# Patient Record
Sex: MALE | Race: WHITE | NOT HISPANIC OR LATINO | Employment: OTHER | URBAN - METROPOLITAN AREA
[De-identification: names, ages, dates, MRNs, and addresses within clinical notes are randomized per-mention and may not be internally consistent; named-entity substitution may affect disease eponyms.]

---

## 2023-05-27 LAB
ALANINE AMINOTRANSFERASE (SGPT) (U/L) IN SER/PLAS: 14 U/L (ref 10–52)
ALBUMIN (G/DL) IN SER/PLAS: 4.3 G/DL (ref 3.4–5)
ALKALINE PHOSPHATASE (U/L) IN SER/PLAS: 55 U/L (ref 33–136)
AMPHETAMINE (PRESENCE) IN URINE BY SCREEN METHOD: ABNORMAL
ASPARTATE AMINOTRANSFERASE (SGOT) (U/L) IN SER/PLAS: 17 U/L (ref 9–39)
BARBITURATES PRESENCE IN URINE BY SCREEN METHOD: ABNORMAL
BASOPHILS (10*3/UL) IN BLOOD BY AUTOMATED COUNT: 0.08 X10E9/L (ref 0–0.1)
BASOPHILS/100 LEUKOCYTES IN BLOOD BY AUTOMATED COUNT: 1.3 % (ref 0–2)
BENZODIAZEPINE (PRESENCE) IN URINE BY SCREEN METHOD: ABNORMAL
BILIRUBIN DIRECT (MG/DL) IN SER/PLAS: 0.1 MG/DL (ref 0–0.3)
BILIRUBIN TOTAL (MG/DL) IN SER/PLAS: 0.5 MG/DL (ref 0–1.2)
CANNABINOIDS IN URINE BY SCREEN METHOD: ABNORMAL
COCAINE (PRESENCE) IN URINE BY SCREEN METHOD: ABNORMAL
DRUG SCREEN COMMENT URINE: ABNORMAL
EOSINOPHILS (10*3/UL) IN BLOOD BY AUTOMATED COUNT: 0.43 X10E9/L (ref 0–0.4)
EOSINOPHILS/100 LEUKOCYTES IN BLOOD BY AUTOMATED COUNT: 6.8 % (ref 0–6)
ERYTHROCYTE DISTRIBUTION WIDTH (RATIO) BY AUTOMATED COUNT: 13.2 % (ref 11.5–14.5)
ERYTHROCYTE MEAN CORPUSCULAR HEMOGLOBIN CONCENTRATION (G/DL) BY AUTOMATED: 32.1 G/DL (ref 32–36)
ERYTHROCYTE MEAN CORPUSCULAR VOLUME (FL) BY AUTOMATED COUNT: 104 FL (ref 80–100)
ERYTHROCYTES (10*6/UL) IN BLOOD BY AUTOMATED COUNT: 3.94 X10E12/L (ref 4.5–5.9)
FENTANYL URINE: ABNORMAL
HEMATOCRIT (%) IN BLOOD BY AUTOMATED COUNT: 40.8 % (ref 41–52)
HEMOGLOBIN (G/DL) IN BLOOD: 13.1 G/DL (ref 13.5–17.5)
IMMATURE GRANULOCYTES/100 LEUKOCYTES IN BLOOD BY AUTOMATED COUNT: 0.3 % (ref 0–0.9)
LEUKOCYTES (10*3/UL) IN BLOOD BY AUTOMATED COUNT: 6.3 X10E9/L (ref 4.4–11.3)
LYMPHOCYTES (10*3/UL) IN BLOOD BY AUTOMATED COUNT: 1.75 X10E9/L (ref 0.8–3)
LYMPHOCYTES/100 LEUKOCYTES IN BLOOD BY AUTOMATED COUNT: 27.7 % (ref 13–44)
METHADONE (PRESENCE) IN URINE BY SCREEN METHOD: ABNORMAL
MONOCYTES (10*3/UL) IN BLOOD BY AUTOMATED COUNT: 0.52 X10E9/L (ref 0.05–0.8)
MONOCYTES/100 LEUKOCYTES IN BLOOD BY AUTOMATED COUNT: 8.2 % (ref 2–10)
NEUTROPHILS (10*3/UL) IN BLOOD BY AUTOMATED COUNT: 3.51 X10E9/L (ref 1.6–5.5)
NEUTROPHILS/100 LEUKOCYTES IN BLOOD BY AUTOMATED COUNT: 55.7 % (ref 40–80)
NRBC (PER 100 WBCS) BY AUTOMATED COUNT: 0 /100 WBC (ref 0–0)
OPIATES (PRESENCE) IN URINE BY SCREEN METHOD: ABNORMAL
OXYCODONE (PRESENCE) IN URINE BY SCREEN METHOD: ABNORMAL
PHENCYCLIDINE (PRESENCE) IN URINE BY SCREEN METHOD: ABNORMAL
PHENOBARBITAL (UG/ML) IN SER/PLAS: 16.6 UG/ML (ref 10–40)
PHENYTOIN (UG/ML) IN SER/PLAS: 6.4 UG/ML (ref 10–20)
PLATELETS (10*3/UL) IN BLOOD AUTOMATED COUNT: 283 X10E9/L (ref 150–450)
PROTEIN TOTAL: 7 G/DL (ref 6.4–8.2)

## 2023-06-01 LAB — OXCARB OR ESLICARB METABOLITE (MHD): 14 UG/ML (ref 3–35)

## 2023-06-03 LAB
BUTALBITAL, URN, QUANT: <50 NG/ML
PENTOBARBITAL, URN, QUANT: <50 NG/ML
PHENOBARBITAL, URN, QUANT: >5000 NG/ML

## 2023-08-08 LAB
ANION GAP IN SER/PLAS: 14 MMOL/L (ref 10–20)
CALCIUM (MG/DL) IN SER/PLAS: 9.8 MG/DL (ref 8.6–10.6)
CARBON DIOXIDE, TOTAL (MMOL/L) IN SER/PLAS: 28 MMOL/L (ref 21–32)
CHLORIDE (MMOL/L) IN SER/PLAS: 103 MMOL/L (ref 98–107)
CREATININE (MG/DL) IN SER/PLAS: 0.87 MG/DL (ref 0.5–1.3)
ERYTHROCYTE DISTRIBUTION WIDTH (RATIO) BY AUTOMATED COUNT: 13 % (ref 11.5–14.5)
ERYTHROCYTE MEAN CORPUSCULAR HEMOGLOBIN CONCENTRATION (G/DL) BY AUTOMATED: 31.4 G/DL (ref 32–36)
ERYTHROCYTE MEAN CORPUSCULAR VOLUME (FL) BY AUTOMATED COUNT: 104 FL (ref 80–100)
ERYTHROCYTES (10*6/UL) IN BLOOD BY AUTOMATED COUNT: 4.03 X10E12/L (ref 4.5–5.9)
GFR MALE: 90 ML/MIN/1.73M2
GLUCOSE (MG/DL) IN SER/PLAS: 88 MG/DL (ref 74–99)
HEMATOCRIT (%) IN BLOOD BY AUTOMATED COUNT: 42 % (ref 41–52)
HEMOGLOBIN (G/DL) IN BLOOD: 13.2 G/DL (ref 13.5–17.5)
LEUKOCYTES (10*3/UL) IN BLOOD BY AUTOMATED COUNT: 6.5 X10E9/L (ref 4.4–11.3)
NRBC (PER 100 WBCS) BY AUTOMATED COUNT: 0 /100 WBC (ref 0–0)
PLATELETS (10*3/UL) IN BLOOD AUTOMATED COUNT: 288 X10E9/L (ref 150–450)
POTASSIUM (MMOL/L) IN SER/PLAS: 5.5 MMOL/L (ref 3.5–5.3)
SODIUM (MMOL/L) IN SER/PLAS: 139 MMOL/L (ref 136–145)
UREA NITROGEN (MG/DL) IN SER/PLAS: 19 MG/DL (ref 6–23)

## 2023-10-30 DIAGNOSIS — R51.9 FACIAL PAIN: Primary | ICD-10-CM

## 2023-10-30 RX ORDER — OXCARBAZEPINE 300 MG/1
300 TABLET, FILM COATED ORAL 2 TIMES DAILY
Qty: 30 TABLET | Refills: 2 | Status: SHIPPED | OUTPATIENT
Start: 2023-10-30 | End: 2023-11-20 | Stop reason: WASHOUT

## 2023-11-17 DIAGNOSIS — R51.9 FACIAL PAIN: Primary | ICD-10-CM

## 2023-11-18 DIAGNOSIS — G40.409 OTHER GENERALIZED EPILEPSY AND EPILEPTIC SYNDROMES, NOT INTRACTABLE, WITHOUT STATUS EPILEPTICUS (MULTI): Primary | ICD-10-CM

## 2023-11-20 RX ORDER — OXCARBAZEPINE 300 MG/1
300 TABLET, FILM COATED ORAL 2 TIMES DAILY
Qty: 60 TABLET | Refills: 2 | Status: SHIPPED | OUTPATIENT
Start: 2023-11-20 | End: 2023-12-13 | Stop reason: SDUPTHER

## 2023-11-20 RX ORDER — OXCARBAZEPINE 600 MG/1
600 TABLET, FILM COATED ORAL 2 TIMES DAILY
Qty: 180 TABLET | Refills: 0 | OUTPATIENT
Start: 2023-11-20

## 2023-11-20 RX ORDER — PHENOBARBITAL 64.8 MG/1
64.8 TABLET ORAL 2 TIMES DAILY
Qty: 180 TABLET | Refills: 0 | Status: SHIPPED | OUTPATIENT
Start: 2023-11-20

## 2023-11-27 ENCOUNTER — OFFICE VISIT (OUTPATIENT)
Dept: NEUROLOGY | Facility: CLINIC | Age: 73
End: 2023-11-27
Payer: MEDICARE

## 2023-11-27 DIAGNOSIS — R51.9 FACIAL PAIN: Primary | ICD-10-CM

## 2023-11-27 DIAGNOSIS — Z87.898 HISTORY OF SEIZURE: ICD-10-CM

## 2023-11-27 PROCEDURE — 1160F RVW MEDS BY RX/DR IN RCRD: CPT | Performed by: PSYCHIATRY & NEUROLOGY

## 2023-11-27 PROCEDURE — 99213 OFFICE O/P EST LOW 20 MIN: CPT | Performed by: PSYCHIATRY & NEUROLOGY

## 2023-11-27 PROCEDURE — 1159F MED LIST DOCD IN RCRD: CPT | Performed by: PSYCHIATRY & NEUROLOGY

## 2023-11-27 RX ORDER — NAPROXEN SODIUM 220 MG/1
81 TABLET, FILM COATED ORAL DAILY
COMMUNITY
Start: 2022-06-21

## 2023-11-27 RX ORDER — GABAPENTIN 300 MG/1
300 CAPSULE ORAL 2 TIMES DAILY
COMMUNITY
Start: 2021-04-12

## 2023-11-27 RX ORDER — FUROSEMIDE 40 MG/1
40 TABLET ORAL
COMMUNITY
Start: 2017-09-18

## 2023-11-27 RX ORDER — LISINOPRIL 2.5 MG/1
2.5 TABLET ORAL DAILY
COMMUNITY
Start: 2023-05-08

## 2023-11-27 RX ORDER — PHENYTOIN SODIUM 200 MG/1
200 CAPSULE, EXTENDED RELEASE ORAL 2 TIMES DAILY
COMMUNITY
End: 2024-02-20 | Stop reason: SDUPTHER

## 2023-11-27 RX ORDER — ATORVASTATIN CALCIUM 80 MG/1
80 TABLET, FILM COATED ORAL DAILY
COMMUNITY

## 2023-11-27 RX ORDER — SOTALOL HYDROCHLORIDE 80 MG/1
40 TABLET ORAL
COMMUNITY
Start: 2022-01-11 | End: 2024-10-27

## 2023-11-27 NOTE — PROGRESS NOTES
Chief Complaint: Facial Pain    HPI  73 y.o. male presenting for follow up regarding facial pain    Since the last visit, he dropped his Trileptal dose to 150 mg BID.  He noted pain in the right V1 region.  He increased his Trileptal dose on his own to 450 mg BID.  At this dose, he is pain free.  He notes an improvement in the itching.  He did not see Dermatology.    He denies any seizures.  He is on Phenobarbital 64.8 mg BID and Dilantin 200 mg BID.  His last seizure was 33 years ago.          Current Outpatient Medications:     OXcarbazepine (Trileptal) 300 mg tablet, Take 1 tablet (300 mg) by mouth 2 times a day., Disp: 60 tablet, Rfl: 2    PHENobarbitaL 64.8 mg tablet, TAKE ONE TABLET BY MOUTH TWO TIMES A DAY, Disp: 180 tablet, Rfl: 0    phenytoin ER (Dilantin) 200 mg capsule, Take 1 capsule (200 mg) by mouth 2 times a day., Disp: , Rfl:      Objective:  Gen: NAD  Neuro:  --HIF: A&O X 3, repetition and naming intact  --CN:  PERRLA, EOMI, VFF, no visible facial asymmetry, facial sensation intact, no tongue or palatal deviation, SCM intact  --Motor: Moves all 4 extremities equally; no focal deficits  --Sensory: Intact to light touch, intact to pinprick  --Reflex: trace symmetric, toes down  --Cerebellum: FTN and HTS intact  --Gait: Antalgic gait    Relevant Results      Assessment:    Facial Pain  - likely TN  - he tried to reduce his Trileptal dose but noted recurrence of facial pain  - recommend continuing Trileptal 450 mg BID    2.  History of Seizures - seizure free for over 30 years  - continue Dilantin and Phenobarbital    Follow up in 4 months,      Eddie Morin MD  [facility]  Department of Neurology

## 2023-12-13 ENCOUNTER — TELEPHONE (OUTPATIENT)
Dept: NEUROLOGY | Facility: CLINIC | Age: 73
End: 2023-12-13
Payer: MEDICARE

## 2023-12-13 DIAGNOSIS — R51.9 FACIAL PAIN: ICD-10-CM

## 2023-12-13 RX ORDER — OXCARBAZEPINE 600 MG/1
600 TABLET, FILM COATED ORAL 2 TIMES DAILY
Qty: 60 TABLET | Refills: 5 | Status: SHIPPED | OUTPATIENT
Start: 2023-12-13

## 2023-12-13 NOTE — TELEPHONE ENCOUNTER
Jose Luis called to tell you that the 300mg of the oxcarbazepine is not working. He wanted to know if he could switch back to the original prescription. 600mg 1 tablet twice daily. He also wanted to know if you could send it in with as many pills and refills as possible.    I also confirmed the [facility] in [place] is his preferred pharmacy.    Please advise,  Thanks!

## 2024-01-30 RX ORDER — GABAPENTIN 300 MG/1
300 CAPSULE ORAL 2 TIMES DAILY
Status: CANCELLED | OUTPATIENT
Start: 2024-01-30

## 2024-02-20 DIAGNOSIS — G40.409 OTHER GENERALIZED EPILEPSY AND EPILEPTIC SYNDROMES, NOT INTRACTABLE, WITHOUT STATUS EPILEPTICUS (MULTI): ICD-10-CM

## 2024-02-20 RX ORDER — PHENYTOIN SODIUM 200 MG/1
200 CAPSULE, EXTENDED RELEASE ORAL 2 TIMES DAILY
Qty: 60 CAPSULE | Refills: 5 | Status: SHIPPED | OUTPATIENT
Start: 2024-02-20 | End: 2024-05-24 | Stop reason: SDUPTHER

## 2024-03-27 ENCOUNTER — APPOINTMENT (OUTPATIENT)
Dept: NEUROLOGY | Facility: CLINIC | Age: 74
End: 2024-03-27
Payer: MEDICARE

## 2024-05-24 ENCOUNTER — TELEPHONE (OUTPATIENT)
Dept: NEUROLOGY | Facility: CLINIC | Age: 74
End: 2024-05-24
Payer: MEDICARE

## 2024-05-24 DIAGNOSIS — G40.409 OTHER GENERALIZED EPILEPSY AND EPILEPTIC SYNDROMES, NOT INTRACTABLE, WITHOUT STATUS EPILEPTICUS (MULTI): ICD-10-CM

## 2024-05-24 RX ORDER — PHENYTOIN SODIUM 100 MG/1
200 CAPSULE, EXTENDED RELEASE ORAL 2 TIMES DAILY
Qty: 120 CAPSULE | Refills: 2 | Status: SHIPPED | OUTPATIENT
Start: 2024-05-24

## 2024-05-24 RX ORDER — PHENYTOIN SODIUM 200 MG/1
200 CAPSULE, EXTENDED RELEASE ORAL 2 TIMES DAILY
Qty: 60 CAPSULE | Refills: 5 | Status: SHIPPED | OUTPATIENT
Start: 2024-05-24 | End: 2024-05-24 | Stop reason: SDUPTHER

## 2024-05-24 NOTE — TELEPHONE ENCOUNTER
Pts wife Darian called requesting a new prescription for Phenytoin ER 200mg sent to [facility] on [address]. Please review, thanks.

## 2024-05-24 NOTE — TELEPHONE ENCOUNTER
Jose Luis called and requested us to change his medication due to the pharmacy not having his specific medication on hand. He is requesting Phenytoin 100mg capsules, BID. Please review, thanks.

## 2024-07-15 ENCOUNTER — TELEPHONE (OUTPATIENT)
Dept: NEUROLOGY | Facility: CLINIC | Age: 74
End: 2024-07-15
Payer: COMMERCIAL

## 2024-07-15 DIAGNOSIS — G40.409 OTHER GENERALIZED EPILEPSY AND EPILEPTIC SYNDROMES, NOT INTRACTABLE, WITHOUT STATUS EPILEPTICUS (MULTI): ICD-10-CM

## 2024-07-15 RX ORDER — PHENOBARBITAL 64.8 MG/1
64.8 TABLET ORAL 2 TIMES DAILY
Qty: 180 TABLET | Refills: 0 | Status: SHIPPED | OUTPATIENT
Start: 2024-07-15

## 2024-07-15 NOTE — TELEPHONE ENCOUNTER
Pt needs a refill on the phenobarbital 64.8mg sent to the [facility] [place] on [address],  also he would like his MRI read that had been done when he had his surgery done in March

## 2024-07-25 ENCOUNTER — LAB (OUTPATIENT)
Dept: LAB | Facility: LAB | Age: 74
End: 2024-07-25
Payer: COMMERCIAL

## 2024-07-25 ENCOUNTER — APPOINTMENT (OUTPATIENT)
Dept: NEUROLOGY | Facility: CLINIC | Age: 74
End: 2024-07-25
Payer: COMMERCIAL

## 2024-07-25 VITALS
BODY MASS INDEX: 41.09 KG/M2 | WEIGHT: 303.4 LBS | DIASTOLIC BLOOD PRESSURE: 86 MMHG | SYSTOLIC BLOOD PRESSURE: 144 MMHG | TEMPERATURE: 96.9 F | HEIGHT: 72 IN | HEART RATE: 73 BPM

## 2024-07-25 DIAGNOSIS — R26.9 GAIT DISORDER: ICD-10-CM

## 2024-07-25 DIAGNOSIS — R63.4 ABNORMAL WEIGHT LOSS: ICD-10-CM

## 2024-07-25 DIAGNOSIS — R41.89 COGNITIVE DECLINE: Primary | ICD-10-CM

## 2024-07-25 DIAGNOSIS — R41.89 COGNITIVE DECLINE: ICD-10-CM

## 2024-07-25 DIAGNOSIS — G40.909 SEIZURE DISORDER (MULTI): ICD-10-CM

## 2024-07-25 DIAGNOSIS — R51.9 FACIAL PAIN: ICD-10-CM

## 2024-07-25 LAB
ALBUMIN SERPL BCP-MCNC: 4.4 G/DL (ref 3.4–5)
ALP SERPL-CCNC: 62 U/L (ref 33–136)
ALT SERPL W P-5'-P-CCNC: 11 U/L (ref 10–52)
ANION GAP SERPL CALC-SCNC: 15 MMOL/L (ref 10–20)
AST SERPL W P-5'-P-CCNC: 18 U/L (ref 9–39)
BASOPHILS # BLD AUTO: 0.08 X10*3/UL (ref 0–0.1)
BASOPHILS NFR BLD AUTO: 1 %
BILIRUB SERPL-MCNC: 0.6 MG/DL (ref 0–1.2)
BUN SERPL-MCNC: 19 MG/DL (ref 6–23)
CALCIUM SERPL-MCNC: 9.9 MG/DL (ref 8.6–10.3)
CHLORIDE SERPL-SCNC: 102 MMOL/L (ref 98–107)
CO2 SERPL-SCNC: 26 MMOL/L (ref 21–32)
CREAT SERPL-MCNC: 0.93 MG/DL (ref 0.5–1.3)
EGFRCR SERPLBLD CKD-EPI 2021: 86 ML/MIN/1.73M*2
EOSINOPHIL # BLD AUTO: 0.4 X10*3/UL (ref 0–0.4)
EOSINOPHIL NFR BLD AUTO: 5.2 %
ERYTHROCYTE [DISTWIDTH] IN BLOOD BY AUTOMATED COUNT: 14.8 % (ref 11.5–14.5)
GLUCOSE SERPL-MCNC: 83 MG/DL (ref 74–99)
HCT VFR BLD AUTO: 40 % (ref 41–52)
HGB BLD-MCNC: 12.9 G/DL (ref 13.5–17.5)
IMM GRANULOCYTES # BLD AUTO: 0.02 X10*3/UL (ref 0–0.5)
IMM GRANULOCYTES NFR BLD AUTO: 0.3 % (ref 0–0.9)
LYMPHOCYTES # BLD AUTO: 1.34 X10*3/UL (ref 0.8–3)
LYMPHOCYTES NFR BLD AUTO: 17.6 %
MCH RBC QN AUTO: 33.2 PG (ref 26–34)
MCHC RBC AUTO-ENTMCNC: 32.3 G/DL (ref 32–36)
MCV RBC AUTO: 103 FL (ref 80–100)
MONOCYTES # BLD AUTO: 0.67 X10*3/UL (ref 0.05–0.8)
MONOCYTES NFR BLD AUTO: 8.8 %
NEUTROPHILS # BLD AUTO: 5.11 X10*3/UL (ref 1.6–5.5)
NEUTROPHILS NFR BLD AUTO: 67.1 %
NRBC BLD-RTO: 0 /100 WBCS (ref 0–0)
PHENOBARB SERPL-MCNC: 22.7 UG/ML (ref 10–40)
PHENYTOIN SERPL-MCNC: 14 UG/ML (ref 10–20)
PLATELET # BLD AUTO: 291 X10*3/UL (ref 150–450)
POTASSIUM SERPL-SCNC: 4.6 MMOL/L (ref 3.5–5.3)
PROT SERPL-MCNC: 7.3 G/DL (ref 6.4–8.2)
RBC # BLD AUTO: 3.88 X10*6/UL (ref 4.5–5.9)
SODIUM SERPL-SCNC: 138 MMOL/L (ref 136–145)
TSH SERPL-ACNC: 1.26 MIU/L (ref 0.44–3.98)
VIT B12 SERPL-MCNC: 226 PG/ML (ref 211–911)
WBC # BLD AUTO: 7.6 X10*3/UL (ref 4.4–11.3)

## 2024-07-25 PROCEDURE — 85025 COMPLETE CBC W/AUTO DIFF WBC: CPT

## 2024-07-25 PROCEDURE — 1036F TOBACCO NON-USER: CPT | Performed by: PSYCHIATRY & NEUROLOGY

## 2024-07-25 PROCEDURE — 99215 OFFICE O/P EST HI 40 MIN: CPT | Performed by: PSYCHIATRY & NEUROLOGY

## 2024-07-25 PROCEDURE — 80053 COMPREHEN METABOLIC PANEL: CPT

## 2024-07-25 PROCEDURE — 1159F MED LIST DOCD IN RCRD: CPT | Performed by: PSYCHIATRY & NEUROLOGY

## 2024-07-25 PROCEDURE — 84443 ASSAY THYROID STIM HORMONE: CPT

## 2024-07-25 PROCEDURE — 80185 ASSAY OF PHENYTOIN TOTAL: CPT

## 2024-07-25 PROCEDURE — 80186 ASSAY OF PHENYTOIN FREE: CPT

## 2024-07-25 PROCEDURE — 36415 COLL VENOUS BLD VENIPUNCTURE: CPT

## 2024-07-25 PROCEDURE — 80183 DRUG SCRN QUANT OXCARBAZEPIN: CPT

## 2024-07-25 PROCEDURE — 80184 ASSAY OF PHENOBARBITAL: CPT

## 2024-07-25 PROCEDURE — 82607 VITAMIN B-12: CPT

## 2024-07-25 ASSESSMENT — ENCOUNTER SYMPTOMS
OCCASIONAL FEELINGS OF UNSTEADINESS: 1
LOSS OF SENSATION IN FEET: 0
DEPRESSION: 0

## 2024-07-25 ASSESSMENT — LIFESTYLE VARIABLES
SKIP TO QUESTIONS 9-10: 1
HOW MANY STANDARD DRINKS CONTAINING ALCOHOL DO YOU HAVE ON A TYPICAL DAY: PATIENT DOES NOT DRINK
HOW OFTEN DO YOU HAVE A DRINK CONTAINING ALCOHOL: NEVER
HOW OFTEN DO YOU HAVE SIX OR MORE DRINKS ON ONE OCCASION: NEVER
AUDIT-C TOTAL SCORE: 0

## 2024-07-25 ASSESSMENT — PATIENT HEALTH QUESTIONNAIRE - PHQ9
SUM OF ALL RESPONSES TO PHQ9 QUESTIONS 1 & 2: 0
1. LITTLE INTEREST OR PLEASURE IN DOING THINGS: NOT AT ALL
2. FEELING DOWN, DEPRESSED OR HOPELESS: NOT AT ALL

## 2024-07-25 NOTE — PROGRESS NOTES
Chief Complaint: Cognitive Difficulty, Facial Pain, Balance difficulty    HPI  74 y.o. male presenting for follow up regarding above.    Since the last visit, the patient has developed new symptoms.    He has developed short-term memory difficulty ever since March 2024.  He frequently forgets recent conversations and recent events.  He does repeat himself.  His family has noticed that he becomes confused easily.  He forgets how to turn off the television.  He gets his medications confused all the time.  He confuses doctors.  The other day, he turned on the heat and set the thermastat to 89 degrees (even though it was the middle of the summer).  He is forgetting to pay bills.  He is more aphatetic now and not interested in things.  He sleeps all the time.      His balance is off as well.  He has fallen and uses a walker.  His family feels like he shuffles his feet.  He denies any lower extremity weakness/numbness.  He also denies any low back pain or neck pain.  He denies any bladder incontinence.  He does occasionally have tremors in his hands.  The tremors are mostly in his left hand.  It occurs both at rest and when using his hands.  He is much slower.  It is difficult to get up from a chair, button a shirt.  He denies any difficulty with swallowing or drooling.  He denies any micrographia.  His family has noticed that his voice is getting softer.     He has noted worsening facial pain.  He describes a throbbing pain in the right V1 region.  He denies any electrical shock like sensation.  The pain does not worsen with chewing, speaking, or brushing his teeth.  He later on tells me that talking can cause an electrical shock in this region.  He is currently on Oxcarbazepine 600 mg BID.      He has had a 30 lb weight loss since April.      Of note, he had a hip replacement in March.  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      The patient denies any specific seizures since the last visit.   Specifically, there was no evidence of loss of consciousness, convulsions, myoclonus, staring spells, mariia vu, jammais vu, olfactory aura, gustatory aura, rising epigastric aura, or aphasia.    Current AEDs:   Oxcarbazepine 600 mg BID  Phenobarbital 64.8 mg BID  Phenytoin  mg BID      Current Outpatient Medications:     aspirin 81 mg chewable tablet, Chew 1 tablet (81 mg) once daily., Disp: , Rfl:     atorvastatin (Lipitor) 80 mg tablet, Take 1 tablet (80 mg) by mouth once daily., Disp: , Rfl:     furosemide (Lasix) 40 mg tablet, Take 1 tablet (40 mg) by mouth once daily., Disp: , Rfl:     gabapentin (Neurontin) 300 mg capsule, Take 1 capsule (300 mg) by mouth 2 times a day., Disp: , Rfl:     lisinopril 2.5 mg tablet, Take 1 tablet (2.5 mg) by mouth once daily., Disp: , Rfl:     OXcarbazepine (Trileptal) 600 mg tablet, Take 1 tablet (600 mg) by mouth 2 times a day., Disp: 60 tablet, Rfl: 5    PHENobarbitaL 64.8 mg tablet, Take 1 tablet (64.8 mg) by mouth 2 times a day., Disp: 180 tablet, Rfl: 0    phenytoin ER (Dilantin) 100 mg capsule, Take 2 capsules (200 mg) by mouth 2 times a day., Disp: 120 capsule, Rfl: 2    rivaroxaban (Xarelto) 20 mg tablet, Take 1 tablet (20 mg) by mouth once daily in the evening. Take with meals., Disp: , Rfl:     sotalol (Betapace) 80 mg tablet, Take 0.5 tablets (40 mg) by mouth., Disp: , Rfl:       Objective:  /86 (BP Location: Left arm, Patient Position: Sitting, BP Cuff Size: Adult)   Pulse 73   Temp 36.1 °C (96.9 °F) (Temporal)   Ht 1.829 m (6')   Wt 138 kg (303 lb 6.4 oz)   BMI 41.15 kg/m²     Gen: NAD  Neuro:  --HIF:   Mini-Mental Status Exam:  Orientation to Time (Year, Season, Month, Date, Day of Week): 2  Orientation to Place (State, County, City, Name of Place, Floor):  5  Registration (Apple, Table, Tayler): 3  Attention (Spell 'World' backwards): 5  Delayed Verbal Recall: 3  Naming (Watch, Pen): 2  Repetition (No Ifs, Ands, or Buts): 1  Follows command with  crossover: 3  Read a sentence: 1  Write a sentence: 1  Copy a figure: 1  Total Score: 27    --CN:  PERRLA, EOMI, VFF, no visible facial asymmetry, facial sensation intact, no tongue or palatal deviation, SCM intact  --Motor: Moves all 4 extremities equally; no focal deficits; no significant bradykinesia, no tremors noted, no CWR  --Sensory: Intact to light touch, intact to pinprick  --Reflex: 2+ in UE, absent in LE; murillo's negative, toes down  --Cerebellum: FTN and HTS intact  --Gait: Shuffling Gait, uses walker, 5 steps with turns, stooped posture    Relevant Results  CT Head: no acute changes; ventricles and sulcal spaces are mildly enlarged    Assessment:    Cognitive Changes  - the patient has developed confusion and memory difficulty since March 2024 (following a hip surgery); he has had significant weight loss since the last visit  - on exam, her MMSE was 27/30  - ddx includes adverse effects of medication (or toxicity of medication), stroke, NPH, etc  - recommend MRI Brain with and without contrast  - labs: CBC, CMP, Vitamin B12, TSH, AED levels    2.  Gait Disorder  - ddx includes NPH, parkinsonism (family has noted tremors; not seen during exam)  - recommend MRI brain with and without contrast  - consider SHARI Scan    3.  Facial Pain  - possible trigeminal neuralgia  - will continue Oxcarbazepine 600 mg BID for now  - check level    4.  Seizure Disorder  - patient has a history of seizures; last seizure was decades ago  - patient is declining to wean off of any of his AED  - check AED levels    Eddie Morin MD  [facility]  Department of Neurology

## 2024-07-27 LAB — 10OH-CARBAZEPINE SERPL-MCNC: 19 UG/ML (ref 3–35)

## 2024-07-30 LAB
PHENYTOIN FREE SERPL-MCNC: 1.4 UG/ML (ref 1–2)
SCAN RESULT: NORMAL

## 2024-08-06 ENCOUNTER — TELEPHONE (OUTPATIENT)
Dept: NEUROLOGY | Facility: CLINIC | Age: 74
End: 2024-08-06
Payer: COMMERCIAL

## 2024-08-06 DIAGNOSIS — R51.9 FACIAL PAIN: ICD-10-CM

## 2024-08-06 RX ORDER — OXCARBAZEPINE 600 MG/1
600 TABLET, FILM COATED ORAL 2 TIMES DAILY
Qty: 60 TABLET | Refills: 5 | Status: SHIPPED | OUTPATIENT
Start: 2024-08-06

## 2024-08-07 DIAGNOSIS — R41.89 COGNITIVE CHANGES: Primary | ICD-10-CM

## 2024-08-12 ENCOUNTER — HOSPITAL ENCOUNTER (OUTPATIENT)
Dept: RADIOLOGY | Facility: HOSPITAL | Age: 74
Discharge: HOME | End: 2024-08-12
Payer: COMMERCIAL

## 2024-08-12 DIAGNOSIS — R41.89 COGNITIVE CHANGES: ICD-10-CM

## 2024-08-12 PROCEDURE — 71046 X-RAY EXAM CHEST 2 VIEWS: CPT | Performed by: RADIOLOGY

## 2024-08-12 PROCEDURE — 71046 X-RAY EXAM CHEST 2 VIEWS: CPT

## 2024-08-30 ENCOUNTER — HOSPITAL ENCOUNTER (OUTPATIENT)
Dept: CARDIOLOGY | Facility: HOSPITAL | Age: 74
Discharge: HOME | End: 2024-08-30
Payer: COMMERCIAL

## 2024-08-30 ENCOUNTER — HOSPITAL ENCOUNTER (OUTPATIENT)
Dept: RADIOLOGY | Facility: HOSPITAL | Age: 74
Discharge: HOME | End: 2024-08-30
Payer: COMMERCIAL

## 2024-08-30 VITALS — HEART RATE: 61 BPM | DIASTOLIC BLOOD PRESSURE: 89 MMHG | OXYGEN SATURATION: 100 % | SYSTOLIC BLOOD PRESSURE: 120 MMHG

## 2024-08-30 DIAGNOSIS — G40.909 EPILEPSY, UNSPECIFIED, NOT INTRACTABLE, WITHOUT STATUS EPILEPTICUS (MULTI): ICD-10-CM

## 2024-08-30 DIAGNOSIS — R51.9 FACIAL PAIN: ICD-10-CM

## 2024-08-30 DIAGNOSIS — R41.89 OTHER SYMPTOMS AND SIGNS INVOLVING COGNITIVE FUNCTIONS AND AWARENESS: ICD-10-CM

## 2024-08-30 DIAGNOSIS — R51.9 HEADACHE, UNSPECIFIED: ICD-10-CM

## 2024-08-30 DIAGNOSIS — R41.89 COGNITIVE DECLINE: ICD-10-CM

## 2024-08-30 DIAGNOSIS — R26.9 UNSPECIFIED ABNORMALITIES OF GAIT AND MOBILITY: ICD-10-CM

## 2024-08-30 DIAGNOSIS — R26.9 GAIT DISORDER: ICD-10-CM

## 2024-08-30 DIAGNOSIS — G40.909 SEIZURE DISORDER (MULTI): ICD-10-CM

## 2024-08-30 PROCEDURE — A9575 INJ GADOTERATE MEGLUMI 0.1ML: HCPCS | Performed by: PSYCHIATRY & NEUROLOGY

## 2024-08-30 PROCEDURE — 93286 PERI-PX EVAL PM/LDLS PM IP: CPT

## 2024-08-30 PROCEDURE — 2550000001 HC RX 255 CONTRASTS: Performed by: PSYCHIATRY & NEUROLOGY

## 2024-08-30 PROCEDURE — 70553 MRI BRAIN STEM W/O & W/DYE: CPT

## 2024-08-30 RX ORDER — GADOTERATE MEGLUMINE 376.9 MG/ML
20 INJECTION INTRAVENOUS
Status: COMPLETED | OUTPATIENT
Start: 2024-08-30 | End: 2024-08-30

## 2024-08-30 NOTE — NURSING NOTE
Patient completed MRI and the device clinical nurse checked patient's pacemaker and set it back to the routine that it was before MRI. TRINA

## 2024-08-30 NOTE — NURSING NOTE
Patient is alert and able to make needs known; has a pacemaker and the device clinical nurse checked patient's pacemaker, interrogated it and set it at DOO 80 beats per minute, per device nurse. Patient stated that he's ok. TRINA

## 2024-09-03 ENCOUNTER — TELEPHONE (OUTPATIENT)
Dept: NEUROLOGY | Facility: CLINIC | Age: 74
End: 2024-09-03
Payer: COMMERCIAL

## 2024-09-03 NOTE — TELEPHONE ENCOUNTER
I spoke to the patient's daughter in law.    Recommend he follow up with me.  Can you add him to my schedule, next Monday, 9/9 at 8:20 AM? Marisol is aware.

## 2024-09-09 ENCOUNTER — APPOINTMENT (OUTPATIENT)
Dept: NEUROLOGY | Facility: CLINIC | Age: 74
End: 2024-09-09
Payer: COMMERCIAL

## 2024-09-09 VITALS
WEIGHT: 307.2 LBS | DIASTOLIC BLOOD PRESSURE: 80 MMHG | HEART RATE: 70 BPM | HEIGHT: 72 IN | BODY MASS INDEX: 41.61 KG/M2 | TEMPERATURE: 96.6 F | SYSTOLIC BLOOD PRESSURE: 124 MMHG

## 2024-09-09 DIAGNOSIS — R26.9 GAIT DISORDER: Primary | ICD-10-CM

## 2024-09-09 DIAGNOSIS — R41.89 COGNITIVE DECLINE: ICD-10-CM

## 2024-09-09 DIAGNOSIS — R51.9 FACIAL PAIN: ICD-10-CM

## 2024-09-09 DIAGNOSIS — Z87.898 HISTORY OF SEIZURE: ICD-10-CM

## 2024-09-09 DIAGNOSIS — R25.1 TREMORS OF NERVOUS SYSTEM: ICD-10-CM

## 2024-09-09 PROCEDURE — 99215 OFFICE O/P EST HI 40 MIN: CPT | Performed by: PSYCHIATRY & NEUROLOGY

## 2024-09-09 PROCEDURE — 1159F MED LIST DOCD IN RCRD: CPT | Performed by: PSYCHIATRY & NEUROLOGY

## 2024-09-09 PROCEDURE — 1036F TOBACCO NON-USER: CPT | Performed by: PSYCHIATRY & NEUROLOGY

## 2024-09-09 RX ORDER — DONEPEZIL HYDROCHLORIDE 10 MG/1
TABLET, FILM COATED ORAL
Qty: 30 TABLET | Refills: 4 | Status: SHIPPED | OUTPATIENT
Start: 2024-09-09

## 2024-09-09 RX ORDER — AMIODARONE HYDROCHLORIDE 200 MG/1
1 TABLET ORAL
COMMUNITY
Start: 2024-08-22

## 2024-09-09 RX ORDER — METOPROLOL TARTRATE 100 MG/1
1 TABLET ORAL
COMMUNITY
Start: 2024-07-28

## 2024-09-09 ASSESSMENT — PATIENT HEALTH QUESTIONNAIRE - PHQ9
2. FEELING DOWN, DEPRESSED OR HOPELESS: NOT AT ALL
SUM OF ALL RESPONSES TO PHQ9 QUESTIONS 1 & 2: 0
1. LITTLE INTEREST OR PLEASURE IN DOING THINGS: NOT AT ALL

## 2024-09-09 ASSESSMENT — ENCOUNTER SYMPTOMS
OCCASIONAL FEELINGS OF UNSTEADINESS: 1
LOSS OF SENSATION IN FEET: 1
DEPRESSION: 0

## 2024-09-09 ASSESSMENT — LIFESTYLE VARIABLES
HOW OFTEN DO YOU HAVE SIX OR MORE DRINKS ON ONE OCCASION: NEVER
AUDIT-C TOTAL SCORE: 0
HOW MANY STANDARD DRINKS CONTAINING ALCOHOL DO YOU HAVE ON A TYPICAL DAY: PATIENT DOES NOT DRINK
HOW OFTEN DO YOU HAVE A DRINK CONTAINING ALCOHOL: NEVER
SKIP TO QUESTIONS 9-10: 1

## 2024-09-09 NOTE — PROGRESS NOTES
Chief Complaint: Memory Difficulty, Tremors, Facial Pain, Seizures    HPI  74 y.o. male presenting for follow up regarding above.    Since the last visit, he feels like he is largely the same.  His family notes that he did have one fall.  He is using a walker.  He does shuffle his feet. He does have tremors - particularly when he is using his hands (I.e. using the kieyboard).  His family notes it when he is at rest.  He is slower (iti is difficult to get up from a chair).  HE denies any difficulty with swallowing or drooling.  He does have hypophonia.    His family notes that he is forgetful.  During the day, he spends most of the time watching television or using the computer.  He does manage his medications (with the use of pill box).  Most of his bills are automatic.  He denies any hallucinations.      He continues to have a shooting pain in the right side of his eye or right nose.  It is triggered  by rubbing his nose.    He denies any seizures.          Current Outpatient Medications:     amiodarone (Pacerone) 200 mg tablet, Take 1 tablet (200 mg) by mouth early in the morning.., Disp: , Rfl:     aspirin 81 mg chewable tablet, Chew 1 tablet (81 mg) once daily., Disp: , Rfl:     atorvastatin (Lipitor) 80 mg tablet, Take 1 tablet (80 mg) by mouth once daily., Disp: , Rfl:     lisinopril 2.5 mg tablet, Take 1 tablet (2.5 mg) by mouth once daily., Disp: , Rfl:     metoprolol tartrate (Lopressor) 100 mg tablet, Take 1 tablet (100 mg) by mouth every 12 hours., Disp: , Rfl:     OXcarbazepine (Trileptal) 600 mg tablet, Take 1 tablet (600 mg) by mouth 2 times a day., Disp: 60 tablet, Rfl: 5    PHENobarbitaL 64.8 mg tablet, Take 1 tablet (64.8 mg) by mouth 2 times a day., Disp: 180 tablet, Rfl: 0    phenytoin ER (Dilantin) 100 mg capsule, Take 2 capsules (200 mg) by mouth 2 times a day., Disp: 120 capsule, Rfl: 2    rivaroxaban (Xarelto) 20 mg tablet, Take 1 tablet (20 mg) by mouth once daily in the evening. Take with  meals., Disp: , Rfl:       Objective:  /80 (BP Location: Right arm, Patient Position: Sitting, BP Cuff Size: Adult)   Pulse 70   Temp 35.9 °C (96.6 °F) (Temporal)   Ht 1.829 m (6')   Wt 139 kg (307 lb 3.2 oz)   BMI 41.66 kg/m²     Gen: NAD  Neuro:  --HIF: A&O X 3, repetition and naming intact  --CN:  PERRLA, EOMI, VFF, no visible facial asymmetry, facial sensation intact, no tongue or palatal deviation, SCM intact  --Motor: Moves all 4 extremities equally; no focal deficits; no bradykinesia or rigidity, slight right handt remor  --Sensory: Intact to light touch, intact to pinprick  --Reflex: 2+ symmetric, toes down  --Cerebellum: FTN and HTS intact  --Gait: Shuffling gait,     Relevant Results  Phenobarbital 22.7  Oxcarbazepeine: 19  Phenytoin: 1.4  Vitamin B12:  226  TSH 1.26    MRI Brain (I personally reviewed the images/tracings with the following interpretation)  Atrophy noted in the frontal-parietal region    Assessment:    Cognitive Decline  - the patient continues to have memory difficulty and confusion  - MRI Brain notable for atrophy in the frontal and parietal egion  - recommend starting Donepezi and titrate to 10 mg/day  - recommend starting oral vitamin B12 supplements 1000 mcg daily  - encouraged patient to participate in mentally stimulating activities (i.e. crossword puzzles, sudoku puzzles, etc)  - encouraged physical exercise (which has been shown to be beneficial for memory health)  - recommend mediterranean diet  - recommend holding off on driving    2.  Tremors, Gait Disorder  - no clear signs of parkinsonism (although he does have a shuffling gait)  - recommend holding off on L-Dopa  -     3.  Facial Pain  - continue Trileptal 600 mg BID    4.  History of Seizure  - no seizures since the last visit  - continue Phenobarbital 64.8 mg BID  -continue Dilantin 200 mg BID; consider weaning if off      Eddie Morin MD  [facility]  Department of Neurology

## 2024-09-12 ENCOUNTER — TELEPHONE (OUTPATIENT)
Dept: NEUROLOGY | Facility: CLINIC | Age: 74
End: 2024-09-12
Payer: COMMERCIAL

## 2024-09-12 NOTE — TELEPHONE ENCOUNTER
No, my plan is to have him call me in 4 weeks with an update.  If he is doing okay, I'll have him wean off of Dilantin.

## 2024-09-12 NOTE — TELEPHONE ENCOUNTER
[facility] called and wanted to let you know that he will have PT every 2 weeks for 4 weeks and then once a week for 2 weeks.     Also, there are multiple drug interactions between the aspirin and xarelto, metoprolol and amiodarone, xarelto and phenobarbital and lastly xarelto and dilantin. Would you like to make any changes? Please advise, thanks!

## 2024-10-10 ENCOUNTER — TELEPHONE (OUTPATIENT)
Dept: NEUROLOGY | Facility: CLINIC | Age: 74
End: 2024-10-10
Payer: COMMERCIAL

## 2024-10-10 NOTE — TELEPHONE ENCOUNTER
A home care Phy. Therapist from , [employer], would like to discuss adding one of their medical social workers to help him w/ community resources, transportation, mail order scripts or any other medical need he might need in the home. You said you can call her on a secure line, [phone] and leave a message.

## 2024-10-18 ENCOUNTER — PATIENT MESSAGE (OUTPATIENT)
Dept: NEUROLOGY | Facility: CLINIC | Age: 74
End: 2024-10-18
Payer: COMMERCIAL

## 2024-10-18 ENCOUNTER — TELEPHONE (OUTPATIENT)
Dept: NEUROLOGY | Facility: CLINIC | Age: 74
End: 2024-10-18
Payer: COMMERCIAL

## 2024-10-18 DIAGNOSIS — F32.A DEPRESSION, UNSPECIFIED DEPRESSION TYPE: Primary | ICD-10-CM

## 2024-10-18 NOTE — TELEPHONE ENCOUNTER
Delia with [facility] was wondering if it was possible for you to do a follow up visit in a couple of weeks to see how things are going with him and to gauge if there are any added services could be added by her.

## 2024-10-24 DIAGNOSIS — G40.409 OTHER GENERALIZED EPILEPSY AND EPILEPTIC SYNDROMES, NOT INTRACTABLE, WITHOUT STATUS EPILEPTICUS (MULTI): ICD-10-CM

## 2024-10-24 RX ORDER — PHENOBARBITAL 64.8 MG/1
64.8 TABLET ORAL 2 TIMES DAILY
Qty: 180 TABLET | Refills: 0 | Status: SHIPPED | OUTPATIENT
Start: 2024-10-24

## 2024-11-04 ENCOUNTER — TELEPHONE (OUTPATIENT)
Dept: NEUROLOGY | Facility: CLINIC | Age: 74
End: 2024-11-04
Payer: COMMERCIAL

## 2024-11-04 NOTE — TELEPHONE ENCOUNTER
Delia-the , wanted you to be aware that she did the initial eval for the patient and Jose Luis's wife declined need for follow up visit. Delia provided them with community resources and they will utilize those resources.

## 2024-11-19 ENCOUNTER — APPOINTMENT (OUTPATIENT)
Dept: NEUROLOGY | Facility: CLINIC | Age: 74
End: 2024-11-19
Payer: COMMERCIAL

## 2024-11-19 VITALS
TEMPERATURE: 96.6 F | BODY MASS INDEX: 40.57 KG/M2 | DIASTOLIC BLOOD PRESSURE: 90 MMHG | HEIGHT: 72 IN | SYSTOLIC BLOOD PRESSURE: 146 MMHG | WEIGHT: 299.5 LBS

## 2024-11-19 DIAGNOSIS — R25.1 TREMORS OF NERVOUS SYSTEM: ICD-10-CM

## 2024-11-19 DIAGNOSIS — Z87.898 HISTORY OF SEIZURE: ICD-10-CM

## 2024-11-19 DIAGNOSIS — R51.9 FACIAL PAIN: ICD-10-CM

## 2024-11-19 DIAGNOSIS — R26.9 GAIT DISORDER: ICD-10-CM

## 2024-11-19 DIAGNOSIS — R41.89 COGNITIVE DECLINE: Primary | ICD-10-CM

## 2024-11-19 PROCEDURE — 1036F TOBACCO NON-USER: CPT | Performed by: PSYCHIATRY & NEUROLOGY

## 2024-11-19 PROCEDURE — G2211 COMPLEX E/M VISIT ADD ON: HCPCS | Performed by: PSYCHIATRY & NEUROLOGY

## 2024-11-19 PROCEDURE — 99214 OFFICE O/P EST MOD 30 MIN: CPT | Performed by: PSYCHIATRY & NEUROLOGY

## 2024-11-19 PROCEDURE — 1159F MED LIST DOCD IN RCRD: CPT | Performed by: PSYCHIATRY & NEUROLOGY

## 2024-11-19 RX ORDER — DONEPEZIL HYDROCHLORIDE 10 MG/1
TABLET, FILM COATED ORAL
Qty: 30 TABLET | Refills: 5 | Status: SHIPPED | OUTPATIENT
Start: 2024-11-19

## 2024-11-19 ASSESSMENT — PATIENT HEALTH QUESTIONNAIRE - PHQ9
SUM OF ALL RESPONSES TO PHQ9 QUESTIONS 1 & 2: 1
1. LITTLE INTEREST OR PLEASURE IN DOING THINGS: SEVERAL DAYS
2. FEELING DOWN, DEPRESSED OR HOPELESS: NOT AT ALL

## 2024-11-19 ASSESSMENT — LIFESTYLE VARIABLES
SKIP TO QUESTIONS 9-10: 1
HOW OFTEN DO YOU HAVE A DRINK CONTAINING ALCOHOL: NEVER
HOW OFTEN DO YOU HAVE SIX OR MORE DRINKS ON ONE OCCASION: NEVER
AUDIT-C TOTAL SCORE: 0
HOW MANY STANDARD DRINKS CONTAINING ALCOHOL DO YOU HAVE ON A TYPICAL DAY: PATIENT DOES NOT DRINK

## 2024-11-19 NOTE — PROGRESS NOTES
Chief Complaint: Dementia, Seizures, Gait Disorder    HPI  74 y.o. male presenting for follow up regarding Dementia.    Since the last visit, the patient continues to have memory difficulty.  He forgets recent conversations and recent events.  He does tend to repeat himself.  He does occasionally go to a room and forgets why he went to that room.  During the day, he spends most of the time watching television and sitting in his recliner.  Currently, he is managing his medications.  He is insistent that he has not made any mistakes with his medications.      He continues to have difficulty with walking.  He uses a walker.  He denies any falls.  He denies nay neck pain or back pain.  He denies any bladder incontinence.      He denies any facial pain.    He denies any seizures.           Current Outpatient Medications:     aspirin 81 mg chewable tablet, Chew 1 tablet (81 mg) once daily., Disp: , Rfl:     atorvastatin (Lipitor) 80 mg tablet, Take 1 tablet (80 mg) by mouth once daily., Disp: , Rfl:     donepezil (Aricept) 10 mg tablet, Week 1 - 5 mg/day.  Week 2 and beyond - 10 mg/day, Disp: 30 tablet, Rfl: 4    lisinopril 2.5 mg tablet, Take 1 tablet (2.5 mg) by mouth once daily., Disp: , Rfl:     metoprolol tartrate (Lopressor) 100 mg tablet, Take 1 tablet (100 mg) by mouth every 12 hours., Disp: , Rfl:     OXcarbazepine (Trileptal) 600 mg tablet, Take 1 tablet (600 mg) by mouth 2 times a day., Disp: 60 tablet, Rfl: 5    PHENobarbitaL 64.8 mg tablet, TAKE ONE TABLET BY MOUTH TWO TIMES A DAY, Disp: 180 tablet, Rfl: 0    phenytoin ER (Dilantin) 100 mg capsule, Take 2 capsules (200 mg) by mouth 2 times a day., Disp: 120 capsule, Rfl: 2    rivaroxaban (Xarelto) 20 mg tablet, Take 1 tablet (20 mg) by mouth once daily in the evening. Take with meals., Disp: , Rfl:     amiodarone (Pacerone) 200 mg tablet, Take 1 tablet (200 mg) by mouth early in the morning.. (Patient not taking: Reported on 11/19/2024), Disp: , Rfl:        Objective:  Gen: NAD  Neuro:  --HIF:   Mini-Mental Status Exam:  Orientation to Time (Year, Season, Month, Date, Day of Week): 5  Orientation to Place (State, County, City, Name of Place, Floor):  5  Registration (Apple, Table, Tayler): 3  Attention (Spell 'World' backwards): 5  Delayed Verbal Recall: 3  Naming (Watch, Pen): 2  Repetition (No Ifs, Ands, or Buts): 1  Follows command with crossover: 3  Read a sentence: 1  Write a sentence: 1  Copy a figure: 1  Total Score: 30    --CN:  PERRLA, EOMI, VFF, no visible facial asymmetry, facial sensation intact, no tongue or palatal deviation, SCM intact  --Motor: Moves all 4 extremities equally; no focal deficits; end point tremor  --Sensory: Intact to light touch, intact to pinprick  --Reflex: 2+ in UE, absent in lower extremities  --Cerebellum: FTN and HTS intact  --Gait: Shuffling Gait    Relevant Results      Assessment:    Cognitive Decline  - the patient notes a modest improvement in memory  - on exam, his MMSE was 30/30  - recommend continuing Donepezil 10 mg/day  - continue oral B12 supplements  - recommend family help manage medications & bills/finances  - recommend driving evaluation    2.  Gait Disorder  - no evidence of NPH on imaging  - no myelopathic signs  - recommend continuing PT for balance training      3.  History of Seizure - remains seizure free  - continue Dilantin 200 mg BID for now  - continue Phenobarbital 64.8 mg BID    4.  Facial Pain - no flare ups  - continue Trileptal 600 mg BID      Eddie Morin MD  [facility]  Department of Neurology    I spent 30 minutes in the professional and overall care of this patient.

## 2025-02-03 ENCOUNTER — APPOINTMENT (OUTPATIENT)
Dept: NEUROLOGY | Facility: CLINIC | Age: 75
End: 2025-02-03
Payer: COMMERCIAL

## 2025-02-03 DIAGNOSIS — R51.9 FACIAL PAIN: ICD-10-CM

## 2025-02-03 RX ORDER — OXCARBAZEPINE 600 MG/1
600 TABLET, FILM COATED ORAL 2 TIMES DAILY
Qty: 60 TABLET | Refills: 5 | Status: SHIPPED | OUTPATIENT
Start: 2025-02-03 | End: 2025-02-12 | Stop reason: SDUPTHER

## 2025-02-11 ENCOUNTER — TELEPHONE (OUTPATIENT)
Dept: NEUROLOGY | Facility: CLINIC | Age: 75
End: 2025-02-11
Payer: MEDICARE

## 2025-02-11 DIAGNOSIS — R51.9 FACIAL PAIN: Primary | ICD-10-CM

## 2025-02-11 NOTE — TELEPHONE ENCOUNTER
Pt would like you to give him a call. For the past month, since he has been on the trileptal, he has been getting really bad headaches and was wondering if there was anything else he can try instead?

## 2025-02-12 RX ORDER — OXCARBAZEPINE 600 MG/1
900 TABLET, FILM COATED ORAL 2 TIMES DAILY
Qty: 90 TABLET | Refills: 5 | Status: SHIPPED | OUTPATIENT
Start: 2025-02-12

## 2025-02-12 NOTE — TELEPHONE ENCOUNTER
I spoke to the patient.  Over the last month, he has noted increasing facial pain.  He describes a sharp pain in his right forehead, behind his right eye, and pain down his jaw line - worsens with chewing.    Recommend increasing Trileptal to 900 mg BID  Check Trileptal level in one week.  Will also check BMP to monitor for hyponatremia.

## 2025-02-12 NOTE — TELEPHONE ENCOUNTER
Pt called again stating he has a really big problem and would like to talk to you. It is concerning the trileptal issue he called about yesterday.

## 2025-02-19 DIAGNOSIS — R51.9 FACIAL PAIN: ICD-10-CM

## 2025-02-21 ENCOUNTER — TELEPHONE (OUTPATIENT)
Dept: NEUROLOGY | Facility: CLINIC | Age: 75
End: 2025-02-21
Payer: MEDICARE

## 2025-02-21 DIAGNOSIS — R51.9 FACIAL PAIN: ICD-10-CM

## 2025-02-21 NOTE — TELEPHONE ENCOUNTER
Patient called in stating that he has been taking Trileptal for Trigeminal Neuralgia for 5-6 months and it has stopped working. He believes he will need to change to a different medication but would like to know what you advise.

## 2025-02-24 RX ORDER — OXCARBAZEPINE 600 MG/1
900 TABLET, FILM COATED ORAL 2 TIMES DAILY
Qty: 90 TABLET | Refills: 5 | Status: SHIPPED | OUTPATIENT
Start: 2025-02-24 | End: 2025-02-27 | Stop reason: SDUPTHER

## 2025-02-24 NOTE — TELEPHONE ENCOUNTER
Pt is calling in and states he needs to speak to you he wants a call ASAP because he states he left a VM Friday and still has not gotten a response. Can you please call the pt?    Thanks.

## 2025-02-24 NOTE — TELEPHONE ENCOUNTER
I spoke to the patient.  Recommend checking Trileptal level.  Based on level, will either increase his Trileptal level or add a Gabapentin.

## 2025-02-26 ENCOUNTER — TELEPHONE (OUTPATIENT)
Dept: NEUROLOGY | Facility: CLINIC | Age: 75
End: 2025-02-26
Payer: MEDICARE

## 2025-02-26 LAB
10OH-CARBAZEPINE SERPL-MCNC: 19.3 MCG/ML (ref 8–35)
ANION GAP SERPL CALCULATED.4IONS-SCNC: 11 MMOL/L (CALC) (ref 7–17)
BUN SERPL-MCNC: 21 MG/DL (ref 7–25)
BUN/CREAT SERPL: NORMAL (CALC) (ref 6–22)
CALCIUM SERPL-MCNC: 9.5 MG/DL (ref 8.6–10.3)
CHLORIDE SERPL-SCNC: 105 MMOL/L (ref 98–110)
CO2 SERPL-SCNC: 26 MMOL/L (ref 20–32)
CREAT SERPL-MCNC: 0.76 MG/DL (ref 0.7–1.28)
EGFRCR SERPLBLD CKD-EPI 2021: 94 ML/MIN/1.73M2
GLUCOSE SERPL-MCNC: 90 MG/DL (ref 65–139)
POTASSIUM SERPL-SCNC: 4.6 MMOL/L (ref 3.5–5.3)
SODIUM SERPL-SCNC: 142 MMOL/L (ref 135–146)

## 2025-02-26 NOTE — TELEPHONE ENCOUNTER
Pt said he got his blood work done and would like to know if you want to raise his medication or not. Please advise.

## 2025-02-27 DIAGNOSIS — R51.9 FACIAL PAIN: ICD-10-CM

## 2025-02-27 RX ORDER — OXCARBAZEPINE 600 MG/1
1200 TABLET, FILM COATED ORAL 2 TIMES DAILY
Start: 2025-02-27

## 2025-03-03 ENCOUNTER — TELEPHONE (OUTPATIENT)
Dept: NEUROLOGY | Facility: CLINIC | Age: 75
End: 2025-03-03
Payer: MEDICARE

## 2025-03-03 DIAGNOSIS — G40.409 OTHER GENERALIZED EPILEPSY AND EPILEPTIC SYNDROMES, NOT INTRACTABLE, WITHOUT STATUS EPILEPTICUS (MULTI): ICD-10-CM

## 2025-03-03 RX ORDER — PHENOBARBITAL 64.8 MG/1
64.8 TABLET ORAL 2 TIMES DAILY
Qty: 180 TABLET | Refills: 2 | Status: SHIPPED | OUTPATIENT
Start: 2025-03-03

## 2025-03-03 NOTE — TELEPHONE ENCOUNTER
Patient would like refill for Phenobarbital, [facility] pharmacy. He would like as many refills as he is allowed.

## 2025-03-07 ENCOUNTER — APPOINTMENT (OUTPATIENT)
Dept: NEUROLOGY | Facility: CLINIC | Age: 75
End: 2025-03-07
Payer: MEDICARE

## 2025-03-07 ENCOUNTER — TELEPHONE (OUTPATIENT)
Dept: NEUROLOGY | Facility: CLINIC | Age: 75
End: 2025-03-07

## 2025-03-07 DIAGNOSIS — R41.89 COGNITIVE DECLINE: Primary | ICD-10-CM

## 2025-03-07 DIAGNOSIS — R51.9 FACIAL PAIN: ICD-10-CM

## 2025-03-07 RX ORDER — OXCARBAZEPINE 600 MG/1
600 TABLET, FILM COATED ORAL 2 TIMES DAILY
Start: 2025-03-07

## 2025-03-07 RX ORDER — SERTRALINE HYDROCHLORIDE 50 MG/1
50 TABLET, FILM COATED ORAL DAILY
COMMUNITY

## 2025-03-07 RX ORDER — ATORVASTATIN CALCIUM 40 MG/1
40 TABLET, FILM COATED ORAL NIGHTLY
COMMUNITY
Start: 2025-01-29

## 2025-03-07 RX ORDER — DULOXETIN HYDROCHLORIDE 30 MG/1
30 CAPSULE, DELAYED RELEASE ORAL DAILY
Qty: 30 CAPSULE | Refills: 2 | Status: SHIPPED | OUTPATIENT
Start: 2025-03-07 | End: 2026-03-07

## 2025-03-07 RX ORDER — LISINOPRIL 5 MG/1
1 TABLET ORAL
COMMUNITY
Start: 2025-01-05

## 2025-03-07 ASSESSMENT — LIFESTYLE VARIABLES
HOW MANY STANDARD DRINKS CONTAINING ALCOHOL DO YOU HAVE ON A TYPICAL DAY: PATIENT DOES NOT DRINK
HOW OFTEN DO YOU HAVE SIX OR MORE DRINKS ON ONE OCCASION: NEVER
HOW OFTEN DO YOU HAVE A DRINK CONTAINING ALCOHOL: NEVER
SKIP TO QUESTIONS 9-10: 1
AUDIT-C TOTAL SCORE: 0

## 2025-03-07 ASSESSMENT — PATIENT HEALTH QUESTIONNAIRE - PHQ9
5. POOR APPETITE OR OVEREATING: NOT AT ALL
8. MOVING OR SPEAKING SO SLOWLY THAT OTHER PEOPLE COULD HAVE NOTICED. OR THE OPPOSITE, BEING SO FIGETY OR RESTLESS THAT YOU HAVE BEEN MOVING AROUND A LOT MORE THAN USUAL: NEARLY EVERY DAY
4. FEELING TIRED OR HAVING LITTLE ENERGY: NEARLY EVERY DAY
7. TROUBLE CONCENTRATING ON THINGS, SUCH AS READING THE NEWSPAPER OR WATCHING TELEVISION: NOT AT ALL
SUM OF ALL RESPONSES TO PHQ9 QUESTIONS 1 & 2: 3
6. FEELING BAD ABOUT YOURSELF - OR THAT YOU ARE A FAILURE OR HAVE LET YOURSELF OR YOUR FAMILY DOWN: NEARLY EVERY DAY
2. FEELING DOWN, DEPRESSED OR HOPELESS: NOT AT ALL
9. THOUGHTS THAT YOU WOULD BE BETTER OFF DEAD, OR OF HURTING YOURSELF: NOT AT ALL
1. LITTLE INTEREST OR PLEASURE IN DOING THINGS: NEARLY EVERY DAY
3. TROUBLE FALLING OR STAYING ASLEEP: NEARLY EVERY DAY
SUM OF ALL RESPONSES TO PHQ QUESTIONS 1-9: 15

## 2025-03-07 NOTE — PROGRESS NOTES
A telephone (audio only) between the patient (at the originating site) and the provider (at the distant site) was utilized to provide this telehealth service.    Verbal consent was requested and obtained from the patient on 3/7/2025    Chief Complaint: Facial Pain, seizures, Memory difficulty    HPI  74 y.o. male presenting for follow up regarding above.    Since the last visit, he continues to have severe facial pain.  He describes a sharp, stabbing pain in the right frontal region that goes down to the right side of his face.  The pain last anywhere 10-30 seconds.  The pain occurs several times per hour.  HE denies any tearing or redness of his right eye.    With the higher dose of Trileptal (1200 mg BID), he has noted several side effects including dizziness, double vision, and drowsiness.    His wife does note that he continues to have memory difficulty.  He frequently forgets recent conversations and recent events.  He does repeat himself.    He has not had any seizures since the last visit.          Current Outpatient Medications:     aspirin 81 mg chewable tablet, Chew 1 tablet (81 mg) once daily., Disp: , Rfl:     atorvastatin (Lipitor) 40 mg tablet, Take 1 tablet (40 mg) by mouth once daily at bedtime., Disp: , Rfl:     donepezil (Aricept) 10 mg tablet, Please take 10 mg/day, Disp: 30 tablet, Rfl: 5    lisinopril 5 mg tablet, Take 1 tablet (5 mg) by mouth early in the morning.., Disp: , Rfl:     metoprolol tartrate (Lopressor) 100 mg tablet, Take 1 tablet (100 mg) by mouth every 12 hours., Disp: , Rfl:     OXcarbazepine (Trileptal) 600 mg tablet, Take 2 tablets (1,200 mg) by mouth 2 times a day., Disp: , Rfl:     PHENobarbital (Luminal) 64.8 mg tablet, Take 1 tablet (64.8 mg) by mouth 2 times a day., Disp: 180 tablet, Rfl: 2    phenytoin ER (Dilantin) 100 mg capsule, Take 2 capsules (200 mg) by mouth 2 times a day., Disp: 120 capsule, Rfl: 2    sertraline (Zoloft) 50 mg tablet, Take 1 tablet (50 mg) by  mouth once daily., Disp: , Rfl:     amiodarone (Pacerone) 200 mg tablet, Take 1 tablet (200 mg) by mouth early in the morning.. (Patient not taking: Reported on 3/7/2025), Disp: , Rfl:     rivaroxaban (Xarelto) 20 mg tablet, Take 1 tablet (20 mg) by mouth once daily in the evening. Take with meals., Disp: , Rfl:       Objective:      Relevant Results      Assessment:    Facial Pain  - ddx includes SUNCT  - recommend reducing Trileptal back to 600 mg BID (as higher dose did not help)  - will try Cymbalta 30 mg/day (reviewed side effects)  - call in one week with an update; if no improvement, increaset o 60 mg/day    2.  Cognitive Decline  - overall stable  - continue Donepezil 10 mg/day  - driving evaluatoin    3.  History of Seizure - no seizures since the last visit  - continue Dilantin and Phenobarbital      Eddie Morin MD  [facility]  Department of Neurology

## 2025-03-07 NOTE — PROGRESS NOTES
Subjective   Jose Luis Dejesus is a 74 y.o.   male here for evaluation of memory problems. He is accompanied by spouse. Primary caregiver is wife. Patient lives with their spouse.     The family and the patient identify problems with changes in short term memory, recalling words, and repetition of questions.  Medication administration: patient self medicates.    Functional Assessments  Activities of Daily Living (ADLs)   He is independent in the following: ambulation, bathing and hygiene, feeding, continence, grooming, toileting, and dressing  Requires assistance with the following: none    Instrumental Activities of Daily Living (IADLs)  Patient is independent in the following: finances, reading, telephoning, and writing  Requires assistance with the following:housekeeping, shopping, and travel outside home

## 2025-03-07 NOTE — TELEPHONE ENCOUNTER
----- Message from Eddie Morin sent at 3/7/2025 11:42 AM EST -----  Bebe - can you give this patient information to schedule a occupational therapy driving evaluation?

## 2025-03-11 ENCOUNTER — TELEPHONE (OUTPATIENT)
Dept: NEUROLOGY | Facility: CLINIC | Age: 75
End: 2025-03-11
Payer: MEDICARE

## 2025-03-11 NOTE — TELEPHONE ENCOUNTER
Pt states that the duloxetine 30mg is not helping him at all and he is in constant pain. He would like you to call him to figure out what to do to help him.

## 2025-03-17 DIAGNOSIS — G40.409 OTHER GENERALIZED EPILEPSY AND EPILEPTIC SYNDROMES, NOT INTRACTABLE, WITHOUT STATUS EPILEPTICUS (MULTI): ICD-10-CM

## 2025-03-18 ENCOUNTER — TELEPHONE (OUTPATIENT)
Dept: NEUROLOGY | Facility: CLINIC | Age: 75
End: 2025-03-18
Payer: MEDICARE

## 2025-03-18 DIAGNOSIS — R51.9 FACIAL PAIN: ICD-10-CM

## 2025-03-18 RX ORDER — PHENYTOIN SODIUM 100 MG/1
200 CAPSULE, EXTENDED RELEASE ORAL 2 TIMES DAILY
Qty: 120 CAPSULE | Refills: 0 | Status: SHIPPED | OUTPATIENT
Start: 2025-03-18

## 2025-03-18 RX ORDER — DULOXETIN HYDROCHLORIDE 30 MG/1
60 CAPSULE, DELAYED RELEASE ORAL DAILY
Qty: 60 CAPSULE | Refills: 2 | Status: SHIPPED | OUTPATIENT
Start: 2025-03-18 | End: 2026-03-18

## 2025-03-18 NOTE — TELEPHONE ENCOUNTER
"Patient called in stating that he is out of his duloxetine and unable to fill it because they have a rx that reads \"one tablet daily\" and this was changed recently. Patient would like to know if you could send an updated rx for two tablets daily. Pharmacy on file  "

## 2025-03-21 ENCOUNTER — TELEPHONE (OUTPATIENT)
Dept: NEUROLOGY | Facility: CLINIC | Age: 75
End: 2025-03-21
Payer: MEDICARE

## 2025-03-21 NOTE — TELEPHONE ENCOUNTER
Pt would like to know if he should take one in the morning and two in the afternoon or morning, afternoon and night?

## 2025-03-21 NOTE — TELEPHONE ENCOUNTER
Pt called and said the last change in the Duloxitine  30mg (2 capsule daily) is not working and would like to know what else he can try? Do you want to increase or try a different one. His head is pounding. It woke him up at 5a this morning and hasn't stopped yet. Please advise.

## 2025-03-21 NOTE — TELEPHONE ENCOUNTER
Let's increase Duloxetine to 3 caps per day.  If no improvement by Wednesday of next week, I would try a different medication.

## 2025-03-26 ENCOUNTER — TELEPHONE (OUTPATIENT)
Dept: NEUROLOGY | Facility: CLINIC | Age: 75
End: 2025-03-26
Payer: MEDICARE

## 2025-03-26 DIAGNOSIS — R51.9 FACIAL PAIN: ICD-10-CM

## 2025-03-26 RX ORDER — DULOXETIN HYDROCHLORIDE 30 MG/1
CAPSULE, DELAYED RELEASE ORAL
Qty: 120 CAPSULE | Refills: 2 | Status: SHIPPED | OUTPATIENT
Start: 2025-03-26

## 2025-03-26 NOTE — TELEPHONE ENCOUNTER
I spoke to the patient.  He notes some improvement but wearing off of the medication after about 2-3 hours.  Will try increasing Cymbalta to 30 mg QID.

## 2025-03-26 NOTE — TELEPHONE ENCOUNTER
Pt is calling in and states that the dose was changed on the Duloxetine and its still not working for him he states that he's taking the pills tid and it only lasts for maybe about 4 hours then it starts back up again before he takes the next pill.      Please advise.

## 2025-03-31 ENCOUNTER — TELEPHONE (OUTPATIENT)
Dept: NEUROLOGY | Facility: CLINIC | Age: 75
End: 2025-03-31
Payer: MEDICARE

## 2025-03-31 DIAGNOSIS — R51.9 DAILY HEADACHE: Primary | ICD-10-CM

## 2025-03-31 NOTE — TELEPHONE ENCOUNTER
Let's have him wean off of Cymbalta with the following schedule:  Day 1-3:  30 mg BID  Day 4-6:  30 mg daily  Day 7: stop    We can try Topamax and titrate to 50 mg BID.  Main side effects including tingling in the hands and feet, weight loss, taste changes.  There is an increased risk of kidney stones and glaucoma - if he has these conditions, we should avoid this medication.    If he is agreeable, I can send the prescription.

## 2025-03-31 NOTE — TELEPHONE ENCOUNTER
Patient called in stating that his insurance will not cover 4 Cymbalta capsules a day. The pharmacy suggested sending the rx in for two 60 mg capsules instead. However, the patient stated that he is still having headaches and does not feel that the Cymbalta is helping. He would like to know what you advise.

## 2025-04-01 RX ORDER — TOPIRAMATE 50 MG/1
TABLET, FILM COATED ORAL
Qty: 60 TABLET | Refills: 5 | Status: SHIPPED | OUTPATIENT
Start: 2025-04-01

## 2025-04-04 ENCOUNTER — TELEPHONE (OUTPATIENT)
Dept: NEUROLOGY | Facility: CLINIC | Age: 75
End: 2025-04-04
Payer: MEDICARE

## 2025-04-04 NOTE — TELEPHONE ENCOUNTER
I spoke to the patient.  He notes worsening pain as he has been weaning off Cymbalta.    Recommend that he continue the Cymbalta 30 mg BID.    Recommend starting Topiaramate and titrate to 50 mg BID.

## 2025-04-11 ENCOUNTER — TELEPHONE (OUTPATIENT)
Dept: NEUROLOGY | Facility: CLINIC | Age: 75
End: 2025-04-11
Payer: MEDICARE

## 2025-04-11 NOTE — TELEPHONE ENCOUNTER
Pts wife called in wanting to make you aware that pt was on zoloft but he had been taking 200 mg daily instead of 100mg daily that was prescribed. She just got him back down to 100 mg yesterday. Wife believes that probably was causing headaches. Wife thinks that since he is having trouble keeping track of his own meds b/c of the dementia, she should be the one to dispense them. She asks that since pt will not listen to family, could you tell him that they should over-see meds either by phone or bringing him in to the office.

## 2025-04-14 DIAGNOSIS — G40.409 OTHER GENERALIZED EPILEPSY AND EPILEPTIC SYNDROMES, NOT INTRACTABLE, WITHOUT STATUS EPILEPTICUS (MULTI): ICD-10-CM

## 2025-04-14 RX ORDER — PHENYTOIN SODIUM 100 MG/1
200 CAPSULE, EXTENDED RELEASE ORAL 2 TIMES DAILY
Qty: 120 CAPSULE | Refills: 0 | Status: SHIPPED | OUTPATIENT
Start: 2025-04-14

## 2025-04-25 ENCOUNTER — TELEPHONE (OUTPATIENT)
Dept: NEUROLOGY | Facility: CLINIC | Age: 75
End: 2025-04-25
Payer: MEDICARE

## 2025-04-25 NOTE — TELEPHONE ENCOUNTER
Pt called in stating that the topiramate 50 mg twice a day but it is not helping at all.  He says when he takes the pill in the morning, feels pain all day. He would like to go back on the trileptal 600 mg(1200mg) twice a day but he would like to add a half a pill to each dose (1500 mg) twice a day. Please advise.

## 2025-04-29 NOTE — TELEPHONE ENCOUNTER
I spoke to the patient.  He feels no relief from Topiramate.  He re-started Trileptal 600 mg BID on his own.    Recommend increasing Trileptal to 900 mg BID for now  Continue Topiramate 50 mg BID for now

## 2025-05-17 ENCOUNTER — PATIENT MESSAGE (OUTPATIENT)
Dept: NEUROLOGY | Facility: CLINIC | Age: 75
End: 2025-05-17
Payer: MEDICARE

## 2025-05-17 DIAGNOSIS — R56.9 SEIZURE (MULTI): Primary | ICD-10-CM

## 2025-05-20 ENCOUNTER — TELEPHONE (OUTPATIENT)
Dept: NEUROLOGY | Facility: CLINIC | Age: 75
End: 2025-05-20

## 2025-05-20 ENCOUNTER — APPOINTMENT (OUTPATIENT)
Dept: CARDIOLOGY | Facility: HOSPITAL | Age: 75
DRG: 057 | End: 2025-05-20
Payer: MEDICARE

## 2025-05-20 ENCOUNTER — HOSPITAL ENCOUNTER (INPATIENT)
Facility: HOSPITAL | Age: 75
DRG: 057 | End: 2025-05-20
Attending: STUDENT IN AN ORGANIZED HEALTH CARE EDUCATION/TRAINING PROGRAM | Admitting: INTERNAL MEDICINE
Payer: MEDICARE

## 2025-05-20 ENCOUNTER — APPOINTMENT (OUTPATIENT)
Dept: RADIOLOGY | Facility: HOSPITAL | Age: 75
DRG: 057 | End: 2025-05-20
Payer: MEDICARE

## 2025-05-20 ENCOUNTER — APPOINTMENT (OUTPATIENT)
Dept: VASCULAR MEDICINE | Facility: HOSPITAL | Age: 75
DRG: 057 | End: 2025-05-20
Payer: MEDICARE

## 2025-05-20 DIAGNOSIS — T50.905A DRUG-INDUCED DIZZINESS: ICD-10-CM

## 2025-05-20 DIAGNOSIS — R42 DRUG-INDUCED DIZZINESS: ICD-10-CM

## 2025-05-20 DIAGNOSIS — R26.2 DIFFICULTY WALKING: ICD-10-CM

## 2025-05-20 DIAGNOSIS — Z01.89 ENCOUNTER FOR OTHER SPECIFIED SPECIAL EXAMINATIONS: ICD-10-CM

## 2025-05-20 DIAGNOSIS — R29.898 WEAKNESS OF BOTH LOWER EXTREMITIES: ICD-10-CM

## 2025-05-20 DIAGNOSIS — R29.6 MULTIPLE FALLS: ICD-10-CM

## 2025-05-20 DIAGNOSIS — R42 DIZZINESS: Primary | ICD-10-CM

## 2025-05-20 DIAGNOSIS — Z01.818 ENCOUNTER FOR OTHER PREPROCEDURAL EXAMINATION: ICD-10-CM

## 2025-05-20 PROBLEM — G31.83 LEWY BODY DEMENTIA (MULTI): Status: ACTIVE | Noted: 2025-05-20

## 2025-05-20 PROBLEM — R91.1 PULMONARY NODULE: Status: ACTIVE | Noted: 2025-05-20

## 2025-05-20 PROBLEM — F02.80 LEWY BODY DEMENTIA (MULTI): Status: ACTIVE | Noted: 2025-05-20

## 2025-05-20 PROBLEM — I10 HTN (HYPERTENSION): Status: ACTIVE | Noted: 2025-05-20

## 2025-05-20 LAB
ALBUMIN SERPL BCP-MCNC: 4 G/DL (ref 3.4–5)
ALP SERPL-CCNC: 51 U/L (ref 33–136)
ALT SERPL W P-5'-P-CCNC: 18 U/L (ref 10–52)
ANION GAP SERPL CALC-SCNC: 10 MMOL/L (ref 10–20)
APPEARANCE UR: CLEAR
AST SERPL W P-5'-P-CCNC: 21 U/L (ref 9–39)
BASOPHILS # BLD AUTO: 0.05 X10*3/UL (ref 0–0.1)
BASOPHILS NFR BLD AUTO: 1 %
BILIRUB SERPL-MCNC: 0.5 MG/DL (ref 0–1.2)
BILIRUB UR STRIP.AUTO-MCNC: NEGATIVE MG/DL
BUN SERPL-MCNC: 20 MG/DL (ref 6–23)
CALCIUM SERPL-MCNC: 9.1 MG/DL (ref 8.6–10.3)
CAOX CRY #/AREA UR COMP ASSIST: NORMAL /HPF
CARDIAC TROPONIN I PNL SERPL HS: 4 NG/L (ref 0–20)
CARDIAC TROPONIN I PNL SERPL HS: 4 NG/L (ref 0–20)
CHLORIDE SERPL-SCNC: 107 MMOL/L (ref 98–107)
CO2 SERPL-SCNC: 28 MMOL/L (ref 21–32)
COLOR UR: YELLOW
CREAT SERPL-MCNC: 0.85 MG/DL (ref 0.5–1.3)
EGFRCR SERPLBLD CKD-EPI 2021: 90 ML/MIN/1.73M*2
EOSINOPHIL # BLD AUTO: 0.35 X10*3/UL (ref 0–0.4)
EOSINOPHIL NFR BLD AUTO: 7.1 %
ERYTHROCYTE [DISTWIDTH] IN BLOOD BY AUTOMATED COUNT: 13.8 % (ref 11.5–14.5)
GLUCOSE SERPL-MCNC: 93 MG/DL (ref 74–99)
GLUCOSE UR STRIP.AUTO-MCNC: NORMAL MG/DL
HCT VFR BLD AUTO: 38.4 % (ref 41–52)
HGB BLD-MCNC: 12.5 G/DL (ref 13.5–17.5)
HOLD SPECIMEN: NORMAL
IMM GRANULOCYTES # BLD AUTO: 0.01 X10*3/UL (ref 0–0.5)
IMM GRANULOCYTES NFR BLD AUTO: 0.2 % (ref 0–0.9)
KETONES UR STRIP.AUTO-MCNC: NEGATIVE MG/DL
LEUKOCYTE ESTERASE UR QL STRIP.AUTO: NEGATIVE
LYMPHOCYTES # BLD AUTO: 0.93 X10*3/UL (ref 0.8–3)
LYMPHOCYTES NFR BLD AUTO: 18.8 %
MAGNESIUM SERPL-MCNC: 1.79 MG/DL (ref 1.6–2.4)
MCH RBC QN AUTO: 34.6 PG (ref 26–34)
MCHC RBC AUTO-ENTMCNC: 32.6 G/DL (ref 32–36)
MCV RBC AUTO: 106 FL (ref 80–100)
MONOCYTES # BLD AUTO: 0.38 X10*3/UL (ref 0.05–0.8)
MONOCYTES NFR BLD AUTO: 7.7 %
MUCOUS THREADS #/AREA URNS AUTO: NORMAL /LPF
NEUTROPHILS # BLD AUTO: 3.24 X10*3/UL (ref 1.6–5.5)
NEUTROPHILS NFR BLD AUTO: 65.2 %
NITRITE UR QL STRIP.AUTO: NEGATIVE
NRBC BLD-RTO: 0 /100 WBCS (ref 0–0)
PH UR STRIP.AUTO: 5.5 [PH]
PHENOBARB SERPL-MCNC: 22 UG/ML (ref 10–40)
PHENYTOIN SERPL-MCNC: 13.4 UG/ML (ref 10–20)
PLATELET # BLD AUTO: 199 X10*3/UL (ref 150–450)
POTASSIUM SERPL-SCNC: 4 MMOL/L (ref 3.5–5.3)
PROT SERPL-MCNC: 6.7 G/DL (ref 6.4–8.2)
PROT UR STRIP.AUTO-MCNC: ABNORMAL MG/DL
RBC # BLD AUTO: 3.61 X10*6/UL (ref 4.5–5.9)
RBC # UR STRIP.AUTO: NEGATIVE MG/DL
RBC #/AREA URNS AUTO: NORMAL /HPF
SODIUM SERPL-SCNC: 141 MMOL/L (ref 136–145)
SP GR UR STRIP.AUTO: 1.03
T4 FREE SERPL-MCNC: 0.79 NG/DL (ref 0.61–1.12)
TSH SERPL-ACNC: 0.24 MIU/L (ref 0.44–3.98)
UROBILINOGEN UR STRIP.AUTO-MCNC: ABNORMAL MG/DL
WBC # BLD AUTO: 5 X10*3/UL (ref 4.4–11.3)
WBC #/AREA URNS AUTO: NORMAL /HPF

## 2025-05-20 PROCEDURE — 74176 CT ABD & PELVIS W/O CONTRAST: CPT

## 2025-05-20 PROCEDURE — 84443 ASSAY THYROID STIM HORMONE: CPT | Performed by: PHYSICIAN ASSISTANT

## 2025-05-20 PROCEDURE — 70450 CT HEAD/BRAIN W/O DYE: CPT | Performed by: STUDENT IN AN ORGANIZED HEALTH CARE EDUCATION/TRAINING PROGRAM

## 2025-05-20 PROCEDURE — 2500000002 HC RX 250 W HCPCS SELF ADMINISTERED DRUGS (ALT 637 FOR MEDICARE OP, ALT 636 FOR OP/ED): Performed by: PHYSICIAN ASSISTANT

## 2025-05-20 PROCEDURE — 36415 COLL VENOUS BLD VENIPUNCTURE: CPT | Performed by: PHYSICIAN ASSISTANT

## 2025-05-20 PROCEDURE — 99223 1ST HOSP IP/OBS HIGH 75: CPT | Performed by: PHYSICIAN ASSISTANT

## 2025-05-20 PROCEDURE — 84484 ASSAY OF TROPONIN QUANT: CPT | Performed by: PHYSICIAN ASSISTANT

## 2025-05-20 PROCEDURE — 2500000001 HC RX 250 WO HCPCS SELF ADMINISTERED DRUGS (ALT 637 FOR MEDICARE OP): Performed by: PHYSICIAN ASSISTANT

## 2025-05-20 PROCEDURE — G0378 HOSPITAL OBSERVATION PER HR: HCPCS

## 2025-05-20 PROCEDURE — 80183 DRUG SCRN QUANT OXCARBAZEPIN: CPT | Performed by: PHYSICIAN ASSISTANT

## 2025-05-20 PROCEDURE — 2500000001 HC RX 250 WO HCPCS SELF ADMINISTERED DRUGS (ALT 637 FOR MEDICARE OP)

## 2025-05-20 PROCEDURE — 93005 ELECTROCARDIOGRAM TRACING: CPT

## 2025-05-20 PROCEDURE — 72125 CT NECK SPINE W/O DYE: CPT

## 2025-05-20 PROCEDURE — 72125 CT NECK SPINE W/O DYE: CPT | Performed by: STUDENT IN AN ORGANIZED HEALTH CARE EDUCATION/TRAINING PROGRAM

## 2025-05-20 PROCEDURE — 99285 EMERGENCY DEPT VISIT HI MDM: CPT | Mod: 25 | Performed by: STUDENT IN AN ORGANIZED HEALTH CARE EDUCATION/TRAINING PROGRAM

## 2025-05-20 PROCEDURE — 80185 ASSAY OF PHENYTOIN TOTAL: CPT | Performed by: PHYSICIAN ASSISTANT

## 2025-05-20 PROCEDURE — 80184 ASSAY OF PHENOBARBITAL: CPT | Performed by: PHYSICIAN ASSISTANT

## 2025-05-20 PROCEDURE — 74176 CT ABD & PELVIS W/O CONTRAST: CPT | Performed by: STUDENT IN AN ORGANIZED HEALTH CARE EDUCATION/TRAINING PROGRAM

## 2025-05-20 PROCEDURE — 72131 CT LUMBAR SPINE W/O DYE: CPT | Performed by: STUDENT IN AN ORGANIZED HEALTH CARE EDUCATION/TRAINING PROGRAM

## 2025-05-20 PROCEDURE — 85025 COMPLETE CBC W/AUTO DIFF WBC: CPT | Performed by: PHYSICIAN ASSISTANT

## 2025-05-20 PROCEDURE — 83735 ASSAY OF MAGNESIUM: CPT | Performed by: PHYSICIAN ASSISTANT

## 2025-05-20 PROCEDURE — 84439 ASSAY OF FREE THYROXINE: CPT | Performed by: PHYSICIAN ASSISTANT

## 2025-05-20 PROCEDURE — 72128 CT CHEST SPINE W/O DYE: CPT

## 2025-05-20 PROCEDURE — 81001 URINALYSIS AUTO W/SCOPE: CPT | Performed by: PHYSICIAN ASSISTANT

## 2025-05-20 PROCEDURE — 93880 EXTRACRANIAL BILAT STUDY: CPT | Performed by: SURGERY

## 2025-05-20 PROCEDURE — 80053 COMPREHEN METABOLIC PANEL: CPT | Performed by: PHYSICIAN ASSISTANT

## 2025-05-20 PROCEDURE — 82565 ASSAY OF CREATININE: CPT | Performed by: PHYSICIAN ASSISTANT

## 2025-05-20 PROCEDURE — 93880 EXTRACRANIAL BILAT STUDY: CPT

## 2025-05-20 PROCEDURE — 72128 CT CHEST SPINE W/O DYE: CPT | Performed by: STUDENT IN AN ORGANIZED HEALTH CARE EDUCATION/TRAINING PROGRAM

## 2025-05-20 PROCEDURE — 70450 CT HEAD/BRAIN W/O DYE: CPT

## 2025-05-20 RX ORDER — ACETAMINOPHEN 500 MG
50 TABLET ORAL DAILY
COMMUNITY

## 2025-05-20 RX ORDER — DONEPEZIL HYDROCHLORIDE 10 MG/1
10 TABLET, FILM COATED ORAL EVERY MORNING
Status: DISCONTINUED | OUTPATIENT
Start: 2025-05-21 | End: 2025-05-28 | Stop reason: HOSPADM

## 2025-05-20 RX ORDER — ACETAMINOPHEN 160 MG/5ML
650 SOLUTION ORAL EVERY 4 HOURS PRN
Status: DISCONTINUED | OUTPATIENT
Start: 2025-05-20 | End: 2025-05-24

## 2025-05-20 RX ORDER — VITAMIN B COMPLEX
1 CAPSULE ORAL DAILY
COMMUNITY

## 2025-05-20 RX ORDER — OXCARBAZEPINE 600 MG/1
900 TABLET, FILM COATED ORAL NIGHTLY
COMMUNITY

## 2025-05-20 RX ORDER — OXCARBAZEPINE 150 MG/1
900 TABLET, FILM COATED ORAL NIGHTLY
Status: DISCONTINUED | OUTPATIENT
Start: 2025-05-20 | End: 2025-05-28 | Stop reason: HOSPADM

## 2025-05-20 RX ORDER — ACETAMINOPHEN 650 MG/1
650 SUPPOSITORY RECTAL EVERY 4 HOURS PRN
Status: DISCONTINUED | OUTPATIENT
Start: 2025-05-20 | End: 2025-05-24

## 2025-05-20 RX ORDER — MECLIZINE HYDROCHLORIDE 25 MG/1
25 TABLET ORAL 3 TIMES DAILY PRN
Status: DISCONTINUED | OUTPATIENT
Start: 2025-05-20 | End: 2025-05-27

## 2025-05-20 RX ORDER — PHENOBARBITAL 32.4 MG/1
64.8 TABLET ORAL 2 TIMES DAILY
Status: DISCONTINUED | OUTPATIENT
Start: 2025-05-20 | End: 2025-05-28 | Stop reason: HOSPADM

## 2025-05-20 RX ORDER — NAPROXEN SODIUM 220 MG/1
81 TABLET, FILM COATED ORAL DAILY
Status: DISCONTINUED | OUTPATIENT
Start: 2025-05-21 | End: 2025-05-28 | Stop reason: HOSPADM

## 2025-05-20 RX ORDER — PHENYTOIN SODIUM 100 MG/1
200 CAPSULE, EXTENDED RELEASE ORAL 2 TIMES DAILY
Status: DISCONTINUED | OUTPATIENT
Start: 2025-05-20 | End: 2025-05-28 | Stop reason: HOSPADM

## 2025-05-20 RX ORDER — SERTRALINE HYDROCHLORIDE 50 MG/1
50 TABLET, FILM COATED ORAL 2 TIMES DAILY
Status: DISCONTINUED | OUTPATIENT
Start: 2025-05-20 | End: 2025-05-28 | Stop reason: HOSPADM

## 2025-05-20 RX ORDER — ALPRAZOLAM 0.25 MG/1
0.25 TABLET ORAL EVERY MORNING
Status: DISCONTINUED | OUTPATIENT
Start: 2025-05-20 | End: 2025-05-28 | Stop reason: HOSPADM

## 2025-05-20 RX ORDER — CHOLECALCIFEROL (VITAMIN D3) 25 MCG
50 TABLET ORAL DAILY
Status: DISCONTINUED | OUTPATIENT
Start: 2025-05-20 | End: 2025-05-28 | Stop reason: HOSPADM

## 2025-05-20 RX ORDER — ATORVASTATIN CALCIUM 40 MG/1
40 TABLET, FILM COATED ORAL NIGHTLY
Status: DISCONTINUED | OUTPATIENT
Start: 2025-05-20 | End: 2025-05-28 | Stop reason: HOSPADM

## 2025-05-20 RX ORDER — PHENYTOIN SODIUM 100 MG/1
CAPSULE, EXTENDED RELEASE ORAL
Status: COMPLETED
Start: 2025-05-20 | End: 2025-05-20

## 2025-05-20 RX ORDER — LISINOPRIL 5 MG/1
5 TABLET ORAL EVERY MORNING
Status: DISCONTINUED | OUTPATIENT
Start: 2025-05-21 | End: 2025-05-22

## 2025-05-20 RX ORDER — OXCARBAZEPINE 150 MG/1
1200 TABLET, FILM COATED ORAL EVERY MORNING
Status: DISCONTINUED | OUTPATIENT
Start: 2025-05-21 | End: 2025-05-28 | Stop reason: HOSPADM

## 2025-05-20 RX ORDER — PHENOBARBITAL 32.4 MG/1
TABLET ORAL
Status: COMPLETED
Start: 2025-05-20 | End: 2025-05-20

## 2025-05-20 RX ORDER — ALPRAZOLAM 0.25 MG/1
1 TABLET ORAL EVERY MORNING
COMMUNITY
Start: 2025-04-22

## 2025-05-20 RX ORDER — ACETAMINOPHEN 325 MG/1
650 TABLET ORAL EVERY 4 HOURS PRN
Status: DISCONTINUED | OUTPATIENT
Start: 2025-05-20 | End: 2025-05-24

## 2025-05-20 RX ORDER — OXCARBAZEPINE 600 MG/1
2 TABLET, FILM COATED ORAL EVERY MORNING
COMMUNITY
Start: 2025-04-06

## 2025-05-20 RX ORDER — METOPROLOL TARTRATE 100 MG/1
100 TABLET ORAL
Status: DISCONTINUED | OUTPATIENT
Start: 2025-05-20 | End: 2025-05-27

## 2025-05-20 RX ADMIN — PHENOBARBITAL 64.8 MG: 32.4 TABLET ORAL at 17:43

## 2025-05-20 RX ADMIN — ALPRAZOLAM 0.25 MG: 0.25 TABLET ORAL at 17:40

## 2025-05-20 RX ADMIN — Medication 50 MCG: at 17:40

## 2025-05-20 RX ADMIN — EXTENDED PHENYTOIN SODIUM 200 MG: 100 CAPSULE, EXTENDED RELEASE ORAL at 17:44

## 2025-05-20 RX ADMIN — METOPROLOL TARTRATE 100 MG: 100 TABLET, FILM COATED ORAL at 17:38

## 2025-05-20 RX ADMIN — OXCARBAZEPINE 900 MG: 150 TABLET, FILM COATED ORAL at 17:41

## 2025-05-20 RX ADMIN — RIVAROXABAN 20 MG: 20 TABLET, FILM COATED ORAL at 17:40

## 2025-05-20 RX ADMIN — ATORVASTATIN CALCIUM 40 MG: 40 TABLET, FILM COATED ORAL at 17:40

## 2025-05-20 RX ADMIN — SERTRALINE HYDROCHLORIDE 50 MG: 50 TABLET ORAL at 17:40

## 2025-05-20 SDOH — SOCIAL STABILITY: SOCIAL INSECURITY: ARE YOU OR HAVE YOU BEEN THREATENED OR ABUSED PHYSICALLY, EMOTIONALLY, OR SEXUALLY BY ANYONE?: NO

## 2025-05-20 SDOH — SOCIAL STABILITY: SOCIAL INSECURITY: DO YOU FEEL ANYONE HAS EXPLOITED OR TAKEN ADVANTAGE OF YOU FINANCIALLY OR OF YOUR PERSONAL PROPERTY?: NO

## 2025-05-20 SDOH — SOCIAL STABILITY: SOCIAL INSECURITY: HAVE YOU HAD THOUGHTS OF HARMING ANYONE ELSE?: NO

## 2025-05-20 SDOH — SOCIAL STABILITY: SOCIAL INSECURITY: HAS ANYONE EVER THREATENED TO HURT YOUR FAMILY OR YOUR PETS?: NO

## 2025-05-20 SDOH — SOCIAL STABILITY: SOCIAL INSECURITY: DOES ANYONE TRY TO KEEP YOU FROM HAVING/CONTACTING OTHER FRIENDS OR DOING THINGS OUTSIDE YOUR HOME?: NO

## 2025-05-20 SDOH — SOCIAL STABILITY: SOCIAL INSECURITY: ARE THERE ANY APPARENT SIGNS OF INJURIES/BEHAVIORS THAT COULD BE RELATED TO ABUSE/NEGLECT?: NO

## 2025-05-20 SDOH — SOCIAL STABILITY: SOCIAL INSECURITY: ABUSE: ADULT

## 2025-05-20 SDOH — SOCIAL STABILITY: SOCIAL INSECURITY: WERE YOU ABLE TO COMPLETE ALL THE BEHAVIORAL HEALTH SCREENINGS?: YES

## 2025-05-20 SDOH — SOCIAL STABILITY: SOCIAL INSECURITY: DO YOU FEEL UNSAFE GOING BACK TO THE PLACE WHERE YOU ARE LIVING?: NO

## 2025-05-20 SDOH — SOCIAL STABILITY: SOCIAL INSECURITY: HAVE YOU HAD ANY THOUGHTS OF HARMING ANYONE ELSE?: NO

## 2025-05-20 ASSESSMENT — COGNITIVE AND FUNCTIONAL STATUS - GENERAL
MOVING TO AND FROM BED TO CHAIR: A LOT
DRESSING REGULAR UPPER BODY CLOTHING: A LITTLE
STANDING UP FROM CHAIR USING ARMS: A LOT
DRESSING REGULAR LOWER BODY CLOTHING: A LOT
WALKING IN HOSPITAL ROOM: TOTAL
TOILETING: A LITTLE
MOBILITY SCORE: 14
PATIENT BASELINE BEDBOUND: NO
DAILY ACTIVITIY SCORE: 19
CLIMB 3 TO 5 STEPS WITH RAILING: TOTAL
HELP NEEDED FOR BATHING: A LITTLE

## 2025-05-20 ASSESSMENT — LIFESTYLE VARIABLES
TOTAL SCORE: 0
AUDIT-C TOTAL SCORE: 0
HAVE YOU EVER FELT YOU SHOULD CUT DOWN ON YOUR DRINKING: NO
HOW MANY STANDARD DRINKS CONTAINING ALCOHOL DO YOU HAVE ON A TYPICAL DAY: PATIENT DOES NOT DRINK
EVER HAD A DRINK FIRST THING IN THE MORNING TO STEADY YOUR NERVES TO GET RID OF A HANGOVER: NO
HAVE PEOPLE ANNOYED YOU BY CRITICIZING YOUR DRINKING: NO
HOW OFTEN DO YOU HAVE A DRINK CONTAINING ALCOHOL: NEVER
EVER FELT BAD OR GUILTY ABOUT YOUR DRINKING: NO
HOW OFTEN DO YOU HAVE 6 OR MORE DRINKS ON ONE OCCASION: NEVER
SKIP TO QUESTIONS 9-10: 1
AUDIT-C TOTAL SCORE: 0

## 2025-05-20 ASSESSMENT — ACTIVITIES OF DAILY LIVING (ADL)
PATIENT'S MEMORY ADEQUATE TO SAFELY COMPLETE DAILY ACTIVITIES?: YES
ASSISTIVE_DEVICE: WALKER;EYEGLASSES;DENTURES LOWER;DENTURES UPPER
HEARING - LEFT EAR: DIFFICULTY WITH NOISE
TOILETING: NEEDS ASSISTANCE
BATHING: NEEDS ASSISTANCE
FEEDING YOURSELF: NEEDS ASSISTANCE
GROOMING: NEEDS ASSISTANCE
JUDGMENT_ADEQUATE_SAFELY_COMPLETE_DAILY_ACTIVITIES: YES
DRESSING YOURSELF: NEEDS ASSISTANCE
WALKS IN HOME: NEEDS ASSISTANCE
ADEQUATE_TO_COMPLETE_ADL: YES
HEARING - RIGHT EAR: DIFFICULTY WITH NOISE

## 2025-05-20 ASSESSMENT — COLUMBIA-SUICIDE SEVERITY RATING SCALE - C-SSRS
2. HAVE YOU ACTUALLY HAD ANY THOUGHTS OF KILLING YOURSELF?: NO
1. IN THE PAST MONTH, HAVE YOU WISHED YOU WERE DEAD OR WISHED YOU COULD GO TO SLEEP AND NOT WAKE UP?: NO
6. HAVE YOU EVER DONE ANYTHING, STARTED TO DO ANYTHING, OR PREPARED TO DO ANYTHING TO END YOUR LIFE?: NO

## 2025-05-20 ASSESSMENT — PATIENT HEALTH QUESTIONNAIRE - PHQ9
SUM OF ALL RESPONSES TO PHQ9 QUESTIONS 1 & 2: 1
2. FEELING DOWN, DEPRESSED OR HOPELESS: SEVERAL DAYS
1. LITTLE INTEREST OR PLEASURE IN DOING THINGS: NOT AT ALL

## 2025-05-20 ASSESSMENT — PAIN - FUNCTIONAL ASSESSMENT: PAIN_FUNCTIONAL_ASSESSMENT: 0-10

## 2025-05-20 ASSESSMENT — PAIN SCALES - GENERAL
PAINLEVEL_OUTOF10: 0 - NO PAIN
PAINLEVEL_OUTOF10: 0 - NO PAIN

## 2025-05-20 NOTE — ED PROVIDER NOTES
HPI   Chief Complaint   Patient presents with    Weakness, Gen    Fall     Patient arrives from home with complaints of fall x2 in the past 3 days with increased generalized weakness that  has progressively gotten worse over the past 3 weeks.  Patient states he fell 3 days ago by sliding out of a chair and again this am.  He states he felt his knees give out this morning and fell onto his left side.  He denies any pain or injury, he states he did not hit his head, no LOC.       HPIRandoasif Dejesus is a 75 year old male with a PMHx of Afib on Xarelto s/p ablation, tachy marilee s/p PPM, seizures, dementia who presented from home today because of fall.  t reports he was very dizzy when he woke up and tried to use two canes for stability then fell.  This has happened multiple times already in the past.  He was just seen at [facility] few days ago for same.  He did not hit his head.  He landed sideways from the chair.  He states he really does not have any pain anywhere.  His dizziness feels better.  Denies LOC. He is on xarelto and ASA 81 mg. Reports he has had 3-4 falls in the past few months due to dizziness. He feels like the dizziness is due to his medications. Reports recent PPM interrogation.  States that he has been in contact with his neurologist who states they most likely are going to do MRI secondary to more frequent falls he does have Lewy body dementia.  He is here with his wife currently.  His neurologist is Dr. Cook.  Neurologist also ordered some levels for some of his medications as there was some increase in some medicines as well.  Wife request that hopefully we can get those levels today because they are going to follow-up with the neurologist.  Denying any chest pain shortness of breath nausea or vomiting he otherwise feels his normal self.  He states he was having a small amount of back pain this morning on the left side.  Always feels like his knees give out on him as well.  Denying any knee pain or  swelling.  States he is urinating okay moving his bowels okay no saddle anesthesia no urinary incontinence.  Patient has been using more canes to ambulate and this has been increasing last 2 weeks.        Patient History   Medical History[1]  Surgical History[2]  Family History[3]  Social History[4]    Physical Exam   ED Triage Vitals [05/20/25 0748]   Temperature Heart Rate Respirations BP   36.5 °C (97.7 °F) 89 20 (!) 182/91      Pulse Ox Temp Source Heart Rate Source Patient Position   94 % Temporal Monitor Sitting      BP Location FiO2 (%)     Left arm --       Physical Exam    Reviewed family history social history and allergies and were noncontributory to current problem.    Review of systems as noted in history of present illness  otherwise negative. All other systems were reviewed and negative.     PMHX: Chronic conditions: reviewd in EMR, relevant history noted in HPI                Surgeries, hospitalizations: reviewed in EMR , relevant history noted in HPI                Medications: reviewed in EMR, relevant history noted in HPI                Allergies: reviewed in EMR, relevant history noted in HPI      PHYSICAL EXAM:    GENERAL/ CONSTITUTIONAL: Vitals noted, no distress. Alert and oriented  x 3. Non-toxic.  No Drooling or stridor .  Pleasant    HEAD: Normocephalic Atraumatic    EENT: TMs clear. Posterior oropharynx unremarkable. No meningismus. No LAD.  No exudate present.      EYES: PERRLA EOMI     NECK: Supple. Nontender. No midline tenderness.  Full range of motion    CARDIAC: Regular, rate, rhythm. No murmurs rubs or gallops. No JVD    PULMONARY: Lungs clear bilaterally with good aeration. No wheezes rales or rhonchi. No respiratory distress.  No pain with deep inspiration    GI: Soft, . Nontender. No peritoneal signs. Normoactive bowel sounds. No pulsatile masses.  No guarding or rebound    EXTREMITIES/MUSCULOSKELTAL: BI minimal peripheral edema. Negative Homans bilaterally, NVIT, pedis pulses +2  /4 equal. FROM in all extremities and equal.  Shoulder has no tenderness noted.  Full range of motion arm elbow wrist.  Patient is able to flex and extend at knee joint patient is able to sit up without much difficulty.  Patient is able to hold his legs, in a stable position.  No lower extremity findings of weakness.  No bruising noted.  No step-offs noted.  Patient is able to rotate and flex at back without any difficulty.  No tenderness thoracic spine lumbar spine noted however patient spoke of some pain on the left flank area.   strength is equal plus 5 out of 5.    SKIN: No rash. Intact.     NEURO: No focal neurologic deficits,     PSYCH: appropriate mood and affect    MEDICAL DECISION MAKING:      ED Course & MDM   ED Course as of 05/20/25 1220   Tue May 20, 2025   1018 No acute findings in the abdomen and pelvis.  2. 3 mm right upper pole nonobstructing calculus, without  hydronephrosis.  3. Hepatomegaly and diffuse steatosis.       [CV]   1018 No acute intracranial hemorrhage, mass effect, or CT apparent acute  infarct. Chronic microvascular ischemia and involutional changes.      Cervical spine:  No acute fracture or traumatic malalignment.  Multilevel degenerative changes of the cervical spine, most prominent  at C5-C6 and C6-C7.      Thoracic spine:  No acute fracture or traumatic malalignment.  No significant degenerative changes of the thoracic spine.  8 mm right upper lobe pulmonary nodule.      Lumbar spine:  No acute fracture or traumatic malalignment.  Multilevel degenerative changes of the lumbar spine, most prominent  at L4-L5 and L5-S1.   [CV]   1019 A solid non-calcified pulmonary nodule measuring 6-8 mm in the right  upper lobe.Consider follow up non contrast chest CT at 6-12 months,  then consider CT chest at 18-24 months (Braxton Cavanaugh et al.,  Guidelines for management of incidental pulmonary nodules detected on  CT images: From the Fleischner Society 2017, Radiology. 2017  Jul;284  (1):228-243.) [id number]   [CV]      ED Course User Index  [CV] Melani Haley PA-C         Diagnoses as of 25 1220   Dizziness   Difficulty walking   At this point I did order the blood work that the neurologist requested since patient will be getting other blood work care.  I will check a urine I will scan his to make sure he does not have any kidney stone is was speaking of some flank pain this morning.  I will still scan his back as well since he has been having difficulty walking.    No acute fractures noted.  Pulmonary nodule is noted.  Patient feels that he is not safe at home walking and his wife wants him checked out further since he been having frequent falls of the last 2 to 3 weeks.  Will seek admission for him    Twelve-lead EKG ordered shows atrial paced complexes borderline T wave abnormalities no STEMI interpreted by Dr. Obdulio Gaines.               No data recorded     Vern Coma Scale Score: 15 (25 0757 : Zohaib Meeks RN)                           Medical Decision Making  [facility] accepted admission    Procedure  Procedures       Melani Haley PA-C  25 1159         [1] No past medical history on file.  [2]   Past Surgical History:  Procedure Laterality Date    HERNIA REPAIR  2016    Hernia Repair    OTHER SURGICAL HISTORY  2016    Cardiac Cath Procedure Outcome: Successful   [3] No family history on file.  [4]   Social History  Tobacco Use    Smoking status: Former     Types: Cigarettes     Start date:      Quit date: 1968     Years since quittin.4    Smokeless tobacco: Never   Substance Use Topics    Alcohol use: Not Currently    Drug use: Never        Melani Haley PA-C  25 1220

## 2025-05-20 NOTE — PROGRESS NOTES
Pharmacy Medication History Review    Jose Luis Dejesus is a 75 y.o. male admitted for Dizziness. Pharmacy reviewed the patient's [id number] medications and allergies for accuracy.    The list below reflectives the updated PTA list. Please review each medication in order reconciliation for additional clarification and justification.  Prior to Admission medications    Medication Sig Start Date End Date Taking? Authorizing Provider   ALPRAZolam (Xanax) 0.25 mg tablet Take 1 tablet (0.25 mg) by mouth once daily in the morning. 4/22/25  Yes Historical Provider, MD   aspirin 81 mg chewable tablet Chew 1 tablet (81 mg) once daily. 6/21/22  Yes Historical Provider, MD   atorvastatin (Lipitor) 40 mg tablet Take 1 tablet (40 mg) by mouth once daily at bedtime. 1/29/25  Yes Historical Provider, MD   b complex vitamins capsule Take 1 capsule by mouth once daily.   Yes Historical Provider, MD   cholecalciferol (D3-2000) 50 mcg (2,000 units) capsule Take 1 capsule (50 mcg) by mouth once daily.   Yes Historical Provider, MD   donepezil (Aricept) 10 mg tablet Please take 10 mg/day  Patient taking differently: Take 1 tablet (10 mg) by mouth once daily in the morning. 11/19/24  Yes Eddie Morin MD   lisinopril 5 mg tablet Take 1 tablet (5 mg) by mouth once daily in the morning. 1/5/25  Yes Historical Provider, MD   metoprolol tartrate (Lopressor) 100 mg tablet Take 1 tablet (100 mg) by mouth every 12 hours. 7/28/24  Yes Historical Provider, MD   OXcarbazepine (Trileptal) 600 mg tablet Take 2 tablets (1,200 mg) by mouth once daily in the morning. 4/6/25  Yes Historical Provider, MD   OXcarbazepine (Trileptal) 600 mg tablet Take 1.5 tablets (900 mg) by mouth once daily at bedtime.   Yes Historical Provider, MD   PHENobarbital (Luminal) 64.8 mg tablet Take 1 tablet (64.8 mg) by mouth 2 times a day. 3/3/25  Yes Eddie Morin MD   phenytoin ER (Dilantin) 100 mg capsule TAKE TWO CAPSULES BY MOUTH TWO TIMES A DAY 4/14/25  Yes  Eddie Morin MD   rivaroxaban (Xarelto) 20 mg tablet Take 1 tablet (20 mg) by mouth once daily in the evening. Take with meals. 1/11/22 5/20/25 Yes Historical Provider, MD   sertraline (Zoloft) 50 mg tablet Take 1 tablet (50 mg) by mouth 2 times a day. 4/17/25  Yes Historical Provider, MD   amiodarone (Pacerone) 200 mg tablet Take 1 tablet (200 mg) by mouth early in the morning..  Patient not taking: Reported on 11/19/2024 8/22/24  no Historical Provider, MD   topiramate (Topamax) 50 mg tablet Week 1 - 25 mg (0.5 tabs) in the morning.  Week 2 - 25 mg (0.5 tabs) twice a day.  Week 3 - 50 mg (1 tab) in the morning, 25 mg (0.5 tabs) in the evening.  Week 4 and beyond - 50 mg (1 tab) twice a day  Patient not taking: Reported on 5/20/2025 4/1/25  no Eddie Morin MD        The list below reflectives the updated allergy list. Please review each documented allergy for additional clarification and justification.  Allergies  Reviewed by Zohaib Meeks RN on 5/20/2025   No Known Allergies         Below are additional concerns with the patient's PTA list.        Nishi Bernard

## 2025-05-20 NOTE — ED TRIAGE NOTES
Patient arrives from home with complaints of fall x2 in the past 3 days with increased generalized weakness that  has progressively gotten worse over the past 3 weeks.  Patient states he fell 3 days ago by sliding out of a chair and again this am.  He states he felt his knees give out this morning and fell onto his left side.  He denies any pain or injury, he states he did not hit his head, no LOC.

## 2025-05-20 NOTE — H&P
History Of Present Illness  Jose Luis Dejesus is a 75 y.o. male with past medical history significant for obesity, atrial fibrillation (on Xarelto) s/p ablation, tachybradycardia syndrome s/p PPM, hypertension, history of seizures, and Lewy body dementia who presents to the ED from home for evaluation after a fall.  Reports he woke up this morning and walked to the bathroom and back with no issues.  He sat down at his computer in his room for bed and when he stood up his when he noticed a sudden onset of dizziness.  Says he will lower himself to the ground when he starts to feel dizzy, to avoid hitting his head.  Says he landed on his side and does report some mild left-sided back pain.  Has happened multiple times in the past already, reports 3-4 falls in the past month secondary to dizziness and generally feeling weak in his knees.  Patient states the dizziness is mainly when he stands up, however sometimes it can come upon intermittently even if he has sitting.  Was seen at [facility] a couple days ago for similar complaint, workup at that time was unremarkable.  Denies any loss of consciousness or head injury.  Denies any current pains.  Denies any urinary/bowel changes or saddle anesthesia.  Does report the dizziness feels better currently in the ED.  Patient states he has been seen by neurologist, Dr. Morin, who discussed doing an MRI for further evaluation.  Neurology ordered lab levels for some of his medications because of recent increase in the medications, and stated they would like to do an MRI if lab levels were within normal limits.  Patient does not currently have any complaints at this time.  Denies headaches, vision changes, chest pain, shortness of breath or cough, abdominal pain or nausea, appetite changes, urinary/bowel changes, or fever/chills.  Wife is at bedside who helps with additional information.  States patient has been dealing with difficulty ambulating and dizziness for the past couple years  however states it seems like it is worsening over the past month or 2.  Patient usually ambulates with a walker at baseline, however over the past 2 weeks he has been needing additional assistance.    ED course: On arrival to the ED, patient afebrile, hypertensive with a blood pressure 182/91, otherwise hemodynamically stable with SpO2 94% on room air.  Glucose 93, electrolytes WNL, renal function WNL, LFTs WNL.  Magnesium 1.79.  Troponin 4.  WBC WNL, hemoglobin 12.5/hematocrit 38.4, platelets WNL.  Urinalysis unremarkable.  CT head shows no acute intracranial hemorrhage, mass effect, or acute infarct.  Shows chronic microvascular ischemia and involutional changes.  CT cervical and thoracic spine and lumbar spine shows no acute fracture or traumatic malalignment.  Shows 8 mm right upper lobe pulmonary nodule.  CT abdomen/pelvis shows no acute findings, 3 mm right upper pole nonobstructing calculus without hydronephrosis, and hepatomegaly and diffuse steatosis.         Past Medical History  Medical History[1]    Surgical History  Surgical History[2]     Social History  He reports that he quit smoking about 57 years ago. His smoking use included cigarettes. He started smoking about 31 years ago. He has never used smokeless tobacco. He reports that he does not currently use alcohol. He reports that he does not use drugs.    Family History  Family History[3]     Allergies  Patient has no known allergies.    Review of Systems  10 point review of system negative except as noted above in HPI    Physical Exam  Constitutional:       General: He is not in acute distress.     Appearance: Normal appearance. He is not toxic-appearing.   HENT:      Head: Normocephalic and atraumatic.      Mouth/Throat:      Pharynx: Oropharynx is clear.   Eyes:      Conjunctiva/sclera: Conjunctivae normal.   Cardiovascular:      Rate and Rhythm: Normal rate and regular rhythm.      Pulses: Normal pulses.      Heart sounds: Normal heart sounds.    Pulmonary:      Effort: Pulmonary effort is normal. No respiratory distress.      Breath sounds: Normal breath sounds. No wheezing.   Abdominal:      General: Bowel sounds are normal.      Palpations: Abdomen is soft.      Tenderness: There is no abdominal tenderness.   Musculoskeletal:         General: No tenderness.   Skin:     General: Skin is warm and dry.      Findings: Bruising present.      Comments: Scattered ecchymosis to bilateral upper and lower extremities, no signs of bleeding.  No open wounds noted.   Neurological:      Mental Status: He is alert. Mental status is at baseline.      Motor: Weakness present.   Psychiatric:         Mood and Affect: Mood normal.         Behavior: Behavior normal.       Last Recorded Vitals  Blood pressure 124/56, pulse 62, temperature 36.5 °C (97.7 °F), temperature source Temporal, resp. rate 18, height 1.829 m (6'), weight 125 kg (275 lb), SpO2 99%.    Relevant Results  Results for orders placed or performed during the hospital encounter of 05/20/25 (from the past 24 hours)   Phenytoin level, total (dilantin)   Result Value Ref Range    Phenytoin 13.4 10.0 - 20.0 ug/mL   Phenobarbital level   Result Value Ref Range    Phenobarbital  22.0 10.0 - 40.0 ug/mL   CBC and Auto Differential   Result Value Ref Range    WBC 5.0 4.4 - 11.3 x10*3/uL    nRBC 0.0 0.0 - 0.0 /100 WBCs    RBC 3.61 (L) 4.50 - 5.90 x10*6/uL    Hemoglobin 12.5 (L) 13.5 - 17.5 g/dL    Hematocrit 38.4 (L) 41.0 - 52.0 %     (H) 80 - 100 fL    MCH 34.6 (H) 26.0 - 34.0 pg    MCHC 32.6 32.0 - 36.0 g/dL    RDW 13.8 11.5 - 14.5 %    Platelets 199 150 - 450 x10*3/uL    Neutrophils % 65.2 40.0 - 80.0 %    Immature Granulocytes %, Automated 0.2 0.0 - 0.9 %    Lymphocytes % 18.8 13.0 - 44.0 %    Monocytes % 7.7 2.0 - 10.0 %    Eosinophils % 7.1 0.0 - 6.0 %    Basophils % 1.0 0.0 - 2.0 %    Neutrophils Absolute 3.24 1.60 - 5.50 x10*3/uL    Immature Granulocytes Absolute, Automated 0.01 0.00 - 0.50 x10*3/uL     Lymphocytes Absolute 0.93 0.80 - 3.00 x10*3/uL    Monocytes Absolute 0.38 0.05 - 0.80 x10*3/uL    Eosinophils Absolute 0.35 0.00 - 0.40 x10*3/uL    Basophils Absolute 0.05 0.00 - 0.10 x10*3/uL   Magnesium   Result Value Ref Range    Magnesium 1.79 1.60 - 2.40 mg/dL   Comprehensive metabolic panel   Result Value Ref Range    Glucose 93 74 - 99 mg/dL    Sodium 141 136 - 145 mmol/L    Potassium 4.0 3.5 - 5.3 mmol/L    Chloride 107 98 - 107 mmol/L    Bicarbonate 28 21 - 32 mmol/L    Anion Gap 10 10 - 20 mmol/L    Urea Nitrogen 20 6 - 23 mg/dL    Creatinine 0.85 0.50 - 1.30 mg/dL    eGFR 90 >60 mL/min/1.73m*2    Calcium 9.1 8.6 - 10.3 mg/dL    Albumin 4.0 3.4 - 5.0 g/dL    Alkaline Phosphatase 51 33 - 136 U/L    Total Protein 6.7 6.4 - 8.2 g/dL    AST 21 9 - 39 U/L    Bilirubin, Total 0.5 0.0 - 1.2 mg/dL    ALT 18 10 - 52 U/L   Troponin I, High Sensitivity, Initial   Result Value Ref Range    Troponin I, High Sensitivity 4 0 - 20 ng/L   Urinalysis with Reflex Culture and Microscopic   Result Value Ref Range    Color, Urine Yellow Light-Yellow, Yellow, Dark-Yellow    Appearance, Urine Clear Clear    Specific Gravity, Urine 1.030 1.005 - 1.035    pH, Urine 5.5 5.0, 5.5, 6.0, 6.5, 7.0, 7.5, 8.0    Protein, Urine 10 (TRACE) NEGATIVE, 10 (TRACE), 20 (TRACE) mg/dL    Glucose, Urine Normal Normal mg/dL    Blood, Urine NEGATIVE NEGATIVE mg/dL    Ketones, Urine NEGATIVE NEGATIVE mg/dL    Bilirubin, Urine NEGATIVE NEGATIVE mg/dL    Urobilinogen, Urine 2 (1+) (A) Normal mg/dL    Nitrite, Urine NEGATIVE NEGATIVE    Leukocyte Esterase, Urine NEGATIVE NEGATIVE   Urinalysis Microscopic   Result Value Ref Range    WBC, Urine NONE 1-5, NONE /HPF    RBC, Urine 1-2 NONE, 1-2, 3-5 /HPF    Mucus, Urine FEW Reference range not established. /LPF    Calcium Oxalate Crystals, Urine 1+ NONE, 1+ /HPF   Troponin, High Sensitivity, 1 Hour   Result Value Ref Range    Troponin I, High Sensitivity 4 0 - 20 ng/L   TSH with reflex to Free T4 if abnormal    Result Value Ref Range    Thyroid Stimulating Hormone 0.24 (L) 0.44 - 3.98 mIU/L   Thyroxine, Free   Result Value Ref Range    Thyroxine, Free 0.79 0.61 - 1.12 ng/dL   Vascular US Carotid Artery Duplex Bilateral   Result Value Ref Range    BSA 2.52 m2      CT abdomen pelvis wo IV contrast  Result Date: 5/20/2025  Interpreted By:  Xavier Perez, STUDY: CT ABDOMEN PELVIS WO IV CONTRAST;  5/20/2025 9:08 am   INDICATION: Signs/Symptoms:back pain flank.   COMPARISON: None   ACCESSION NUMBER(S): [id number]   ORDERING CLINICIAN: TYLER YOUNG   TECHNIQUE: CT of the abdomen and pelvis was performed. Contiguous axial images were obtained at 3 mm slice thickness through the abdomen and pelvis. Coronal and sagittal reconstructions at 3 mm slice thickness were performed.  No intravenous contrast was administered.   FINDINGS: Please note that the evaluation of vessels, lymph nodes and organs is limited without intravenous contrast.   LOWER CHEST: Mild bibasilar atelectasis.   ABDOMEN:   LIVER: The liver is enlarged measuring 23.9 cm in craniocaudal dimension and demonstrates diffusely decreased attenuation suggesting steatosis. No focal lesion.   BILE DUCTS: The intrahepatic and extrahepatic ducts are not dilated.   GALLBLADDER: No calcified stones. No wall thickening.   PANCREAS: Within normal limits.   SPLEEN: Within normal limits.   ADRENAL GLANDS: Bilateral adrenal glands appear normal.   KIDNEYS AND URETERS: The kidneys are normal in size and unremarkable in appearance.  3 mm right upper pole nonobstructing calculus, without hydronephrosis.   PELVIS:   Evaluation of the urinary bladder is limited by streak artifact from bilateral hip hardware.   BLADDER: Within the limits of evaluation, the urinary bladder appears within normal limits.   REPRODUCTIVE ORGANS: No pelvic mass.   BOWEL: Status post gastric sleeve resection. No bowel dilation or significant wall thickening. Large colonic stool. Normal appendix.    VESSELS: Severe atherosclerotic changes are noted to the aorta and branching vessels. There is no aneurysmal dilatation.   PERITONEUM/RETROPERITONEUM/LYMPH NODES: No ascites or free air, no fluid collection.  No enlarged mesenteric lymph nodes.   ABDOMINAL WALL: Within normal limits.   BONES: No suspicious osseous lesions are identified. Degenerative discogenic disease is noted in the lower thoracic and lumbar spine. Bilateral hip arthroplasty.       1. No acute findings in the abdomen and pelvis. 2. 3 mm right upper pole nonobstructing calculus, without hydronephrosis. 3. Hepatomegaly and diffuse steatosis.   Signed by: Xavier Perez 5/20/2025 10:08 AM Dictation workstation:   [id number]    CT cervical spine wo IV contrast  Result Date: 5/20/2025  Interpreted By:  Xavier Perez, STUDY: CT HEAD WO IV CONTRAST; CT CERVICAL SPINE WO IV CONTRAST; CT THORACIC SPINE WO IV CONTRAST; CT LUMBAR SPINE RETROSPECTIVE RECONSTRUCTION PROTOCOL;  5/20/2025 9:08 am   INDICATION: Signs/Symptoms:fall on left side; Signs/Symptoms:fall flexed neck forward; Signs/Symptoms:difficulty walking; Signs/Symptoms:back pain.   COMPARISON: MRI brain 08/30/2024   ACCESSION NUMBER(S): [id number]; [id number]; [id number]; [id number]   ORDERING CLINICIAN: TYLER YOUNG   TECHNIQUE: Noncontrast axial CT scan of head and complete spine was performed.   FINDINGS: BRAIN:   Parenchyma: There is no intracranial hemorrhage. The grey-white differentiation is intact. There is no mass effect or midline shift. Patchy supratentorial hypodensity, nonspecific, but likely secondary to mild chronic microvascular ischemia.   CSF Spaces: Mild generalized volume loss, with concordant ventricular enlargement.   Extra-Axial Fluid: There is no extraaxial fluid collection.   Calvarium: The calvarium is unremarkable.   Paranasal sinuses: Mild paranasal sinus mucosal thickening.   Mastoids: Clear.   Orbits: Bilateral lens replacements.   Soft tissues:  Unremarkable.   CERVICAL SPINE:   ALIGNMENT: Straightening of the cervical lordosis. Atlantoaxial interval is maintained. Vertebral body heights are within normal limits. Multilevel loss of disc spaces throughout the cervical spine.   VERTEBRAL BODIES AND POSTERIOR ELEMENTS: No cervical spine compression fracture.  No posterior element fracture.  No destructive bone lesion.   SPINAL CANAL: Multilevel degenerative changes of the cervical spine with up to mild canal stenosis andmoderate neural foraminal narrowing, most prominent at C5-C6 and C6-C7   NECK SOFT TISSUES: Within normal limits.   LUNG APICES: Imaged portion of the lung apices are within normal limits.   SKULL BASE: Within normal limits.     THORACIC SPINE:   Trauma: No acute fracture.   Alignment: Within normal limits.   Vertebral Body Heights: The thoracic vertebral body heights are intact.   Intervertebral Discs:  The disc spaces are grossly preserved.   Degenerative change: There is no significant spinal canal stenosis. No high-grade neural foraminal narrowing.   Paraspinous Soft Tissues: Within normal limits.   8 mm right upper lobe nodule (series 2, image 44).     LUMBAR SPINE:   Trauma: No acute fracture.   Alignment: Within normal limits.   Vertebral Body Heights: The lumbar vertebral body heights are intact.   Intervertebral Discs:  The disc spaces are grossly preserved.   Degenerative change: Mild diffuse osteopenia. Multilevel degenerative changes of the lumbar spine with up to mild canal stenosis andmoderate neural foraminal narrowing, most prominent at L4-L5 and L5-S1   Paraspinous Soft Tissues: Within normal limits.         Brain: No acute intracranial hemorrhage, mass effect, or CT apparent acute infarct. Chronic microvascular ischemia and involutional changes.   Cervical spine: No acute fracture or traumatic malalignment. Multilevel degenerative changes of the cervical spine, most prominent at C5-C6 and C6-C7.   Thoracic spine: No acute  fracture or traumatic malalignment. No significant degenerative changes of the thoracic spine. 8 mm right upper lobe pulmonary nodule.   Lumbar spine: No acute fracture or traumatic malalignment. Multilevel degenerative changes of the lumbar spine, most prominent at L4-L5 and L5-S1.   A solid non-calcified pulmonary nodule measuring 6-8 mm in the right upper lobe.Consider follow up non contrast chest CT at 6-12 months, then consider CT chest at 18-24 months (Braxton Cavanaugh et al., Guidelines for management of incidental pulmonary nodules detected on CT images: From the Fleischner Society 2017, Radiology. 2017 Jul;284 (1):228-243.) [email or web address]     Signed by: Xavier Perez 5/20/2025 10:04 AM Dictation workstation:   [id number]    CT head wo IV contrast  Result Date: 5/20/2025  Interpreted By:  Xavier Perez, STUDY: CT HEAD WO IV CONTRAST; CT CERVICAL SPINE WO IV CONTRAST; CT THORACIC SPINE WO IV CONTRAST; CT LUMBAR SPINE RETROSPECTIVE RECONSTRUCTION PROTOCOL;  5/20/2025 9:08 am   INDICATION: Signs/Symptoms:fall on left side; Signs/Symptoms:fall flexed neck forward; Signs/Symptoms:difficulty walking; Signs/Symptoms:back pain.   COMPARISON: MRI brain 08/30/2024   ACCESSION NUMBER(S): [id number]; [id number]; [id number]; [id number]   ORDERING CLINICIAN: TYLER YOUNG   TECHNIQUE: Noncontrast axial CT scan of head and complete spine was performed.   FINDINGS: BRAIN:   Parenchyma: There is no intracranial hemorrhage. The grey-white differentiation is intact. There is no mass effect or midline shift. Patchy supratentorial hypodensity, nonspecific, but likely secondary to mild chronic microvascular ischemia.   CSF Spaces: Mild generalized volume loss, with concordant ventricular enlargement.   Extra-Axial Fluid: There is no extraaxial fluid collection.   Calvarium: The calvarium is unremarkable.   Paranasal sinuses: Mild paranasal sinus mucosal thickening.   Mastoids: Clear.   Orbits: Bilateral lens  replacements.   Soft tissues: Unremarkable.   CERVICAL SPINE:   ALIGNMENT: Straightening of the cervical lordosis. Atlantoaxial interval is maintained. Vertebral body heights are within normal limits. Multilevel loss of disc spaces throughout the cervical spine.   VERTEBRAL BODIES AND POSTERIOR ELEMENTS: No cervical spine compression fracture.  No posterior element fracture.  No destructive bone lesion.   SPINAL CANAL: Multilevel degenerative changes of the cervical spine with up to mild canal stenosis andmoderate neural foraminal narrowing, most prominent at C5-C6 and C6-C7   NECK SOFT TISSUES: Within normal limits.   LUNG APICES: Imaged portion of the lung apices are within normal limits.   SKULL BASE: Within normal limits.     THORACIC SPINE:   Trauma: No acute fracture.   Alignment: Within normal limits.   Vertebral Body Heights: The thoracic vertebral body heights are intact.   Intervertebral Discs:  The disc spaces are grossly preserved.   Degenerative change: There is no significant spinal canal stenosis. No high-grade neural foraminal narrowing.   Paraspinous Soft Tissues: Within normal limits.   8 mm right upper lobe nodule (series 2, image 44).     LUMBAR SPINE:   Trauma: No acute fracture.   Alignment: Within normal limits.   Vertebral Body Heights: The lumbar vertebral body heights are intact.   Intervertebral Discs:  The disc spaces are grossly preserved.   Degenerative change: Mild diffuse osteopenia. Multilevel degenerative changes of the lumbar spine with up to mild canal stenosis andmoderate neural foraminal narrowing, most prominent at L4-L5 and L5-S1   Paraspinous Soft Tissues: Within normal limits.         Brain: No acute intracranial hemorrhage, mass effect, or CT apparent acute infarct. Chronic microvascular ischemia and involutional changes.   Cervical spine: No acute fracture or traumatic malalignment. Multilevel degenerative changes of the cervical spine, most prominent at C5-C6 and C6-C7.    Thoracic spine: No acute fracture or traumatic malalignment. No significant degenerative changes of the thoracic spine. 8 mm right upper lobe pulmonary nodule.   Lumbar spine: No acute fracture or traumatic malalignment. Multilevel degenerative changes of the lumbar spine, most prominent at L4-L5 and L5-S1.   A solid non-calcified pulmonary nodule measuring 6-8 mm in the right upper lobe.Consider follow up non contrast chest CT at 6-12 months, then consider CT chest at 18-24 months (Braxton Cavanaugh et al., Guidelines for management of incidental pulmonary nodules detected on CT images: From the Fleischner Society 2017, Radiology. 2017 Jul;284 (1):228-243.) [email or web address]     Signed by: Xavier Perez 5/20/2025 10:04 AM Dictation workstation:   [id number]    CT lumbar spine retrospective reconstruction protocol  Result Date: 5/20/2025  Interpreted By:  Xavier Perez, STUDY: CT HEAD WO IV CONTRAST; CT CERVICAL SPINE WO IV CONTRAST; CT THORACIC SPINE WO IV CONTRAST; CT LUMBAR SPINE RETROSPECTIVE RECONSTRUCTION PROTOCOL;  5/20/2025 9:08 am   INDICATION: Signs/Symptoms:fall on left side; Signs/Symptoms:fall flexed neck forward; Signs/Symptoms:difficulty walking; Signs/Symptoms:back pain.   COMPARISON: MRI brain 08/30/2024   ACCESSION NUMBER(S): [id number]; [id number]; [id number]; [id number]   ORDERING CLINICIAN: TYLER YOUNG   TECHNIQUE: Noncontrast axial CT scan of head and complete spine was performed.   FINDINGS: BRAIN:   Parenchyma: There is no intracranial hemorrhage. The grey-white differentiation is intact. There is no mass effect or midline shift. Patchy supratentorial hypodensity, nonspecific, but likely secondary to mild chronic microvascular ischemia.   CSF Spaces: Mild generalized volume loss, with concordant ventricular enlargement.   Extra-Axial Fluid: There is no extraaxial fluid collection.   Calvarium: The calvarium is unremarkable.   Paranasal sinuses: Mild paranasal sinus mucosal  thickening.   Mastoids: Clear.   Orbits: Bilateral lens replacements.   Soft tissues: Unremarkable.   CERVICAL SPINE:   ALIGNMENT: Straightening of the cervical lordosis. Atlantoaxial interval is maintained. Vertebral body heights are within normal limits. Multilevel loss of disc spaces throughout the cervical spine.   VERTEBRAL BODIES AND POSTERIOR ELEMENTS: No cervical spine compression fracture.  No posterior element fracture.  No destructive bone lesion.   SPINAL CANAL: Multilevel degenerative changes of the cervical spine with up to mild canal stenosis andmoderate neural foraminal narrowing, most prominent at C5-C6 and C6-C7   NECK SOFT TISSUES: Within normal limits.   LUNG APICES: Imaged portion of the lung apices are within normal limits.   SKULL BASE: Within normal limits.     THORACIC SPINE:   Trauma: No acute fracture.   Alignment: Within normal limits.   Vertebral Body Heights: The thoracic vertebral body heights are intact.   Intervertebral Discs:  The disc spaces are grossly preserved.   Degenerative change: There is no significant spinal canal stenosis. No high-grade neural foraminal narrowing.   Paraspinous Soft Tissues: Within normal limits.   8 mm right upper lobe nodule (series 2, image 44).     LUMBAR SPINE:   Trauma: No acute fracture.   Alignment: Within normal limits.   Vertebral Body Heights: The lumbar vertebral body heights are intact.   Intervertebral Discs:  The disc spaces are grossly preserved.   Degenerative change: Mild diffuse osteopenia. Multilevel degenerative changes of the lumbar spine with up to mild canal stenosis andmoderate neural foraminal narrowing, most prominent at L4-L5 and L5-S1   Paraspinous Soft Tissues: Within normal limits.         Brain: No acute intracranial hemorrhage, mass effect, or CT apparent acute infarct. Chronic microvascular ischemia and involutional changes.   Cervical spine: No acute fracture or traumatic malalignment. Multilevel degenerative changes of  the cervical spine, most prominent at C5-C6 and C6-C7.   Thoracic spine: No acute fracture or traumatic malalignment. No significant degenerative changes of the thoracic spine. 8 mm right upper lobe pulmonary nodule.   Lumbar spine: No acute fracture or traumatic malalignment. Multilevel degenerative changes of the lumbar spine, most prominent at L4-L5 and L5-S1.   A solid non-calcified pulmonary nodule measuring 6-8 mm in the right upper lobe.Consider follow up non contrast chest CT at 6-12 months, then consider CT chest at 18-24 months (Braxton Cavanaugh et al., Guidelines for management of incidental pulmonary nodules detected on CT images: From the Fleischner Society 2017, Radiology. 2017 Jul;284 (1):228-243.) [email or web address]     Signed by: Xavier Perez 5/20/2025 10:04 AM Dictation workstation:   [id number]    CT thoracic spine wo IV contrast  Result Date: 5/20/2025  Interpreted By:  Xavier Perez, STUDY: CT HEAD WO IV CONTRAST; CT CERVICAL SPINE WO IV CONTRAST; CT THORACIC SPINE WO IV CONTRAST; CT LUMBAR SPINE RETROSPECTIVE RECONSTRUCTION PROTOCOL;  5/20/2025 9:08 am   INDICATION: Signs/Symptoms:fall on left side; Signs/Symptoms:fall flexed neck forward; Signs/Symptoms:difficulty walking; Signs/Symptoms:back pain.   COMPARISON: MRI brain 08/30/2024   ACCESSION NUMBER(S): [id number]; [id number]; [id number]; [id number]   ORDERING CLINICIAN: TYLER YOUNG   TECHNIQUE: Noncontrast axial CT scan of head and complete spine was performed.   FINDINGS: BRAIN:   Parenchyma: There is no intracranial hemorrhage. The grey-white differentiation is intact. There is no mass effect or midline shift. Patchy supratentorial hypodensity, nonspecific, but likely secondary to mild chronic microvascular ischemia.   CSF Spaces: Mild generalized volume loss, with concordant ventricular enlargement.   Extra-Axial Fluid: There is no extraaxial fluid collection.   Calvarium: The calvarium is unremarkable.   Paranasal  sinuses: Mild paranasal sinus mucosal thickening.   Mastoids: Clear.   Orbits: Bilateral lens replacements.   Soft tissues: Unremarkable.   CERVICAL SPINE:   ALIGNMENT: Straightening of the cervical lordosis. Atlantoaxial interval is maintained. Vertebral body heights are within normal limits. Multilevel loss of disc spaces throughout the cervical spine.   VERTEBRAL BODIES AND POSTERIOR ELEMENTS: No cervical spine compression fracture.  No posterior element fracture.  No destructive bone lesion.   SPINAL CANAL: Multilevel degenerative changes of the cervical spine with up to mild canal stenosis andmoderate neural foraminal narrowing, most prominent at C5-C6 and C6-C7   NECK SOFT TISSUES: Within normal limits.   LUNG APICES: Imaged portion of the lung apices are within normal limits.   SKULL BASE: Within normal limits.     THORACIC SPINE:   Trauma: No acute fracture.   Alignment: Within normal limits.   Vertebral Body Heights: The thoracic vertebral body heights are intact.   Intervertebral Discs:  The disc spaces are grossly preserved.   Degenerative change: There is no significant spinal canal stenosis. No high-grade neural foraminal narrowing.   Paraspinous Soft Tissues: Within normal limits.   8 mm right upper lobe nodule (series 2, image 44).     LUMBAR SPINE:   Trauma: No acute fracture.   Alignment: Within normal limits.   Vertebral Body Heights: The lumbar vertebral body heights are intact.   Intervertebral Discs:  The disc spaces are grossly preserved.   Degenerative change: Mild diffuse osteopenia. Multilevel degenerative changes of the lumbar spine with up to mild canal stenosis andmoderate neural foraminal narrowing, most prominent at L4-L5 and L5-S1   Paraspinous Soft Tissues: Within normal limits.         Brain: No acute intracranial hemorrhage, mass effect, or CT apparent acute infarct. Chronic microvascular ischemia and involutional changes.   Cervical spine: No acute fracture or traumatic  malalignment. Multilevel degenerative changes of the cervical spine, most prominent at C5-C6 and C6-C7.   Thoracic spine: No acute fracture or traumatic malalignment. No significant degenerative changes of the thoracic spine. 8 mm right upper lobe pulmonary nodule.   Lumbar spine: No acute fracture or traumatic malalignment. Multilevel degenerative changes of the lumbar spine, most prominent at L4-L5 and L5-S1.   A solid non-calcified pulmonary nodule measuring 6-8 mm in the right upper lobe.Consider follow up non contrast chest CT at 6-12 months, then consider CT chest at 18-24 months (Braxton Cavanaugh et al., Guidelines for management of incidental pulmonary nodules detected on CT images: From the Fleischner Society 2017, Radiology. 2017 Jul;284 (1):228-243.) [id number]     Signed by: Xavier Perez 5/20/2025 10:04 AM Dictation workstation:   [id number]  Assessment & Plan  Dizziness    Multiple falls    Difficulty walking    Lewy body dementia (Multi)    HTN (hypertension)    Pulmonary nodule      Jose Luis Dejesus is a 75 y.o. male presents to the ED from home for evaluation after a fall.  Reports he woke up this morning and walked to the bathroom and back with no issues.  He sat down at his computer in his room for bed and when he stood up his when he noticed a sudden onset of dizziness.  Says he will lower himself to the ground when he starts to feel dizzy, to avoid hitting his head.  Says he landed on his side and does report some mild left-sided back pain.  Has happened multiple times in the past already, reports 3-4 falls in the past month secondary to dizziness and generally feeling weak in his knees.  Patient states the dizziness is mainly when he stands up, however sometimes it can come upon intermittently even if he has sitting.  Was seen at [facility] a couple days ago for similar complaint, workup at that time was unremarkable.  Denies any loss of consciousness or head injury.  Denies any current  pains. Patient states he has been seen by neurologist, Dr. Morin, who discussed doing an MRI for further evaluation.  Neurology ordered lab levels for some of his medications because of recent increase in the medications, and stated they would like to do an MRI if lab levels were within normal limits. Wife states patient has been dealing with difficulty ambulating and dizziness for the past couple years however states it seems like it is worsening over the past month or two.    #Dizziness  #Difficulty with ambulation  #Multiple falls, worsening over the past couple months  #Generalized weakness  #Lewy body dementia  Admit for observation with telemetry monitoring  Patient afebrile, no leukocytosis.  Renal function WNL.  Electrolytes WNL.  -CT head shows no acute intracranial hemorrhage, mass effect, or acute infarct.  Shows chronic microvascular ischemia and involutional changes.  -Urinalysis unremarkable.  CT cervical, thoracic, and lumbar spine showed no acute fracture or traumatic malalignment.  Patient follows with neurology, Dr. Morin  Some of patient's home medications recently adjusted, so Dr. Morin ordered lab levels for Dilantin and phenobarbital which were both within normal limits.  Oxcarbazepine level ordered today in the ED, pending.  **Patient will need MRI of the head, however has pacemaker so he will need to have this done at [facility]  PT/OT for evaluation and treatment  Social work consult for discharge plan  Echocardiogram ordered  Carotid ultrasound ordered  Orthostatic vitals ordered  Every 4 vitals  CBC, BMP in the a.m.; TSH added onto lab work  Cardiac diet  Tylenol, meclizine as needed   Fall precautions; bed alarm     -Most recent echocardiogram per EMR review 11/28/2023: Normal left ventricular systolic function, LVEF 60%, no hemodynamically significant valve disease.    #RUL pulmonary nodule noted on CT  Follow-up chest CT 6/12 months    #3 mm right upper pole nonobstructing  calculus noted on CT, no hydronephrosis  No abdominal tenderness, monitor  Renal function, LFTs WNL    Continue home medications as appropriate    Chronic conditions:  Lewy body dementia, obesity, A-fib, tachybradycardia syndrome, history of seizures, hypertension    #DVT prophylaxis  Continue home Xarelto  SCDs, ambulation as tolerated    I spent 70 minutes in the professional and overall care of this patient.  Patient fully evaluated on May 20.  Continue to monitor orthostatic vitals.  Recheck labs in AM.  Plan as above.    Dipesh Lopez MD         [1] No past medical history on file.  [2]   Past Surgical History:  Procedure Laterality Date    HERNIA REPAIR  05/26/2016    Hernia Repair    OTHER SURGICAL HISTORY  05/26/2016    Cardiac Cath Procedure Outcome: Successful   [3] No family history on file.

## 2025-05-20 NOTE — TELEPHONE ENCOUNTER
I think he should be admitted to the hospital.  He might need further workup in the hospital.  They can work on placement as well.

## 2025-05-20 NOTE — H&P
History Of Present Illness  Jose Luis Dejesus is a 75 y.o. male with past medical history significant for obesity, atrial fibrillation (on Xarelto) s/p ablation, tachybradycardia syndrome s/p PPM, hypertension, history of seizures, and Lewy body dementia who presents to the ED from home for evaluation after a fall.  Reports he woke up this morning and walked to the bathroom and back with no issues.  He sat down at his computer in his room for bed and when he stood up his when he noticed a sudden onset of dizziness.  Says he will lower himself to the ground when he starts to feel dizzy, to avoid hitting his head.  Says he landed on his side and does report some mild left-sided back pain.  Has happened multiple times in the past already, reports 3-4 falls in the past month secondary to dizziness and generally feeling weak in his knees.  Patient states the dizziness is mainly when he stands up, however sometimes it can come upon intermittently even if he has sitting.  Was seen at [facility] a couple days ago for similar complaint, workup at that time was unremarkable.  Denies any loss of consciousness or head injury.  Denies any current pains.  Denies any urinary/bowel changes or saddle anesthesia.  Does report the dizziness feels better currently in the ED.  Patient states he has been seen by neurologist, Dr. Morin, who discussed doing an MRI for further evaluation.  Neurology ordered lab levels for some of his medications because of recent increase in the medications, and stated they would like to do an MRI if lab levels were within normal limits.  Patient does not currently have any complaints at this time.  Denies headaches, vision changes, chest pain, shortness of breath or cough, abdominal pain or nausea, appetite changes, urinary/bowel changes, or fever/chills.  Wife is at bedside who helps with additional information.  States patient has been dealing with difficulty ambulating and dizziness for the past couple years  however states it seems like it is worsening over the past month or 2.  Patient usually ambulates with a walker at baseline, however over the past 2 weeks he has been needing additional assistance.    ED course: On arrival to the ED, patient afebrile, hypertensive with a blood pressure 182/91, otherwise hemodynamically stable with SpO2 94% on room air.  Glucose 93, electrolytes WNL, renal function WNL, LFTs WNL.  Magnesium 1.79.  Troponin 4.  WBC WNL, hemoglobin 12.5/hematocrit 38.4, platelets WNL.  Urinalysis unremarkable.  CT head shows no acute intracranial hemorrhage, mass effect, or acute infarct.  Shows chronic microvascular ischemia and involutional changes.  CT cervical and thoracic spine and lumbar spine shows no acute fracture or traumatic malalignment.  Shows 8 mm right upper lobe pulmonary nodule.  CT abdomen/pelvis shows no acute findings, 3 mm right upper pole nonobstructing calculus without hydronephrosis, and hepatomegaly and diffuse steatosis.    Admitting provider is Dr. Lopez      Past Medical History  Medical History[1]    Surgical History  Surgical History[2]     Social History  He reports that he quit smoking about 57 years ago. His smoking use included cigarettes. He started smoking about 31 years ago. He has never used smokeless tobacco. He reports that he does not currently use alcohol. He reports that he does not use drugs.    Family History  Family History[3]     Allergies  Patient has no known allergies.    Review of Systems  10 point review of system negative except as noted above in HPI    Physical Exam  Constitutional:       General: He is not in acute distress.     Appearance: Normal appearance. He is not toxic-appearing.   HENT:      Head: Normocephalic and atraumatic.      Mouth/Throat:      Pharynx: Oropharynx is clear.   Eyes:      Conjunctiva/sclera: Conjunctivae normal.   Cardiovascular:      Rate and Rhythm: Normal rate and regular rhythm.      Pulses: Normal pulses.       Heart sounds: Normal heart sounds.   Pulmonary:      Effort: Pulmonary effort is normal. No respiratory distress.      Breath sounds: Normal breath sounds. No wheezing.   Abdominal:      General: Bowel sounds are normal.      Palpations: Abdomen is soft.      Tenderness: There is no abdominal tenderness.   Musculoskeletal:         General: No tenderness.   Skin:     General: Skin is warm and dry.      Findings: Bruising present.      Comments: Scattered ecchymosis to bilateral upper and lower extremities, no signs of bleeding.  No open wounds noted.   Neurological:      Mental Status: He is alert. Mental status is at baseline.      Motor: Weakness present.   Psychiatric:         Mood and Affect: Mood normal.         Behavior: Behavior normal.       Last Recorded Vitals  Blood pressure (!) 182/91, pulse 89, temperature 36.5 °C (97.7 °F), temperature source Temporal, resp. rate 20, height 1.829 m (6'), weight 125 kg (275 lb), SpO2 94%.    Relevant Results  Results for orders placed or performed during the hospital encounter of 05/20/25 (from the past 24 hours)   Phenytoin level, total (dilantin)   Result Value Ref Range    Phenytoin 13.4 10.0 - 20.0 ug/mL   Phenobarbital level   Result Value Ref Range    Phenobarbital  22.0 10.0 - 40.0 ug/mL   CBC and Auto Differential   Result Value Ref Range    WBC 5.0 4.4 - 11.3 x10*3/uL    nRBC 0.0 0.0 - 0.0 /100 WBCs    RBC 3.61 (L) 4.50 - 5.90 x10*6/uL    Hemoglobin 12.5 (L) 13.5 - 17.5 g/dL    Hematocrit 38.4 (L) 41.0 - 52.0 %     (H) 80 - 100 fL    MCH 34.6 (H) 26.0 - 34.0 pg    MCHC 32.6 32.0 - 36.0 g/dL    RDW 13.8 11.5 - 14.5 %    Platelets 199 150 - 450 x10*3/uL    Neutrophils % 65.2 40.0 - 80.0 %    Immature Granulocytes %, Automated 0.2 0.0 - 0.9 %    Lymphocytes % 18.8 13.0 - 44.0 %    Monocytes % 7.7 2.0 - 10.0 %    Eosinophils % 7.1 0.0 - 6.0 %    Basophils % 1.0 0.0 - 2.0 %    Neutrophils Absolute 3.24 1.60 - 5.50 x10*3/uL    Immature Granulocytes Absolute,  Automated 0.01 0.00 - 0.50 x10*3/uL    Lymphocytes Absolute 0.93 0.80 - 3.00 x10*3/uL    Monocytes Absolute 0.38 0.05 - 0.80 x10*3/uL    Eosinophils Absolute 0.35 0.00 - 0.40 x10*3/uL    Basophils Absolute 0.05 0.00 - 0.10 x10*3/uL   Magnesium   Result Value Ref Range    Magnesium 1.79 1.60 - 2.40 mg/dL   Comprehensive metabolic panel   Result Value Ref Range    Glucose 93 74 - 99 mg/dL    Sodium 141 136 - 145 mmol/L    Potassium 4.0 3.5 - 5.3 mmol/L    Chloride 107 98 - 107 mmol/L    Bicarbonate 28 21 - 32 mmol/L    Anion Gap 10 10 - 20 mmol/L    Urea Nitrogen 20 6 - 23 mg/dL    Creatinine 0.85 0.50 - 1.30 mg/dL    eGFR 90 >60 mL/min/1.73m*2    Calcium 9.1 8.6 - 10.3 mg/dL    Albumin 4.0 3.4 - 5.0 g/dL    Alkaline Phosphatase 51 33 - 136 U/L    Total Protein 6.7 6.4 - 8.2 g/dL    AST 21 9 - 39 U/L    Bilirubin, Total 0.5 0.0 - 1.2 mg/dL    ALT 18 10 - 52 U/L   Troponin I, High Sensitivity, Initial   Result Value Ref Range    Troponin I, High Sensitivity 4 0 - 20 ng/L   Urinalysis with Reflex Culture and Microscopic   Result Value Ref Range    Color, Urine Yellow Light-Yellow, Yellow, Dark-Yellow    Appearance, Urine Clear Clear    Specific Gravity, Urine 1.030 1.005 - 1.035    pH, Urine 5.5 5.0, 5.5, 6.0, 6.5, 7.0, 7.5, 8.0    Protein, Urine 10 (TRACE) NEGATIVE, 10 (TRACE), 20 (TRACE) mg/dL    Glucose, Urine Normal Normal mg/dL    Blood, Urine NEGATIVE NEGATIVE mg/dL    Ketones, Urine NEGATIVE NEGATIVE mg/dL    Bilirubin, Urine NEGATIVE NEGATIVE mg/dL    Urobilinogen, Urine 2 (1+) (A) Normal mg/dL    Nitrite, Urine NEGATIVE NEGATIVE    Leukocyte Esterase, Urine NEGATIVE NEGATIVE   Urinalysis Microscopic   Result Value Ref Range    WBC, Urine NONE 1-5, NONE /HPF    RBC, Urine 1-2 NONE, 1-2, 3-5 /HPF    Mucus, Urine FEW Reference range not established. /LPF    Calcium Oxalate Crystals, Urine 1+ NONE, 1+ /HPF   Troponin, High Sensitivity, 1 Hour   Result Value Ref Range    Troponin I, High Sensitivity 4 0 - 20 ng/L       CT abdomen pelvis wo IV contrast  Result Date: 5/20/2025  Interpreted By:  Xavier Perez, STUDY: CT ABDOMEN PELVIS WO IV CONTRAST;  5/20/2025 9:08 am   INDICATION: Signs/Symptoms:back pain flank.   COMPARISON: None   ACCESSION NUMBER(S): [id number]   ORDERING CLINICIAN: TYLER YOUNG   TECHNIQUE: CT of the abdomen and pelvis was performed. Contiguous axial images were obtained at 3 mm slice thickness through the abdomen and pelvis. Coronal and sagittal reconstructions at 3 mm slice thickness were performed.  No intravenous contrast was administered.   FINDINGS: Please note that the evaluation of vessels, lymph nodes and organs is limited without intravenous contrast.   LOWER CHEST: Mild bibasilar atelectasis.   ABDOMEN:   LIVER: The liver is enlarged measuring 23.9 cm in craniocaudal dimension and demonstrates diffusely decreased attenuation suggesting steatosis. No focal lesion.   BILE DUCTS: The intrahepatic and extrahepatic ducts are not dilated.   GALLBLADDER: No calcified stones. No wall thickening.   PANCREAS: Within normal limits.   SPLEEN: Within normal limits.   ADRENAL GLANDS: Bilateral adrenal glands appear normal.   KIDNEYS AND URETERS: The kidneys are normal in size and unremarkable in appearance.  3 mm right upper pole nonobstructing calculus, without hydronephrosis.   PELVIS:   Evaluation of the urinary bladder is limited by streak artifact from bilateral hip hardware.   BLADDER: Within the limits of evaluation, the urinary bladder appears within normal limits.   REPRODUCTIVE ORGANS: No pelvic mass.   BOWEL: Status post gastric sleeve resection. No bowel dilation or significant wall thickening. Large colonic stool. Normal appendix.   VESSELS: Severe atherosclerotic changes are noted to the aorta and branching vessels. There is no aneurysmal dilatation.   PERITONEUM/RETROPERITONEUM/LYMPH NODES: No ascites or free air, no fluid collection.  No enlarged mesenteric lymph nodes.   ABDOMINAL WALL:  Within normal limits.   BONES: No suspicious osseous lesions are identified. Degenerative discogenic disease is noted in the lower thoracic and lumbar spine. Bilateral hip arthroplasty.       1. No acute findings in the abdomen and pelvis. 2. 3 mm right upper pole nonobstructing calculus, without hydronephrosis. 3. Hepatomegaly and diffuse steatosis.   Signed by: Xavier Perez 5/20/2025 10:08 AM Dictation workstation:   [id number]    CT cervical spine wo IV contrast  Result Date: 5/20/2025  Interpreted By:  Xavier Perez, STUDY: CT HEAD WO IV CONTRAST; CT CERVICAL SPINE WO IV CONTRAST; CT THORACIC SPINE WO IV CONTRAST; CT LUMBAR SPINE RETROSPECTIVE RECONSTRUCTION PROTOCOL;  5/20/2025 9:08 am   INDICATION: Signs/Symptoms:fall on left side; Signs/Symptoms:fall flexed neck forward; Signs/Symptoms:difficulty walking; Signs/Symptoms:back pain.   COMPARISON: MRI brain 08/30/2024   ACCESSION NUMBER(S): [id number]; [id number]; [id number]; [id number]   ORDERING CLINICIAN: TYLER YOUNG   TECHNIQUE: Noncontrast axial CT scan of head and complete spine was performed.   FINDINGS: BRAIN:   Parenchyma: There is no intracranial hemorrhage. The grey-white differentiation is intact. There is no mass effect or midline shift. Patchy supratentorial hypodensity, nonspecific, but likely secondary to mild chronic microvascular ischemia.   CSF Spaces: Mild generalized volume loss, with concordant ventricular enlargement.   Extra-Axial Fluid: There is no extraaxial fluid collection.   Calvarium: The calvarium is unremarkable.   Paranasal sinuses: Mild paranasal sinus mucosal thickening.   Mastoids: Clear.   Orbits: Bilateral lens replacements.   Soft tissues: Unremarkable.   CERVICAL SPINE:   ALIGNMENT: Straightening of the cervical lordosis. Atlantoaxial interval is maintained. Vertebral body heights are within normal limits. Multilevel loss of disc spaces throughout the cervical spine.   VERTEBRAL BODIES AND POSTERIOR  ELEMENTS: No cervical spine compression fracture.  No posterior element fracture.  No destructive bone lesion.   SPINAL CANAL: Multilevel degenerative changes of the cervical spine with up to mild canal stenosis andmoderate neural foraminal narrowing, most prominent at C5-C6 and C6-C7   NECK SOFT TISSUES: Within normal limits.   LUNG APICES: Imaged portion of the lung apices are within normal limits.   SKULL BASE: Within normal limits.     THORACIC SPINE:   Trauma: No acute fracture.   Alignment: Within normal limits.   Vertebral Body Heights: The thoracic vertebral body heights are intact.   Intervertebral Discs:  The disc spaces are grossly preserved.   Degenerative change: There is no significant spinal canal stenosis. No high-grade neural foraminal narrowing.   Paraspinous Soft Tissues: Within normal limits.   8 mm right upper lobe nodule (series 2, image 44).     LUMBAR SPINE:   Trauma: No acute fracture.   Alignment: Within normal limits.   Vertebral Body Heights: The lumbar vertebral body heights are intact.   Intervertebral Discs:  The disc spaces are grossly preserved.   Degenerative change: Mild diffuse osteopenia. Multilevel degenerative changes of the lumbar spine with up to mild canal stenosis andmoderate neural foraminal narrowing, most prominent at L4-L5 and L5-S1   Paraspinous Soft Tissues: Within normal limits.         Brain: No acute intracranial hemorrhage, mass effect, or CT apparent acute infarct. Chronic microvascular ischemia and involutional changes.   Cervical spine: No acute fracture or traumatic malalignment. Multilevel degenerative changes of the cervical spine, most prominent at C5-C6 and C6-C7.   Thoracic spine: No acute fracture or traumatic malalignment. No significant degenerative changes of the thoracic spine. 8 mm right upper lobe pulmonary nodule.   Lumbar spine: No acute fracture or traumatic malalignment. Multilevel degenerative changes of the lumbar spine, most prominent at  L4-L5 and L5-S1.   A solid non-calcified pulmonary nodule measuring 6-8 mm in the right upper lobe.Consider follow up non contrast chest CT at 6-12 months, then consider CT chest at 18-24 months (Braxton Cavanaugh et al., Guidelines for management of incidental pulmonary nodules detected on CT images: From the Fleischner Society 2017, Radiology. 2017 Jul;284 (1):228-243.) [email or web address]     Signed by: Xavier Perez 5/20/2025 10:04 AM Dictation workstation:   [id number]    CT head wo IV contrast  Result Date: 5/20/2025  Interpreted By:  Xavier Perez, STUDY: CT HEAD WO IV CONTRAST; CT CERVICAL SPINE WO IV CONTRAST; CT THORACIC SPINE WO IV CONTRAST; CT LUMBAR SPINE RETROSPECTIVE RECONSTRUCTION PROTOCOL;  5/20/2025 9:08 am   INDICATION: Signs/Symptoms:fall on left side; Signs/Symptoms:fall flexed neck forward; Signs/Symptoms:difficulty walking; Signs/Symptoms:back pain.   COMPARISON: MRI brain 08/30/2024   ACCESSION NUMBER(S): [id number]; [id number]; [id number]; [id number]   ORDERING CLINICIAN: TYELR YOUNG   TECHNIQUE: Noncontrast axial CT scan of head and complete spine was performed.   FINDINGS: BRAIN:   Parenchyma: There is no intracranial hemorrhage. The grey-white differentiation is intact. There is no mass effect or midline shift. Patchy supratentorial hypodensity, nonspecific, but likely secondary to mild chronic microvascular ischemia.   CSF Spaces: Mild generalized volume loss, with concordant ventricular enlargement.   Extra-Axial Fluid: There is no extraaxial fluid collection.   Calvarium: The calvarium is unremarkable.   Paranasal sinuses: Mild paranasal sinus mucosal thickening.   Mastoids: Clear.   Orbits: Bilateral lens replacements.   Soft tissues: Unremarkable.   CERVICAL SPINE:   ALIGNMENT: Straightening of the cervical lordosis. Atlantoaxial interval is maintained. Vertebral body heights are within normal limits. Multilevel loss of disc spaces throughout the cervical spine.    VERTEBRAL BODIES AND POSTERIOR ELEMENTS: No cervical spine compression fracture.  No posterior element fracture.  No destructive bone lesion.   SPINAL CANAL: Multilevel degenerative changes of the cervical spine with up to mild canal stenosis andmoderate neural foraminal narrowing, most prominent at C5-C6 and C6-C7   NECK SOFT TISSUES: Within normal limits.   LUNG APICES: Imaged portion of the lung apices are within normal limits.   SKULL BASE: Within normal limits.     THORACIC SPINE:   Trauma: No acute fracture.   Alignment: Within normal limits.   Vertebral Body Heights: The thoracic vertebral body heights are intact.   Intervertebral Discs:  The disc spaces are grossly preserved.   Degenerative change: There is no significant spinal canal stenosis. No high-grade neural foraminal narrowing.   Paraspinous Soft Tissues: Within normal limits.   8 mm right upper lobe nodule (series 2, image 44).     LUMBAR SPINE:   Trauma: No acute fracture.   Alignment: Within normal limits.   Vertebral Body Heights: The lumbar vertebral body heights are intact.   Intervertebral Discs:  The disc spaces are grossly preserved.   Degenerative change: Mild diffuse osteopenia. Multilevel degenerative changes of the lumbar spine with up to mild canal stenosis andmoderate neural foraminal narrowing, most prominent at L4-L5 and L5-S1   Paraspinous Soft Tissues: Within normal limits.         Brain: No acute intracranial hemorrhage, mass effect, or CT apparent acute infarct. Chronic microvascular ischemia and involutional changes.   Cervical spine: No acute fracture or traumatic malalignment. Multilevel degenerative changes of the cervical spine, most prominent at C5-C6 and C6-C7.   Thoracic spine: No acute fracture or traumatic malalignment. No significant degenerative changes of the thoracic spine. 8 mm right upper lobe pulmonary nodule.   Lumbar spine: No acute fracture or traumatic malalignment. Multilevel degenerative changes of the  lumbar spine, most prominent at L4-L5 and L5-S1.   A solid non-calcified pulmonary nodule measuring 6-8 mm in the right upper lobe.Consider follow up non contrast chest CT at 6-12 months, then consider CT chest at 18-24 months (Braxton Cavanaugh et al., Guidelines for management of incidental pulmonary nodules detected on CT images: From the Fleischner Society 2017, Radiology. 2017 Jul;284 (1):228-243.) [email or web address]     Signed by: Xavier Perez 5/20/2025 10:04 AM Dictation workstation:   [id number]    CT lumbar spine retrospective reconstruction protocol  Result Date: 5/20/2025  Interpreted By:  Xavier Perez, STUDY: CT HEAD WO IV CONTRAST; CT CERVICAL SPINE WO IV CONTRAST; CT THORACIC SPINE WO IV CONTRAST; CT LUMBAR SPINE RETROSPECTIVE RECONSTRUCTION PROTOCOL;  5/20/2025 9:08 am   INDICATION: Signs/Symptoms:fall on left side; Signs/Symptoms:fall flexed neck forward; Signs/Symptoms:difficulty walking; Signs/Symptoms:back pain.   COMPARISON: MRI brain 08/30/2024   ACCESSION NUMBER(S): [id number]; [id number]; [id number]; [id number]   ORDERING CLINICIAN: TYLER YOUNG   TECHNIQUE: Noncontrast axial CT scan of head and complete spine was performed.   FINDINGS: BRAIN:   Parenchyma: There is no intracranial hemorrhage. The grey-white differentiation is intact. There is no mass effect or midline shift. Patchy supratentorial hypodensity, nonspecific, but likely secondary to mild chronic microvascular ischemia.   CSF Spaces: Mild generalized volume loss, with concordant ventricular enlargement.   Extra-Axial Fluid: There is no extraaxial fluid collection.   Calvarium: The calvarium is unremarkable.   Paranasal sinuses: Mild paranasal sinus mucosal thickening.   Mastoids: Clear.   Orbits: Bilateral lens replacements.   Soft tissues: Unremarkable.   CERVICAL SPINE:   ALIGNMENT: Straightening of the cervical lordosis. Atlantoaxial interval is maintained. Vertebral body heights are within normal limits.  Multilevel loss of disc spaces throughout the cervical spine.   VERTEBRAL BODIES AND POSTERIOR ELEMENTS: No cervical spine compression fracture.  No posterior element fracture.  No destructive bone lesion.   SPINAL CANAL: Multilevel degenerative changes of the cervical spine with up to mild canal stenosis andmoderate neural foraminal narrowing, most prominent at C5-C6 and C6-C7   NECK SOFT TISSUES: Within normal limits.   LUNG APICES: Imaged portion of the lung apices are within normal limits.   SKULL BASE: Within normal limits.     THORACIC SPINE:   Trauma: No acute fracture.   Alignment: Within normal limits.   Vertebral Body Heights: The thoracic vertebral body heights are intact.   Intervertebral Discs:  The disc spaces are grossly preserved.   Degenerative change: There is no significant spinal canal stenosis. No high-grade neural foraminal narrowing.   Paraspinous Soft Tissues: Within normal limits.   8 mm right upper lobe nodule (series 2, image 44).     LUMBAR SPINE:   Trauma: No acute fracture.   Alignment: Within normal limits.   Vertebral Body Heights: The lumbar vertebral body heights are intact.   Intervertebral Discs:  The disc spaces are grossly preserved.   Degenerative change: Mild diffuse osteopenia. Multilevel degenerative changes of the lumbar spine with up to mild canal stenosis andmoderate neural foraminal narrowing, most prominent at L4-L5 and L5-S1   Paraspinous Soft Tissues: Within normal limits.         Brain: No acute intracranial hemorrhage, mass effect, or CT apparent acute infarct. Chronic microvascular ischemia and involutional changes.   Cervical spine: No acute fracture or traumatic malalignment. Multilevel degenerative changes of the cervical spine, most prominent at C5-C6 and C6-C7.   Thoracic spine: No acute fracture or traumatic malalignment. No significant degenerative changes of the thoracic spine. 8 mm right upper lobe pulmonary nodule.   Lumbar spine: No acute fracture or  traumatic malalignment. Multilevel degenerative changes of the lumbar spine, most prominent at L4-L5 and L5-S1.   A solid non-calcified pulmonary nodule measuring 6-8 mm in the right upper lobe.Consider follow up non contrast chest CT at 6-12 months, then consider CT chest at 18-24 months (Braxton Cavanaugh et al., Guidelines for management of incidental pulmonary nodules detected on CT images: From the Fleischner Society 2017, Radiology. 2017 Jul;284 (1):228-243.) [email or web address]     Signed by: Xavier Perez 5/20/2025 10:04 AM Dictation workstation:   [id number]    CT thoracic spine wo IV contrast  Result Date: 5/20/2025  Interpreted By:  Xavier Perez, STUDY: CT HEAD WO IV CONTRAST; CT CERVICAL SPINE WO IV CONTRAST; CT THORACIC SPINE WO IV CONTRAST; CT LUMBAR SPINE RETROSPECTIVE RECONSTRUCTION PROTOCOL;  5/20/2025 9:08 am   INDICATION: Signs/Symptoms:fall on left side; Signs/Symptoms:fall flexed neck forward; Signs/Symptoms:difficulty walking; Signs/Symptoms:back pain.   COMPARISON: MRI brain 08/30/2024   ACCESSION NUMBER(S): [id number]; [id number]; [id number]; [id number]   ORDERING CLINICIAN: TYLER YOUNG   TECHNIQUE: Noncontrast axial CT scan of head and complete spine was performed.   FINDINGS: BRAIN:   Parenchyma: There is no intracranial hemorrhage. The grey-white differentiation is intact. There is no mass effect or midline shift. Patchy supratentorial hypodensity, nonspecific, but likely secondary to mild chronic microvascular ischemia.   CSF Spaces: Mild generalized volume loss, with concordant ventricular enlargement.   Extra-Axial Fluid: There is no extraaxial fluid collection.   Calvarium: The calvarium is unremarkable.   Paranasal sinuses: Mild paranasal sinus mucosal thickening.   Mastoids: Clear.   Orbits: Bilateral lens replacements.   Soft tissues: Unremarkable.   CERVICAL SPINE:   ALIGNMENT: Straightening of the cervical lordosis. Atlantoaxial interval is maintained. Vertebral  body heights are within normal limits. Multilevel loss of disc spaces throughout the cervical spine.   VERTEBRAL BODIES AND POSTERIOR ELEMENTS: No cervical spine compression fracture.  No posterior element fracture.  No destructive bone lesion.   SPINAL CANAL: Multilevel degenerative changes of the cervical spine with up to mild canal stenosis andmoderate neural foraminal narrowing, most prominent at C5-C6 and C6-C7   NECK SOFT TISSUES: Within normal limits.   LUNG APICES: Imaged portion of the lung apices are within normal limits.   SKULL BASE: Within normal limits.     THORACIC SPINE:   Trauma: No acute fracture.   Alignment: Within normal limits.   Vertebral Body Heights: The thoracic vertebral body heights are intact.   Intervertebral Discs:  The disc spaces are grossly preserved.   Degenerative change: There is no significant spinal canal stenosis. No high-grade neural foraminal narrowing.   Paraspinous Soft Tissues: Within normal limits.   8 mm right upper lobe nodule (series 2, image 44).     LUMBAR SPINE:   Trauma: No acute fracture.   Alignment: Within normal limits.   Vertebral Body Heights: The lumbar vertebral body heights are intact.   Intervertebral Discs:  The disc spaces are grossly preserved.   Degenerative change: Mild diffuse osteopenia. Multilevel degenerative changes of the lumbar spine with up to mild canal stenosis andmoderate neural foraminal narrowing, most prominent at L4-L5 and L5-S1   Paraspinous Soft Tissues: Within normal limits.         Brain: No acute intracranial hemorrhage, mass effect, or CT apparent acute infarct. Chronic microvascular ischemia and involutional changes.   Cervical spine: No acute fracture or traumatic malalignment. Multilevel degenerative changes of the cervical spine, most prominent at C5-C6 and C6-C7.   Thoracic spine: No acute fracture or traumatic malalignment. No significant degenerative changes of the thoracic spine. 8 mm right upper lobe pulmonary nodule.    Lumbar spine: No acute fracture or traumatic malalignment. Multilevel degenerative changes of the lumbar spine, most prominent at L4-L5 and L5-S1.   A solid non-calcified pulmonary nodule measuring 6-8 mm in the right upper lobe.Consider follow up non contrast chest CT at 6-12 months, then consider CT chest at 18-24 months (Braxton Cavanaugh et al., Guidelines for management of incidental pulmonary nodules detected on CT images: From the Fleischner Society 2017, Radiology. 2017 Jul;284 (1):228-243.) [id number]     Signed by: Xavier Perez 5/20/2025 10:04 AM Dictation workstation:   [id number]  Assessment & Plan  Dizziness    Multiple falls    Difficulty walking    Lewy body dementia (Multi)    HTN (hypertension)    Pulmonary nodule      Jose Luis Dejesus is a 75 y.o. male presents to the ED from home for evaluation after a fall.  Reports he woke up this morning and walked to the bathroom and back with no issues.  He sat down at his computer in his room for bed and when he stood up his when he noticed a sudden onset of dizziness.  Says he will lower himself to the ground when he starts to feel dizzy, to avoid hitting his head.  Says he landed on his side and does report some mild left-sided back pain.  Has happened multiple times in the past already, reports 3-4 falls in the past month secondary to dizziness and generally feeling weak in his knees.  Patient states the dizziness is mainly when he stands up, however sometimes it can come upon intermittently even if he has sitting.  Was seen at [facility] a couple days ago for similar complaint, workup at that time was unremarkable.  Denies any loss of consciousness or head injury.  Denies any current pains. Patient states he has been seen by neurologist, Dr. Morin, who discussed doing an MRI for further evaluation.  Neurology ordered lab levels for some of his medications because of recent increase in the medications, and stated they would like to do an MRI if lab  levels were within normal limits. Wife states patient has been dealing with difficulty ambulating and dizziness for the past couple years however states it seems like it is worsening over the past month or two.    #Dizziness  #Difficulty with ambulation  #Multiple falls, worsening over the past couple months  #Generalized weakness  #Lewy body dementia  Admit for observation with telemetry monitoring  Patient afebrile, no leukocytosis.  Renal function WNL.  Electrolytes WNL.  -CT head shows no acute intracranial hemorrhage, mass effect, or acute infarct.  Shows chronic microvascular ischemia and involutional changes.  -Urinalysis unremarkable.  CT cervical, thoracic, and lumbar spine showed no acute fracture or traumatic malalignment.  Patient follows with neurology, Dr. Morin  Some of patient's home medications recently adjusted, so Dr. Morin ordered lab levels for Dilantin and phenobarbital which were both within normal limits.  Oxcarbazepine level ordered today in the ED, pending.  **Patient will need MRI of the head, however has pacemaker so he will need to have this done at [facility]  PT/OT for evaluation and treatment  Social work consult for discharge plan  Echocardiogram ordered  Carotid ultrasound ordered  Orthostatic vitals ordered  Every 4 vitals  CBC, BMP in the a.m.; TSH added onto lab work  Cardiac diet  Tylenol, meclizine as needed   Fall precautions; bed alarm     -Most recent echocardiogram per EMR review 11/28/2023: Normal left ventricular systolic function, LVEF 60%, no hemodynamically significant valve disease.    #RUL pulmonary nodule noted on CT  Follow-up chest CT 6/12 months    #3 mm right upper pole nonobstructing calculus noted on CT, no hydronephrosis  No abdominal tenderness, monitor  Renal function, LFTs WNL    Continue home medications as appropriate    Chronic conditions:  Lewy body dementia, obesity, A-fib, tachybradycardia syndrome, history of seizures, hypertension    #DVT  prophylaxis  Continue home Xarelto  SCDs, ambulation as tolerated    I spent 70 minutes in the professional and overall care of this patient.      Chacorta Rose PA-C         [1] No past medical history on file.  [2]   Past Surgical History:  Procedure Laterality Date    HERNIA REPAIR  05/26/2016    Hernia Repair    OTHER SURGICAL HISTORY  05/26/2016    Cardiac Cath Procedure Outcome: Successful   [3] No family history on file.

## 2025-05-20 NOTE — TELEPHONE ENCOUNTER
I spoke w/ the daughter-in-law to let her know. She wanted me to let you know that Jose Luis is being admitted and the results of the tests they ran are in his chart if you wanted to take a look.

## 2025-05-21 ENCOUNTER — APPOINTMENT (OUTPATIENT)
Dept: CARDIOLOGY | Facility: HOSPITAL | Age: 75
DRG: 057 | End: 2025-05-21
Payer: MEDICARE

## 2025-05-21 ENCOUNTER — APPOINTMENT (OUTPATIENT)
Dept: RADIOLOGY | Facility: HOSPITAL | Age: 75
DRG: 057 | End: 2025-05-21
Payer: MEDICARE

## 2025-05-21 LAB
10OH-CARBAZEPINE SERPL-MCNC: 28 UG/ML (ref 10–35)
ANION GAP SERPL CALC-SCNC: 12 MMOL/L (ref 10–20)
AORTIC VALVE MEAN GRADIENT: 3 MMHG
AORTIC VALVE PEAK VELOCITY: 1.05 M/S
ATRIAL RATE: 0 BPM
AV PEAK GRADIENT: 4 MMHG
AVA (PEAK VEL): 2.69 CM2
AVA (VTI): 2.74 CM2
BUN SERPL-MCNC: 19 MG/DL (ref 6–23)
CALCIUM SERPL-MCNC: 8.7 MG/DL (ref 8.6–10.3)
CHLORIDE SERPL-SCNC: 108 MMOL/L (ref 98–107)
CO2 SERPL-SCNC: 25 MMOL/L (ref 21–32)
CREAT SERPL-MCNC: 0.72 MG/DL (ref 0.5–1.3)
EGFRCR SERPLBLD CKD-EPI 2021: >90 ML/MIN/1.73M*2
EJECTION FRACTION APICAL 4 CHAMBER: 63.1
EJECTION FRACTION: 66 %
ERYTHROCYTE [DISTWIDTH] IN BLOOD BY AUTOMATED COUNT: 15.1 % (ref 11.5–14.5)
GLUCOSE SERPL-MCNC: 87 MG/DL (ref 74–99)
HCT VFR BLD AUTO: 32 % (ref 41–52)
HGB BLD-MCNC: 11.4 G/DL (ref 13.5–17.5)
LEFT ATRIUM VOLUME AREA LENGTH INDEX BSA: 27.2 ML/M2
LEFT VENTRICLE INTERNAL DIMENSION DIASTOLE: 5.4 CM (ref 3.5–6)
LEFT VENTRICULAR OUTFLOW TRACT DIAMETER: 2.2 CM
MCH RBC QN AUTO: 38.4 PG (ref 26–34)
MCHC RBC AUTO-ENTMCNC: 35.6 G/DL (ref 32–36)
MCV RBC AUTO: 108 FL (ref 80–100)
MITRAL VALVE E/A RATIO: 1.8
NRBC BLD-RTO: 0 /100 WBCS (ref 0–0)
P AXIS: 0 DEGREES
PLATELET # BLD AUTO: 187 X10*3/UL (ref 150–450)
POTASSIUM SERPL-SCNC: 3.7 MMOL/L (ref 3.5–5.3)
PR INTERVAL: 71 MS
Q ONSET: 252 MS
QRS COUNT: 10 BEATS
QRS DURATION: 111 MS
QT INTERVAL: 456 MS
QTC CALCULATION(BAZETT): 475 MS
QTC FREDERICIA: 468 MS
R AXIS: 29 DEGREES
RBC # BLD AUTO: 2.97 X10*6/UL (ref 4.5–5.9)
RIGHT VENTRICLE FREE WALL PEAK S': 13.1 CM/S
SODIUM SERPL-SCNC: 141 MMOL/L (ref 136–145)
T AXIS: 16 DEGREES
T OFFSET: 480 MS
T4 FREE SERPL-MCNC: 0.72 NG/DL (ref 0.61–1.12)
TRICUSPID ANNULAR PLANE SYSTOLIC EXCURSION: 3.3 CM
TSH SERPL-ACNC: 0.27 MIU/L (ref 0.44–3.98)
VENTRICULAR RATE: 65 BPM
WBC # BLD AUTO: 5.5 X10*3/UL (ref 4.4–11.3)

## 2025-05-21 PROCEDURE — 85027 COMPLETE CBC AUTOMATED: CPT | Performed by: PHYSICIAN ASSISTANT

## 2025-05-21 PROCEDURE — 82746 ASSAY OF FOLIC ACID SERUM: CPT | Mod: PARLAB

## 2025-05-21 PROCEDURE — 2500000001 HC RX 250 WO HCPCS SELF ADMINISTERED DRUGS (ALT 637 FOR MEDICARE OP): Performed by: PHYSICIAN ASSISTANT

## 2025-05-21 PROCEDURE — 70496 CT ANGIOGRAPHY HEAD: CPT

## 2025-05-21 PROCEDURE — 70498 CT ANGIOGRAPHY NECK: CPT | Performed by: STUDENT IN AN ORGANIZED HEALTH CARE EDUCATION/TRAINING PROGRAM

## 2025-05-21 PROCEDURE — 36415 COLL VENOUS BLD VENIPUNCTURE: CPT | Performed by: PHYSICIAN ASSISTANT

## 2025-05-21 PROCEDURE — 84439 ASSAY OF FREE THYROXINE: CPT

## 2025-05-21 PROCEDURE — 36415 COLL VENOUS BLD VENIPUNCTURE: CPT

## 2025-05-21 PROCEDURE — 70498 CT ANGIOGRAPHY NECK: CPT

## 2025-05-21 PROCEDURE — 83921 ORGANIC ACID SINGLE QUANT: CPT

## 2025-05-21 PROCEDURE — 97161 PT EVAL LOW COMPLEX 20 MIN: CPT | Mod: GP

## 2025-05-21 PROCEDURE — 2550000001 HC RX 255 CONTRASTS: Performed by: INTERNAL MEDICINE

## 2025-05-21 PROCEDURE — 2500000004 HC RX 250 GENERAL PHARMACY W/ HCPCS (ALT 636 FOR OP/ED): Mod: JW | Performed by: PHYSICIAN ASSISTANT

## 2025-05-21 PROCEDURE — G0378 HOSPITAL OBSERVATION PER HR: HCPCS

## 2025-05-21 PROCEDURE — 82306 VITAMIN D 25 HYDROXY: CPT | Mod: PARLAB

## 2025-05-21 PROCEDURE — 80048 BASIC METABOLIC PNL TOTAL CA: CPT | Performed by: PHYSICIAN ASSISTANT

## 2025-05-21 PROCEDURE — 2500000002 HC RX 250 W HCPCS SELF ADMINISTERED DRUGS (ALT 637 FOR MEDICARE OP, ALT 636 FOR OP/ED): Performed by: PHYSICIAN ASSISTANT

## 2025-05-21 PROCEDURE — 82607 VITAMIN B-12: CPT | Mod: PARLAB

## 2025-05-21 PROCEDURE — 70496 CT ANGIOGRAPHY HEAD: CPT | Performed by: STUDENT IN AN ORGANIZED HEALTH CARE EDUCATION/TRAINING PROGRAM

## 2025-05-21 PROCEDURE — 93306 TTE W/DOPPLER COMPLETE: CPT | Performed by: STUDENT IN AN ORGANIZED HEALTH CARE EDUCATION/TRAINING PROGRAM

## 2025-05-21 PROCEDURE — 99223 1ST HOSP IP/OBS HIGH 75: CPT | Performed by: PSYCHIATRY & NEUROLOGY

## 2025-05-21 PROCEDURE — 97166 OT EVAL MOD COMPLEX 45 MIN: CPT | Mod: GO

## 2025-05-21 PROCEDURE — C8929 TTE W OR WO FOL WCON,DOPPLER: HCPCS

## 2025-05-21 PROCEDURE — 84443 ASSAY THYROID STIM HORMONE: CPT

## 2025-05-21 RX ADMIN — ASPIRIN 81 MG CHEWABLE TABLET 81 MG: 81 TABLET CHEWABLE at 08:40

## 2025-05-21 RX ADMIN — IOHEXOL 75 ML: 350 INJECTION, SOLUTION INTRAVENOUS at 19:38

## 2025-05-21 RX ADMIN — OXCARBAZEPINE 1200 MG: 150 TABLET, FILM COATED ORAL at 08:37

## 2025-05-21 RX ADMIN — SERTRALINE HYDROCHLORIDE 50 MG: 50 TABLET ORAL at 08:39

## 2025-05-21 RX ADMIN — SERTRALINE HYDROCHLORIDE 50 MG: 50 TABLET ORAL at 18:30

## 2025-05-21 RX ADMIN — LISINOPRIL 5 MG: 5 TABLET ORAL at 08:40

## 2025-05-21 RX ADMIN — ALPRAZOLAM 0.25 MG: 0.25 TABLET ORAL at 18:30

## 2025-05-21 RX ADMIN — PHENOBARBITAL 64.8 MG: 32.4 TABLET ORAL at 08:40

## 2025-05-21 RX ADMIN — EXTENDED PHENYTOIN SODIUM 200 MG: 100 CAPSULE, EXTENDED RELEASE ORAL at 08:40

## 2025-05-21 RX ADMIN — METOPROLOL TARTRATE 100 MG: 100 TABLET, FILM COATED ORAL at 08:40

## 2025-05-21 RX ADMIN — PERFLUTREN 3 ML OF DILUTION: 6.52 INJECTION, SUSPENSION INTRAVENOUS at 10:03

## 2025-05-21 RX ADMIN — Medication 50 MCG: at 08:39

## 2025-05-21 RX ADMIN — DONEPEZIL HYDROCHLORIDE 10 MG: 10 TABLET ORAL at 08:39

## 2025-05-21 RX ADMIN — RIVAROXABAN 20 MG: 20 TABLET, FILM COATED ORAL at 18:30

## 2025-05-21 RX ADMIN — EXTENDED PHENYTOIN SODIUM 200 MG: 100 CAPSULE, EXTENDED RELEASE ORAL at 18:31

## 2025-05-21 RX ADMIN — METOPROLOL TARTRATE 100 MG: 100 TABLET, FILM COATED ORAL at 18:30

## 2025-05-21 RX ADMIN — OXCARBAZEPINE 900 MG: 150 TABLET, FILM COATED ORAL at 18:29

## 2025-05-21 RX ADMIN — PHENOBARBITAL 64.8 MG: 32.4 TABLET ORAL at 18:30

## 2025-05-21 RX ADMIN — ATORVASTATIN CALCIUM 40 MG: 40 TABLET, FILM COATED ORAL at 18:31

## 2025-05-21 ASSESSMENT — COGNITIVE AND FUNCTIONAL STATUS - GENERAL
DAILY ACTIVITIY SCORE: 19
DRESSING REGULAR LOWER BODY CLOTHING: TOTAL
DRESSING REGULAR LOWER BODY CLOTHING: A LOT
DRESSING REGULAR UPPER BODY CLOTHING: A LITTLE
STANDING UP FROM CHAIR USING ARMS: A LOT
DAILY ACTIVITIY SCORE: 14
MOBILITY SCORE: 11
CLIMB 3 TO 5 STEPS WITH RAILING: TOTAL
MOVING TO AND FROM BED TO CHAIR: A LOT
TOILETING: TOTAL
HELP NEEDED FOR BATHING: A LITTLE
TOILETING: A LITTLE
HELP NEEDED FOR BATHING: A LOT
DRESSING REGULAR UPPER BODY CLOTHING: A LITTLE
MOVING FROM LYING ON BACK TO SITTING ON SIDE OF FLAT BED WITH BEDRAILS: A LITTLE
PERSONAL GROOMING: A LITTLE
TURNING FROM BACK TO SIDE WHILE IN FLAT BAD: A LOT
WALKING IN HOSPITAL ROOM: TOTAL

## 2025-05-21 ASSESSMENT — PAIN SCALES - WONG BAKER: WONGBAKER_NUMERICALRESPONSE: NO HURT

## 2025-05-21 ASSESSMENT — PAIN SCALES - GENERAL
PAINLEVEL_OUTOF10: 0 - NO PAIN

## 2025-05-21 ASSESSMENT — PAIN - FUNCTIONAL ASSESSMENT
PAIN_FUNCTIONAL_ASSESSMENT: 0-10
PAIN_FUNCTIONAL_ASSESSMENT: 0-10

## 2025-05-21 NOTE — CONSULTS
Inpatient consult to Neurology  Consult performed by: Ezequiel Marquez MD  Consult ordered by: Dipesh Lopez MD          History Of Present Illness  Jose Luis Dejesus is a 75 y.o. male presenting with dizziness.  The patient states that over the last year he has had progressive dizziness.  He states that it is worse when he is lying or sitting and stands up quickly.  He feels that the room was spinning.  He also notes that when he has this he gets very weak in his legs and will go to the ground.  The patient does not feel that he is lost consciousness never.  The patient feels that the dizziness has been worsening.  He also feels that he is very weak in his lower extremities.  The patient states that he woke up on the morning of admission and walked to the bathroom and back with no issues.  He sat down at his computer in his room for bed and when he stood up he noted the sudden onset of dizziness.  He lowered himself to the ground and did not hit his head.  He states that he did land on his side and reports some mild left-sided back pain.  The patient states that he has had 3-4 falls in the last month secondary to dizziness.  The patient is followed by Dr. Morin in neurology.  The patient was admitted to the ER and had a CT scan of the brain that showed no acute process as well as CT scan of the cervical, thoracic and lumbar spines that showed no acute fracture or traumatic malalignment.  The patient did have an 8 mm right upper lobe pulmonary nodule.  The patient does have a history of seizures and trigeminal neuralgia and is currently on phenobarbital, Dilantin and Trileptal.  He is compliant with his aspirin, atorvastatin and Xarelto.  The patient also had some cognitive decline and is on Aricept.    Past Medical History  Medical History[1]  Surgical History  Surgical History[2]  Social History  Social History[3]  Allergies  Patient has no known allergies.  Prescriptions Prior to Admission[4]    Review of Systems    All other systems reviewed and are negative.      Neurological Exam  Physical Exam  Last Recorded Vitals  Blood pressure 130/62, pulse 61, temperature 35.5 °C (95.9 °F), resp. rate 18, height 1.829 m (6'), weight 125 kg (275 lb), SpO2 97%.    The patient is a well developed, [moderately obese] [male] in no acute distress.    The patient's funduscopic examination shows no papilledema bilaterally.    The patient's extremity examination shows that the pulses are 2+ in the upper and lower extremities bilaterally and there is 1+ edema in the lower extremities bilaterally.    The patient's mental status testing is alert and oriented ×3 with no evidence of aphasia or dysarthria.  The patient's fund of knowledge and concentration were within normal limits.  The patient is able to have a 2 out of 3 objects at 5 minutes.  The patient's cranial nerves 2, 3, 4, 5, 6, 7, 8, 9, 10, 11 and 12 are all within normal limits.  The patient's motor testing shows normal tone and bulk with 5-/5 strength in the upper extremities bilaterally and 4+ to 5-/5 strength in the proximal lower extremities.  The patient has 5-/ 5 strength in the distal lower extremities bilaterally.  The patient's sensory testing is intact to light touch in the upper and lower extremities bilaterally.  The patient's cerebellar testing is intact in the upper and lower extremities bilaterally.  The patient's station and gait were not tested as the patient is a high fall risk.  The patient's reflexes are 1+ in the upper and lower extremities and symmetrical.  Relevant Results  Scheduled medications  Scheduled Medications[5]  Continuous medications  Continuous Medications[6]  PRN medications  PRN Medications[7]    Results for orders placed or performed during the hospital encounter of 05/20/25 (from the past 96 hours)   Phenytoin level, total (dilantin)   Result Value Ref Range    Phenytoin 13.4 10.0 - 20.0 ug/mL   Phenobarbital level   Result Value Ref Range     Phenobarbital  22.0 10.0 - 40.0 ug/mL   CBC and Auto Differential   Result Value Ref Range    WBC 5.0 4.4 - 11.3 x10*3/uL    nRBC 0.0 0.0 - 0.0 /100 WBCs    RBC 3.61 (L) 4.50 - 5.90 x10*6/uL    Hemoglobin 12.5 (L) 13.5 - 17.5 g/dL    Hematocrit 38.4 (L) 41.0 - 52.0 %     (H) 80 - 100 fL    MCH 34.6 (H) 26.0 - 34.0 pg    MCHC 32.6 32.0 - 36.0 g/dL    RDW 13.8 11.5 - 14.5 %    Platelets 199 150 - 450 x10*3/uL    Neutrophils % 65.2 40.0 - 80.0 %    Immature Granulocytes %, Automated 0.2 0.0 - 0.9 %    Lymphocytes % 18.8 13.0 - 44.0 %    Monocytes % 7.7 2.0 - 10.0 %    Eosinophils % 7.1 0.0 - 6.0 %    Basophils % 1.0 0.0 - 2.0 %    Neutrophils Absolute 3.24 1.60 - 5.50 x10*3/uL    Immature Granulocytes Absolute, Automated 0.01 0.00 - 0.50 x10*3/uL    Lymphocytes Absolute 0.93 0.80 - 3.00 x10*3/uL    Monocytes Absolute 0.38 0.05 - 0.80 x10*3/uL    Eosinophils Absolute 0.35 0.00 - 0.40 x10*3/uL    Basophils Absolute 0.05 0.00 - 0.10 x10*3/uL   Magnesium   Result Value Ref Range    Magnesium 1.79 1.60 - 2.40 mg/dL   Comprehensive metabolic panel   Result Value Ref Range    Glucose 93 74 - 99 mg/dL    Sodium 141 136 - 145 mmol/L    Potassium 4.0 3.5 - 5.3 mmol/L    Chloride 107 98 - 107 mmol/L    Bicarbonate 28 21 - 32 mmol/L    Anion Gap 10 10 - 20 mmol/L    Urea Nitrogen 20 6 - 23 mg/dL    Creatinine 0.85 0.50 - 1.30 mg/dL    eGFR 90 >60 mL/min/1.73m*2    Calcium 9.1 8.6 - 10.3 mg/dL    Albumin 4.0 3.4 - 5.0 g/dL    Alkaline Phosphatase 51 33 - 136 U/L    Total Protein 6.7 6.4 - 8.2 g/dL    AST 21 9 - 39 U/L    Bilirubin, Total 0.5 0.0 - 1.2 mg/dL    ALT 18 10 - 52 U/L   Troponin I, High Sensitivity, Initial   Result Value Ref Range    Troponin I, High Sensitivity 4 0 - 20 ng/L   Urinalysis with Reflex Culture and Microscopic   Result Value Ref Range    Color, Urine Yellow Light-Yellow, Yellow, Dark-Yellow    Appearance, Urine Clear Clear    Specific Gravity, Urine 1.030 1.005 - 1.035    pH, Urine 5.5 5.0, 5.5,  6.0, 6.5, 7.0, 7.5, 8.0    Protein, Urine 10 (TRACE) NEGATIVE, 10 (TRACE), 20 (TRACE) mg/dL    Glucose, Urine Normal Normal mg/dL    Blood, Urine NEGATIVE NEGATIVE mg/dL    Ketones, Urine NEGATIVE NEGATIVE mg/dL    Bilirubin, Urine NEGATIVE NEGATIVE mg/dL    Urobilinogen, Urine 2 (1+) (A) Normal mg/dL    Nitrite, Urine NEGATIVE NEGATIVE    Leukocyte Esterase, Urine NEGATIVE NEGATIVE   Extra Urine Gray Tube   Result Value Ref Range    Extra Tube Hold for add-ons.    Urinalysis Microscopic   Result Value Ref Range    WBC, Urine NONE 1-5, NONE /HPF    RBC, Urine 1-2 NONE, 1-2, 3-5 /HPF    Mucus, Urine FEW Reference range not established. /LPF    Calcium Oxalate Crystals, Urine 1+ NONE, 1+ /HPF   Troponin, High Sensitivity, 1 Hour   Result Value Ref Range    Troponin I, High Sensitivity 4 0 - 20 ng/L   TSH with reflex to Free T4 if abnormal   Result Value Ref Range    Thyroid Stimulating Hormone 0.24 (L) 0.44 - 3.98 mIU/L   Thyroxine, Free   Result Value Ref Range    Thyroxine, Free 0.79 0.61 - 1.12 ng/dL   ECG 12 Lead   Result Value Ref Range    Ventricular Rate 65 BPM    Atrial Rate 0 BPM    MI Interval 71 ms    QRS Duration 111 ms    QT Interval 456 ms    QTC Calculation(Bazett) 475 ms    P Axis 0 degrees    R Axis 29 degrees    T Axis 16 degrees    QRS Count 10 beats    Q Onset 252 ms    T Offset 480 ms    QTC Fredericia 468 ms   Vascular US Carotid Artery Duplex Bilateral   Result Value Ref Range    BSA 2.52 m2   CBC   Result Value Ref Range    WBC 5.5 4.4 - 11.3 x10*3/uL    nRBC 0.0 0.0 - 0.0 /100 WBCs    RBC 2.97 (L) 4.50 - 5.90 x10*6/uL    Hemoglobin 11.4 (L) 13.5 - 17.5 g/dL    Hematocrit 32.0 (L) 41.0 - 52.0 %     (H) 80 - 100 fL    MCH 38.4 (H) 26.0 - 34.0 pg    MCHC 35.6 32.0 - 36.0 g/dL    RDW 15.1 (H) 11.5 - 14.5 %    Platelets 187 150 - 450 x10*3/uL   Basic metabolic panel   Result Value Ref Range    Glucose 87 74 - 99 mg/dL    Sodium 141 136 - 145 mmol/L    Potassium 3.7 3.5 - 5.3 mmol/L     Chloride 108 (H) 98 - 107 mmol/L    Bicarbonate 25 21 - 32 mmol/L    Anion Gap 12 10 - 20 mmol/L    Urea Nitrogen 19 6 - 23 mg/dL    Creatinine 0.72 0.50 - 1.30 mg/dL    eGFR >90 >60 mL/min/1.73m*2    Calcium 8.7 8.6 - 10.3 mg/dL   Transthoracic Echo Complete   Result Value Ref Range    BSA 2.52 m2            I have personally reviewed the following imaging results:   Imaging  CT abdomen pelvis wo IV contrast  Result Date: 5/20/2025  1. No acute findings in the abdomen and pelvis. 2. 3 mm right upper pole nonobstructing calculus, without hydronephrosis. 3. Hepatomegaly and diffuse steatosis.   Signed by: Xavier Perez 5/20/2025 10:08 AM Dictation workstation:   [id number]    CT cervical spine wo IV contrast  Result Date: 5/20/2025  Brain: No acute intracranial hemorrhage, mass effect, or CT apparent acute infarct. Chronic microvascular ischemia and involutional changes.   Cervical spine: No acute fracture or traumatic malalignment. Multilevel degenerative changes of the cervical spine, most prominent at C5-C6 and C6-C7.   Thoracic spine: No acute fracture or traumatic malalignment. No significant degenerative changes of the thoracic spine. 8 mm right upper lobe pulmonary nodule.   Lumbar spine: No acute fracture or traumatic malalignment. Multilevel degenerative changes of the lumbar spine, most prominent at L4-L5 and L5-S1.   A solid non-calcified pulmonary nodule measuring 6-8 mm in the right upper lobe.Consider follow up non contrast chest CT at 6-12 months, then consider CT chest at 18-24 months (Braxton Cavanaugh et al., Guidelines for management of incidental pulmonary nodules detected on CT images: From the Fleischner Society 2017, Radiology. 2017 Jul;284 (1):228-243.) [email or web address]     Signed by: Xavier Perez 5/20/2025 10:04 AM Dictation workstation:   [id number]    CT head wo IV contrast  Result Date: 5/20/2025  Brain: No acute intracranial hemorrhage, mass effect, or CT apparent acute  infarct. Chronic microvascular ischemia and involutional changes.   Cervical spine: No acute fracture or traumatic malalignment. Multilevel degenerative changes of the cervical spine, most prominent at C5-C6 and C6-C7.   Thoracic spine: No acute fracture or traumatic malalignment. No significant degenerative changes of the thoracic spine. 8 mm right upper lobe pulmonary nodule.   Lumbar spine: No acute fracture or traumatic malalignment. Multilevel degenerative changes of the lumbar spine, most prominent at L4-L5 and L5-S1.   A solid non-calcified pulmonary nodule measuring 6-8 mm in the right upper lobe.Consider follow up non contrast chest CT at 6-12 months, then consider CT chest at 18-24 months (Braxton Cavanaugh et al., Guidelines for management of incidental pulmonary nodules detected on CT images: From the Fleischner Society 2017, Radiology. 2017 Jul;284 (1):228-243.) [email or web address]     Signed by: Xavier Perez 5/20/2025 10:04 AM Dictation workstation:   [id number]    CT lumbar spine retrospective reconstruction protocol  Result Date: 5/20/2025  Brain: No acute intracranial hemorrhage, mass effect, or CT apparent acute infarct. Chronic microvascular ischemia and involutional changes.   Cervical spine: No acute fracture or traumatic malalignment. Multilevel degenerative changes of the cervical spine, most prominent at C5-C6 and C6-C7.   Thoracic spine: No acute fracture or traumatic malalignment. No significant degenerative changes of the thoracic spine. 8 mm right upper lobe pulmonary nodule.   Lumbar spine: No acute fracture or traumatic malalignment. Multilevel degenerative changes of the lumbar spine, most prominent at L4-L5 and L5-S1.   A solid non-calcified pulmonary nodule measuring 6-8 mm in the right upper lobe.Consider follow up non contrast chest CT at 6-12 months, then consider CT chest at 18-24 months (Braxton Cavanaugh et al., Guidelines for management of incidental pulmonary nodules detected  on CT images: From the Fleischner Society 2017, Radiology. 2017 Jul;284 (1):228-243.) [email or web address]     Signed by: Xavier Perez 5/20/2025 10:04 AM Dictation workstation:   [id number]    CT thoracic spine wo IV contrast  Result Date: 5/20/2025  Brain: No acute intracranial hemorrhage, mass effect, or CT apparent acute infarct. Chronic microvascular ischemia and involutional changes.   Cervical spine: No acute fracture or traumatic malalignment. Multilevel degenerative changes of the cervical spine, most prominent at C5-C6 and C6-C7.   Thoracic spine: No acute fracture or traumatic malalignment. No significant degenerative changes of the thoracic spine. 8 mm right upper lobe pulmonary nodule.   Lumbar spine: No acute fracture or traumatic malalignment. Multilevel degenerative changes of the lumbar spine, most prominent at L4-L5 and L5-S1.   A solid non-calcified pulmonary nodule measuring 6-8 mm in the right upper lobe.Consider follow up non contrast chest CT at 6-12 months, then consider CT chest at 18-24 months (Braxton Cavanaugh et al., Guidelines for management of incidental pulmonary nodules detected on CT images: From the Fleischner Society 2017, Radiology. 2017 Jul;284 (1):228-243.) [email or web address]     Signed by: Xavier Perez 5/20/2025 10:04 AM Dictation workstation:   [id number]      Cardiology, Vascular, and Other Imaging  ECG 12 Lead  Result Date: 5/21/2025  Atrial-paced complexes Borderline T abnormalities, diffuse leads See ED provider note for full interpretation and clinical correlation Confirmed by Luz Miranda ([id number]) on 5/21/2025 2:38:33 PM    Vascular US Carotid Artery Duplex Bilateral  Result Date: 5/20/2025  Preliminary Cardiology Report          [place] [address] Tel [phone] and Fax [phone]      [facility] Lab Report  [facility]  Patient Name:      HUNTER ZEE Alston Physician:   [id number] Jacey Sorto MD, RPVI Study Date:        5/20/2025    Ordering Physician: [id number] GIANCARLO PASCUAL MRN/PID:           [id number]     Technologist:       Mira Heredia RVT Accession#:        [id number] Technologist 2: Date of Birth/Age: 1950    Encounter#:         [id number] Gender:            M Admission Status:  Emergency    Location Performed: [facility]  Diagnosis/ICD: Dizziness and giddiness-R42 Procedure/CPT: 97679 Cerebrovascular Carotid Duplex scan complete  PRELIMINARY CONCLUSIONS: Right Carotid: Findings are consistent with less than 50% stenosis of the right proximal internal carotid artery. Laminar flow seen by color Doppler. Right external carotid artery appears patent with no evidence of stenosis. No evidence of hemodynamically significant stenosis of the right common carotid artery. The right vertebral artery is patent with antegrade flow. No evidence of hemodynamically significant stenosis in the right subclavian artery. Left Carotid: Findings are consistent with less than 50% stenosis of the left proximal internal carotid artery. Laminar flow seen by color Doppler. Left external carotid artery appears patent with no evidence of stenosis. No evidence of hemodynamically significant stenosis of the left common carotid artery. The left vertebral artery is patent with antegrade flow. No evidence of hemodynamically significant stenosis in the left subclavian artery.  Imaging & Doppler Findings: Right Plaque Morph: The proximal right internal carotid artery demonstrates heterogenous plaque. The proximal right external carotid artery demonstrates heterogenous plaque. The distal right common carotid artery demonstrates heterogenous plaque. Left Plaque Morph: The proximal left internal carotid artery demonstrates heterogenous plaque. The distal left common carotid artery demonstrates heterogenous plaque.   Right                        Left   PSV      EDV                PSV      EDV 89 cm/s             CCA P    97 cm/s 68 cm/s            CCA D    81 cm/s 68 cm/s  16 cm/s   ICA P    84 cm/s  20 cm/s 79 cm/s  21 cm/s   ICA M    74 cm/s  17 cm/s 83 cm/s  18 cm/s   ICA D    88 cm/s  24 cm/s 132 cm/s            ECA     101 cm/s 49 cm/s  11 cm/s Vertebral  69 cm/s  17 cm/s 206 cm/s         Subclavian 185 cm/s                Right Left ICA/CCA Ratio  1.0  1.0   VASCULAR PRELIMINARY REPORT completed by Mira Heredia RVSHYANNE on 5/20/2025 at 2:18:50 PM  ** Final **     XR pelvis 1-2 views  Result Date: 5/15/2025  EXAMINATION: XR PELVIS SINGLE VIEW 05/15/2025 09:30 AM CLINICAL HISTORY: s/p FFS ASSOCIATED DIAGNOSIS: ORDERING PROVIDER: MALKA MORA NOTE: COMPARISON: None FINDINGS: Limited evaluation due to poor penetration. Status post bilateral total hip replacement. No periprosthetic lucency or fracture of prosthesis. The pelvic ring is intact.  No acute fracture or dislocation is identified. There is no radiopaque foreign body. Lower lumbar spine spondylosis is present   IMPRESSION: No acute fracture or dislocation. MACRO: None I have personally reviewed the images and agree with the resident's interpretation.    XR chest 1 view  Result Date: 5/15/2025  EXAMINATION: XR CHEST AP OR PA 1 VIEW 05/15/2025 09:30 AM CLINICAL HISTORY: s/p FFS ASSOCIATED DIAGNOSIS: s/p FFS ORDERING PROVIDER: MALKA MORA NOTE: COMPARISON: XR CHEST AP OR PA 1 VIEW 6/22/2015 11:43 AM FINDINGS: Lines, tubes, and devices: A dual lead cardiac device is present with lead tips overlying the right atrial appendage and right ventricle Lungs and pleura: No focal pulmonary consolidation, effusion or pneumothorax. Stable right upper lobe granuloma. Cardiomediastinal silhouette: The cardiomediastinal silhouette is prominent in size and likely exaggerated by AP technique. Atherosclerotic calcifications are present in the thoracic aorta. Musculoskeletal: Degenerative spurring within the thoracic spine.  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 IMPRESSION: No acute cardiopulmonary abnormality identified. MACRO: None I have personally reviewed the images and agree with the resident's interpretation.    CT cervical spine wo IV contrast  Result Date: 5/15/2025  EXAMINATION: CT C-SPINE W/O CONTRAST 05/15/2025 09:21 AM CLINICAL HISTORY: FFS + Xarelto + head strike ASSOCIATED DIAGNOSIS: FFS + Xarelto + head strike ORDERING PROVIDER: MALKA MCKINNEY TECHNOLOGISTS NOTE: COMPARISON: Cervical spine CT from 10/7/2020 TECHNIQUE: Thin isotropic axial images were obtained from the skull base to the upper thoracic spine without intravenous contrast. 2D sagittal and coronal reconstructions were obtained from the axial data. FINDINGS: Counting reference: Craniocervical junction. Anatomic variants: None. Vertebrae: No acute fracture or traumatic malalignment. No aggressive osseous lesions. Mild loss of usual cervical lordosis. Vertebral body heights are maintained. Multilevel degenerative disc space narrowing with predominantly anterior endplate spurring, greatest at C5-6. 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 Multilevel facet hypertrophy and uncovertebral spurring with varying degrees of bony neural foraminal stenosis, severe on the right at C3-4 and C4-5 Soft Tissues: No dorsal paraspinous, paravertebral, or prevertebral fluid collection. Atheromatous calcifications of the carotid bifurcations. No mass or focal consolidation in the included upper lungs. IMPRESSION: No acute cervical spine fracture or traumatic malalignment. 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 MACRO: None    CT head wo IV contrast  Result Date: 5/15/2025  EXAMINATION: CT HEAD W/O CONTRAST 05/15/2025 09:21 AM CLINICAL HISTORY: FFS + Xarelto + head strike ASSOCIATED  DIAGNOSIS: FFS + Xarelto + head strike ORDERING PROVIDER: MALKA MCKINNEY TECHNOLOGISTS NOTE: COMPARISON: Head CT from 2/20/2021 TECHNIQUE: Thin axial imaging of the head was performed without intravenous contrast. FINDINGS: No significant compression of the underlying parenchyma. No acute intraparenchymal hemorrhage or CT evidence of acute territorial infarction. Unchanged thin low-density subdural fluid collections over the frontal convexities, measuring up to 7 mm on the left. Ventricular caliber is commensurate with the mild degree of generalized brain parenchymal volume loss. Scattered hypodensities in the periventricular white matter are nonspecific although favor sequelae of mild chronic small vessel ischemia. Atheromatous calcifications of the carotid siphons. No focal abnormality of the skull base or calvarium. Linear metallic foreign body traverses the ventral left maxillary alveolus. The paranasal sinuses and tympanomastoid cavities are unopacified. IMPRESSION: Mild volume loss and unchanged thin low-density subdural fluid collections over the frontal convexities without significant mass effect. MACRO: None       Assessment/Plan   Assessment & Plan  Dizziness    Multiple falls    Difficulty walking    Lewy body dementia (Multi)      Impression: The patient is a 75-year-old male with worsened dizziness and lower extremity weakness.  The patient is had multiple falls.  His neurological examination is mildly abnormal and noted above.  The differential diagnosis for his dizziness includes orthostatic hypotension, dehydration, medication effect, vertebrobasilar insufficiency and cardiac arrhythmia.  The differential diagnosis for his lower extremity weakness includes polyneuropathy, lumbar stenosis, cervical stenosis and general debilitation.    Plan: The patient needs an MRI of the brain without, cervical and lumbar spines as an outpatient as the patient has a pacemaker.  The patient will also need an EMG  nerve conduction study of the left upper and left lower extremity as an outpatient  The patient does need a CT angiogram of the neck and brain.  We will check a dementia workup.   The patient needs an echocardiogram and an orthostatic blood pressure and heart rate check.  The patient needs to continue stroke risk factor modification.   The patient is currently on anticoagulation but is a high fall risk.  The patient is clearly at increased risk for embolic events.  I would continue his anticoagulation at this time but clearly he is at an increased risk for trauma and hemorrhage while on anticoagulation.  The patient is currently in a difficult social situation as his wife is blind and he takes care of himself at home.  It appears as though he is having significant difficulty taking care of himself.  We will check the results of the oxcarbazepine level.  I would certainly continue all of his anticonvulsants and donepezil.  The patient needs a PT, OT, social service, rehab and speech therapy consult.  The patient needs DVT prophylaxis.  The patient needs neurochecks as per protocol.  I will send the note to Dr. Lopez  Thank you very much for sending me this very interesting consultation.  I discussed all these issues in detail with the patient and answered all their questions.  I will continue to follow the patient while they are in the hospital.  The patient needs follow-up with their primary care doctor within 2 weeks of discharge.  On discharge, the patient will follow up with Dr. Morin as scheduled in the office.        Ezequiel Marquez MD         [1] No past medical history on file.  [2]   Past Surgical History:  Procedure Laterality Date    HERNIA REPAIR  05/26/2016    Hernia Repair    OTHER SURGICAL HISTORY  05/26/2016    Cardiac Cath Procedure Outcome: Successful   [3]   Social History  Tobacco Use    Smoking status: Former     Types: Cigarettes     Start date: 1994     Quit date: 1968     Years since  quittin.4    Smokeless tobacco: Never   Substance Use Topics    Alcohol use: Not Currently    Drug use: Never   [4]   Medications Prior to Admission   Medication Sig Dispense Refill Last Dose/Taking    ALPRAZolam (Xanax) 0.25 mg tablet Take 1 tablet (0.25 mg) by mouth once daily in the morning.   2025 Morning    aspirin 81 mg chewable tablet Chew 1 tablet (81 mg) once daily.   2025 Morning    atorvastatin (Lipitor) 40 mg tablet Take 1 tablet (40 mg) by mouth once daily at bedtime.   2025 Bedtime    b complex vitamins capsule Take 1 capsule by mouth once daily.   2025 Morning    cholecalciferol (D3-2000) 50 mcg (2,000 units) capsule Take 1 capsule (50 mcg) by mouth once daily.   2025 Morning    donepezil (Aricept) 10 mg tablet Please take 10 mg/day (Patient taking differently: Take 1 tablet (10 mg) by mouth once daily in the morning.) 30 tablet 5 2025 Morning    lisinopril 5 mg tablet Take 1 tablet (5 mg) by mouth once daily in the morning.   2025 Morning    metoprolol tartrate (Lopressor) 100 mg tablet Take 1 tablet (100 mg) by mouth every 12 hours.   2025 Morning    OXcarbazepine (Trileptal) 600 mg tablet Take 2 tablets (1,200 mg) by mouth once daily in the morning.   2025 Morning    OXcarbazepine (Trileptal) 600 mg tablet Take 1.5 tablets (900 mg) by mouth once daily at bedtime.   2025 Bedtime    PHENobarbital (Luminal) 64.8 mg tablet Take 1 tablet (64.8 mg) by mouth 2 times a day. 180 tablet 2 2025 Morning    phenytoin ER (Dilantin) 100 mg capsule TAKE TWO CAPSULES BY MOUTH TWO TIMES A  capsule 0 2025 Morning    rivaroxaban (Xarelto) 20 mg tablet Take 1 tablet (20 mg) by mouth once daily in the evening. Take with meals.   2025 Evening    sertraline (Zoloft) 50 mg tablet Take 1 tablet (50 mg) by mouth 2 times a day.   2025 Morning    amiodarone (Pacerone) 200 mg tablet Take 1 tablet (200 mg) by mouth early in the morning.. (Patient  not taking: Reported on 11/19/2024)   Not Taking    topiramate (Topamax) 50 mg tablet Week 1 - 25 mg (0.5 tabs) in the morning.  Week 2 - 25 mg (0.5 tabs) twice a day.  Week 3 - 50 mg (1 tab) in the morning, 25 mg (0.5 tabs) in the evening.  Week 4 and beyond - 50 mg (1 tab) twice a day (Patient not taking: Reported on 5/20/2025) 60 tablet 5 Not Taking   [5] ALPRAZolam, 0.25 mg, oral, q AM  aspirin, 81 mg, oral, Daily  atorvastatin, 40 mg, oral, Nightly  cholecalciferol, 50 mcg, oral, Daily  donepezil, 10 mg, oral, q AM  lisinopril, 5 mg, oral, q AM  metoprolol tartrate, 100 mg, oral, q12h VALENTE  OXcarbazepine, 1,200 mg, oral, q AM  OXcarbazepine, 900 mg, oral, Nightly  PHENobarbital, 64.8 mg, oral, BID  phenytoin ER, 200 mg, oral, BID  psyllium, 1 packet, oral, Daily  rivaroxaban, 20 mg, oral, Daily with evening meal  sertraline, 50 mg, oral, BID  [6]    [7] PRN medications: acetaminophen **OR** acetaminophen **OR** acetaminophen, meclizine

## 2025-05-21 NOTE — PROGRESS NOTES
05/21/25 1553   Discharge Planning   Living Arrangements Spouse/significant other   Support Systems Spouse/significant other   Type of Residence Private residence   Home or Post Acute Services Post acute facilities (Rehab/SNF/etc)   Type of Post Acute Facility Services Skilled nursing   Expected Discharge Disposition SNF   Does the patient need discharge transport arranged? Yes   RoundTrip coordination needed? Yes   Patient Choice   Provider Choice list and CMS website (https://medicare.gov/care-compare#search) for post-acute Quality and Resource Measure Data were provided and reviewed with:   (Requested onsite TCC provide SNF list)   Stroke Family Assessment   Stroke Family Assessment Needed No   Intensity of Service   Intensity of Service 0-30 min     Unable to reach pt via phone x 2. TC to pt's wife Darian who was waiting for a ride in the hospital lobby. Brief conversation but did state that she is legally blind and no longer able to care for the pt at home due to his needs. She reports the pt has had 3 falls in the past two weeks. Therapy evals completed, ampac is 11 and recommendation is for moderate intensity. Wife Darian agrees. States she will be visiting again on Friday 5/23 and due to her limited eyesight is not able to see emails. This TCC updated the onsite TCC and requested a CarePort list be left in the room for her. Pt is obs and will need a precert once a FOC is determined.

## 2025-05-21 NOTE — PROGRESS NOTES
Jose Luis Dejesus is a 75 y.o. male on day 0 of admission presenting with Dizziness.      Subjective   No events overnight. Patient seen and examined at bedside. He is able to participate. He is calm and cooperative. He is eating and talking well. Wife is present in room. Discussed potential rehab with the patient and wife, who was agreeable. Plan is to obtain orthostatic vitals and monitor overnight.       Objective     Last Recorded Vitals  /59 (BP Location: Left arm, Patient Position: Standing)   Pulse 83   Temp 36.2 °C (97.2 °F) (Temporal)   Resp 18   Wt 125 kg (275 lb)   SpO2 97%   Intake/Output last 3 Shifts:  No intake or output data in the 24 hours ending 05/21/25 1435    Admission Weight  Weight: 125 kg (275 lb) (05/20/25 0748)    Daily Weight  05/20/25 : 125 kg (275 lb)    Image Results  ECG 12 Lead  Atrial-paced complexes  Borderline T abnormalities, diffuse leads      Physical Exam    General: in no acute distress  Eyes: PERRLA   HENT: Normo-cephalic, atraumatic.   CV: Regular rate, regular rhythm.   Resp: Breathing non-labored,  diminished to auscultation bilaterally  GI: BS x4   : monitor intake and output  MSK/Extremities: No gross bony deformities. PT/OT on the case  Skin: Warm. Appropriate color, continue offloading  Neuro:  Face symmetric.   Psych: returning to baseline, improved     10 point ROS negative unless otherwise noted in HPI    Patient fully evaluated 05/21  for    Problem List Items Addressed This Visit          Symptoms and Signs    * (Principal) Dizziness - Primary    Relevant Orders    Phenytoin level, total (dilantin) (Completed)    Oxcarbazepine level    Phenobarbital level (Completed)    Urinalysis with Reflex Culture and Microscopic (Completed)    CT abdomen pelvis wo IV contrast (Completed)    CT cervical spine wo IV contrast (Completed)    CBC and Auto Differential (Completed)    Magnesium (Completed)    Comprehensive metabolic panel (Completed)    CT head wo IV contrast  (Completed)    CT lumbar spine retrospective reconstruction protocol (Completed)    CT thoracic spine wo IV contrast (Completed)    Urinalysis Microscopic (Completed)    Vascular US Carotid Artery Duplex Bilateral (Completed)    Transthoracic Echo Complete    Difficulty walking    Relevant Orders    Phenytoin level, total (dilantin) (Completed)    Oxcarbazepine level    Phenobarbital level (Completed)    Urinalysis with Reflex Culture and Microscopic (Completed)    CT abdomen pelvis wo IV contrast (Completed)    CT cervical spine wo IV contrast (Completed)    CBC and Auto Differential (Completed)    Magnesium (Completed)    Comprehensive metabolic panel (Completed)    CT head wo IV contrast (Completed)    CT lumbar spine retrospective reconstruction protocol (Completed)    CT thoracic spine wo IV contrast (Completed)    Urinalysis Microscopic (Completed)    Vascular US Carotid Artery Duplex Bilateral (Completed)    Transthoracic Echo Complete     Other Visit Diagnoses         Drug-induced dizziness        Relevant Orders    Transthoracic Echo Complete          Patient seen resting in bed with head of bed elevated, no s/s or c/o acute difficulties at this time.  Vital signs for last 24 hours Temp:  [35.6 °C (96.1 °F)-36.8 °C (98.2 °F)] 36.2 °C (97.2 °F)  Heart Rate:  [56-83] 83  Resp:  [18] 18  BP: (101-151)/(54-69) 136/59    No intake/output data recorded.  Patient still requiring frequent cardiac and SPO2 monitoring. Continue aggressive pulmonary hygiene and oral hygiene. Off loading as tolerated for skin integrity. Medications and labs reviewed-   Results for orders placed or performed during the hospital encounter of 05/20/25 (from the past 24 hours)   Vascular US Carotid Artery Duplex Bilateral   Result Value Ref Range    BSA 2.52 m2   CBC   Result Value Ref Range    WBC 5.5 4.4 - 11.3 x10*3/uL    nRBC 0.0 0.0 - 0.0 /100 WBCs    RBC 2.97 (L) 4.50 - 5.90 x10*6/uL    Hemoglobin 11.4 (L) 13.5 - 17.5 g/dL     Hematocrit 32.0 (L) 41.0 - 52.0 %     (H) 80 - 100 fL    MCH 38.4 (H) 26.0 - 34.0 pg    MCHC 35.6 32.0 - 36.0 g/dL    RDW 15.1 (H) 11.5 - 14.5 %    Platelets 187 150 - 450 x10*3/uL   Basic metabolic panel   Result Value Ref Range    Glucose 87 74 - 99 mg/dL    Sodium 141 136 - 145 mmol/L    Potassium 3.7 3.5 - 5.3 mmol/L    Chloride 108 (H) 98 - 107 mmol/L    Bicarbonate 25 21 - 32 mmol/L    Anion Gap 12 10 - 20 mmol/L    Urea Nitrogen 19 6 - 23 mg/dL    Creatinine 0.72 0.50 - 1.30 mg/dL    eGFR >90 >60 mL/min/1.73m*2    Calcium 8.7 8.6 - 10.3 mg/dL   Transthoracic Echo Complete   Result Value Ref Range    BSA 2.52 m2      Patient recently received an antibiotic (last 12 hours)       None           Plan discussed with interdisciplinary team, continue current and repeat labs in the AM.       Discharge planning discussed with patient and care team. Therapy evaluations ordered.   [facility]-   14  Anticipate - SNF/[facility]    Patient aware and agreeable to current plan, continue plan as above.     I spent a total of 60 minutes on the date of the service which included preparing to see the patient, face-to-face patient care, completing clinical documentation, obtaining and/or reviewing separately obtained history, performing a medically appropriate examination, counseling and educating the patient/family/caregiver, ordering medications, tests, or procedures, communicating with other HCPs (not separately reported), independently interpreting results (not separately reported), communicating results to the patient/family/caregiver, and care coordination (not separately reported).     Relevant Results                              Assessment & Plan  Dizziness    Multiple falls    Difficulty walking    Lewy body dementia (Multi)    HTN (hypertension)    Pulmonary nodule               PAULA MULLEN

## 2025-05-21 NOTE — PROGRESS NOTES
Physical Therapy    Physical Therapy Evaluation    Patient Name: Jose Luis Dejesus  MRN: [id number]  Today's Date: 5/21/2025   Time Calculation  Start Time: 1111  Stop Time: 1133  Time Calculation (min): 22 min  604/604-A    Assessment/Plan   PT Assessment  PT Assessment Results: Decreased strength, Decreased range of motion, Decreased endurance, Impaired balance, Decreased safety awareness, Impaired judgement  Rehab Prognosis: Fair  Barriers to Discharge Home: Caregiver assistance  Caregiver Assistance: Caregiver assistance needed per identified barriers - however, level of patient's required assistance exceeds assistance available at home  Evaluation/Treatment Tolerance: Patient limited by fatigue  Strengths: Support of Caregivers  Barriers to Participation: Comorbidities  End of Session Communication: Bedside nurse  Assessment Comment: Pt admitted 5/20 with weakness and falls. Pt demos decreased safety, balance, strength and endurance. Pt unable to ambulate functional distance and is a high falls risk. Recommend moderate intensity therapy.  End of Session Patient Position: Bed, 2 rail up, Alarm off, not on at start of session  IP OR SWING BED PT PLAN  Inpatient or Swing Bed: Inpatient  PT Plan  Treatment/Interventions: Bed mobility, Transfer training, Gait training, Balance training, Strengthening, Endurance training, Range of motion, Therapeutic exercise, Therapeutic activity, Home exercise program, Positioning  PT Plan: Ongoing PT  PT Frequency: 3 times per week  PT Discharge Recommendations: Moderate intensity level of continued care  PT - OK to Discharge: Yes    Subjective     Current Problem:  1. Dizziness  Phenytoin level, total (dilantin)    Oxcarbazepine level    Phenobarbital level    Urinalysis with Reflex Culture and Microscopic    CT abdomen pelvis wo IV contrast    CT cervical spine wo IV contrast    Phenytoin level, total (dilantin)    Oxcarbazepine level    Phenobarbital level    Urinalysis with Reflex  Culture and Microscopic    CT abdomen pelvis wo IV contrast    CT cervical spine wo IV contrast    CBC and Auto Differential    Magnesium    Comprehensive metabolic panel    CBC and Auto Differential    Magnesium    Comprehensive metabolic panel    CT head wo IV contrast    CT head wo IV contrast    CT lumbar spine retrospective reconstruction protocol    CT lumbar spine retrospective reconstruction protocol    CT thoracic spine wo IV contrast    CT thoracic spine wo IV contrast    Urinalysis Microscopic    Urinalysis Microscopic    Vascular US Carotid Artery Duplex Bilateral    Transthoracic Echo Complete    Vascular US Carotid Artery Duplex Bilateral    Transthoracic Echo Complete      2. Difficulty walking  Phenytoin level, total (dilantin)    Oxcarbazepine level    Phenobarbital level    Urinalysis with Reflex Culture and Microscopic    CT abdomen pelvis wo IV contrast    CT cervical spine wo IV contrast    Phenytoin level, total (dilantin)    Oxcarbazepine level    Phenobarbital level    Urinalysis with Reflex Culture and Microscopic    CT abdomen pelvis wo IV contrast    CT cervical spine wo IV contrast    CBC and Auto Differential    Magnesium    Comprehensive metabolic panel    CBC and Auto Differential    Magnesium    Comprehensive metabolic panel    CT head wo IV contrast    CT head wo IV contrast    CT lumbar spine retrospective reconstruction protocol    CT lumbar spine retrospective reconstruction protocol    CT thoracic spine wo IV contrast    CT thoracic spine wo IV contrast    Urinalysis Microscopic    Urinalysis Microscopic    Vascular US Carotid Artery Duplex Bilateral    Transthoracic Echo Complete    Vascular US Carotid Artery Duplex Bilateral    Transthoracic Echo Complete      3. Drug-induced dizziness  Transthoracic Echo Complete    Transthoracic Echo Complete        Problem List[1]    General Visit Information:  General  Reason for Referral: PT eval and treat; impaired mobitliy  Referred By:  Chacorta Rose PA-C  Past Medical History Relevant to Rehab: Pt admitted 5/20 with increased weakness and multiple falls in the past few days. Imaging negative for acute fracture in spine, CT brain negative and CT chest found to have RUL pulmonary nodule. (PMH: afib, HTN, lewy body dementia, HTN, pacemaker, seizures)  Family/Caregiver Present: Yes  Caregiver Feedback: wife present and supportive  Co-Treatment: OT  Co-Treatment Reason: to maximize patient safety and participation  Prior to Session Communication: Bedside nurse  Patient Position Received: Bed, 2 rail up  General Comment: Pt pleasant and agreeable to therapy    Home Living:  Home Living  Home Living Comments: Pt lives with his wife in a house with 4 [address] with HR. Pt has one level home with tub/shower with chair, bars and no HHS. Elevated toilet with BSC frame. Sleeps in recliner.    Prior Level of Function:  Prior Function Per Pt/Caregiver Report  Level of [place]: Independent with ADLs and functional transfers  Hand Dominance: Right  Prior Function Comments: family/friends drive as patients wife is legally blind; spouse completes IADLs    Precautions:  Precautions  Medical Precautions: Fall precautions     Objective     Pain:  Pain Assessment  Pain Assessment: 0-10  0-10 (Numeric) Pain Score: 0 - No pain    Cognition:  Cognition  Overall Cognitive Status: Within Functional Limits (History of dementia, however fully oriented)  Orientation Level: Oriented X4    General Assessments:      Activity Tolerance  Endurance: Decreased tolerance for upright activites  Sensation  Light Touch: No apparent deficits  Strength  Strength Comments: demos at least 3/5 in BLEs     Functional Assessments:     Bed Mobility  Bed Mobility:  (completed supine <> sit with ModA x1 and increased time and effort. Cues for positioning hips at EOB. Reports ongoing dizziness.)  Transfers  Transfer:  (completed from EOB to no device, patient refusing FWW use and stood with  Mod Ax2 and used UE support on recliner for SPT with mod Ax2.)  Ambulation/Gait Training  Ambulation/Gait Training Performed:  (Pt unable to attempt due to fatigue and weakness)     Extremity/Trunk Assessments:        RLE   RLE : Within Functional Limits  LLE   LLE : Within Functional Limits    Outcome Measures:     [facility] Basic Mobility  Turning from your back to your side while in a flat bed without using bedrails: A little  Moving from lying on your back to sitting on the side of a flat bed without using bedrails: A lot  Moving to and from bed to chair (including a wheelchair): A lot  Standing up from a chair using your arms (e.g. wheelchair or bedside chair): A lot  To walk in hospital room: Total  Climbing 3-5 steps with railing: Total  Basic Mobility - Total Score: 11     Goals:  Encounter Problems       Encounter Problems (Active)       PT Problem       PT Goal 1 STG - Pt will transition supine <> sitting with Ervin  (Progressing)       Start:  05/21/25    Expected End:  06/04/25            PT Goal 2 STG - Pt will transfer STS with Ervin  (Progressing)       Start:  05/21/25    Expected End:  06/04/25            PT Goal 3 STG - Pt will amb 50' using FWW with Ervin  (Progressing)       Start:  05/21/25    Expected End:  06/04/25            PT Goal 4 STG - Pt will perform a B LE ther ex program of 2-3 sets of 10  (Progressing)       Start:  05/21/25    Expected End:  06/04/25               Pain - Adult            Education Documentation  Precautions, taught by Katlyn Byers PT at 5/21/2025  1:29 PM.  Learner: Family, Patient  Readiness: Acceptance  Method: Explanation  Response: Verbalizes Understanding, Needs Reinforcement    Body Mechanics, taught by Katlyn Byers PT at 5/21/2025  1:29 PM.  Learner: Family, Patient  Readiness: Acceptance  Method: Explanation  Response: Verbalizes Understanding, Needs Reinforcement    Mobility Training, taught by Katlyn Byers PT at 5/21/2025  1:29 PM.  Learner: Family,  Patient  Readiness: Acceptance  Method: Explanation  Response: Verbalizes Understanding, Needs Reinforcement    Education Comments  No comments found.              [1]   Patient Active Problem List  Diagnosis    Dizziness    Multiple falls    Difficulty walking    Lewy body dementia (Multi)    HTN (hypertension)    Pulmonary nodule

## 2025-05-21 NOTE — PROGRESS NOTES
Occupational Therapy    Evaluation    Patient Name: Jose Luis Dejesus  MRN: [id number]  Today's Date: 5/21/2025  Time Calculation  Start Time: 1110  Stop Time: 1133  Time Calculation (min): 23 min  604/604-A    Assessment  IP OT Assessment  OT Assessment: This hospitalization 5/20/2025: who presents to the ED from home for evaluation after a fall.  CT brain, cervical spine, thoracic and lumbar:  no acute fracture.  Patient would benefit from further OT to address ADL's and functional transfers/mobility due to high risk for further falls and significant decline in baseline function.  Prognosis: Good  End of Session Communication: Bedside nurse  End of Session Patient Position: Up in chair, Alarm off, not on at start of session; call-light within reach    Plan:  Treatment Interventions: ADL retraining, Functional transfer training, Patient/family training, Equipment evaluation/education, Compensatory technique education, Endurance training (energy conservation / diaphragmatic breathing techniques training)  OT Frequency: 3 times per week  OT Discharge Recommendations: Moderate intensity level of continued care  OT - OK to Discharge: Yes to next level of care when medically cleared by physician/medical team    Subjective   Current Problem:  1. Dizziness  Phenytoin level, total (dilantin)    Oxcarbazepine level    Phenobarbital level    Urinalysis with Reflex Culture and Microscopic    CT abdomen pelvis wo IV contrast    CT cervical spine wo IV contrast    Phenytoin level, total (dilantin)    Oxcarbazepine level    Phenobarbital level    Urinalysis with Reflex Culture and Microscopic    CT abdomen pelvis wo IV contrast    CT cervical spine wo IV contrast    CBC and Auto Differential    Magnesium    Comprehensive metabolic panel    CBC and Auto Differential    Magnesium    Comprehensive metabolic panel    CT head wo IV contrast    CT head wo IV contrast    CT lumbar spine retrospective reconstruction protocol    CT lumbar spine  retrospective reconstruction protocol    CT thoracic spine wo IV contrast    CT thoracic spine wo IV contrast    Urinalysis Microscopic    Urinalysis Microscopic    Vascular US Carotid Artery Duplex Bilateral    Transthoracic Echo Complete    Vascular US Carotid Artery Duplex Bilateral    Transthoracic Echo Complete      2. Difficulty walking  Phenytoin level, total (dilantin)    Oxcarbazepine level    Phenobarbital level    Urinalysis with Reflex Culture and Microscopic    CT abdomen pelvis wo IV contrast    CT cervical spine wo IV contrast    Phenytoin level, total (dilantin)    Oxcarbazepine level    Phenobarbital level    Urinalysis with Reflex Culture and Microscopic    CT abdomen pelvis wo IV contrast    CT cervical spine wo IV contrast    CBC and Auto Differential    Magnesium    Comprehensive metabolic panel    CBC and Auto Differential    Magnesium    Comprehensive metabolic panel    CT head wo IV contrast    CT head wo IV contrast    CT lumbar spine retrospective reconstruction protocol    CT lumbar spine retrospective reconstruction protocol    CT thoracic spine wo IV contrast    CT thoracic spine wo IV contrast    Urinalysis Microscopic    Urinalysis Microscopic    Vascular US Carotid Artery Duplex Bilateral    Transthoracic Echo Complete    Vascular US Carotid Artery Duplex Bilateral    Transthoracic Echo Complete      3. Drug-induced dizziness  Transthoracic Echo Complete    Transthoracic Echo Complete        General:  General  Reason for Referral: ADL, safety assessment  Referred By: Chacorta Rose PA-C  Past Medical History Relevant to Rehab: obesity, atrial fibrillation (on Xarelto) s/p ablation, tachybradycardia syndrome s/p PPM, hypertension, history of seizures, and Lewy body dementia  Co-Treatment: PT  Co-Treatment Reason: Co-eval to maximize safety during mobility  Prior to Session Communication: Bedside nurse who confirmed that patient is medically stable to participate in this OT  session  Patient Position Received: Bed, 4 rail up, Alarm off, not on at start of session  General Comment: Patient seen bedside; cooperative, motivated    Precautions:  Hearing/Visual Limitations: [place] left ear  Medical Precautions: Fall precautions  Precautions Comment: stated mild dizziness when sat EOB and remainder of session with mobility tasks    Vital Signs:  Vital Signs Comment: stable per PCA    Pain:  Pain Assessment  Pain Assessment: 0-10  0-10 (Numeric) Pain Score: 0 - No pain    Objective   Cognition:  Overall Cognitive Status: Within Functional Limits  Safety/Judgement:  (impaired)     Home Living:  Type of Home: House  Lives With: Spouse  Home Adaptive Equipment: Walker rolling or standard, Cane  Home Layout: One level  Home Access: Stairs to enter with rails (4)  Bathroom Shower/Tub: Tub/shower unit  Bathroom Toilet: Standard  Bathroom Equipment: Bedside commode, Grab bars in shower (shower seat)  Home Living Comments: sleeps in recliner chair to help with ease in breathing     Prior Function:  Level of [place]: Independent with ADLs and functional transfers  Homemaking Assistance:  (wife does all homemaking chores)  Ambulatory Assistance: Independent (cane then progressed to using wheeled walker)  Hand Dominance: Right  Prior Function Comments: family/friends drive; also relies on delivery services.  History of 3 falls within 2 weeks.    ADL:  LE Dressing Assistance: Total (estimate due to poor standing and generalized weakness)  ADL Comments: To further address self-care tasks in OT sessions using assistive techniques/adaptive equipment to promote indep. and ease in performance    Activity Tolerance:  Endurance: Decreased tolerance for upright activites    Bed Mobility/Transfers:  Instructed patient in use of walker to perform transfers more safely however patient refused.    Bed Mobility  Bed Mobility: Yes  Bed Mobility 1  Bed Mobility 1: Supine to sitting  Level of Assistance 1: Moderate  assistance  Bed Mobility Comments 1: HOB elevated  Transfers  Transfer: Yes  Transfer 1  Transfer From 1: Sit to, Stand to (then pivot/turn, few steps)  Transfer to 1: Bed, Chair with arms  Transfer Device 1: Gait belt (refused use of wheeled walker)  Transfer Level of Assistance 1: Moderate assistance, +2, Moderate verbal cues  Trials/Comments 1: held onto armrests of recliner chair to perform above mobility    Sitting Balance:  Static Sitting Balance  Static Sitting-Level of Assistance: Distant supervision    Sensation:  Light Touch: No apparent deficits    Extremities: RUE   RUE : Within Functional Limits (grossly observed during functional tasks) and LUE   LUE: Within Functional Limits (grossly observed during functional tasks)    Outcome Measures: [facility] Daily Activity  Putting on and taking off regular lower body clothing: Total  Bathing (including washing, rinsing, drying): A lot  Putting on and taking off regular upper body clothing: A little  Toileting, which includes using toilet, bedpan or urinal: Total  Taking care of personal grooming such as brushing teeth: A little  Eating Meals: None  Daily Activity - Total Score: 14     EDUCATION:  Education Documentation  Mobility Training, taught by Glynn Calzada OT at 5/21/2025 12:49 PM.  Learner: Patient  Readiness: Acceptance  Method: Explanation  Response: Needs Reinforcement    Goals:   Encounter Problems       Encounter Problems (Active)       OT Goals       Patient will complete upper and lower body bathing/dressing; toileting with supervision using assistive techniques/adaptive equipment as needed  (Progressing)       Start:  05/21/25    Expected End:  06/04/25            Patient will perform bed mobility and functional transfers safely with supervision:  bed, chair, commode using DME as needed  (Progressing)       Start:  05/21/25    Expected End:  06/04/25            Patient will apply energy conservation/diaphragmatic breathing techniques to ADL's  and functional transfers with minimal cues  (Progressing)       Start:  05/21/25    Expected End:  06/04/25            Patient will tolerate standing for 3 mins. in prep during ADL's and functional transfers/mobility  (Progressing)       Start:  05/21/25    Expected End:  06/04/25

## 2025-05-21 NOTE — CARE PLAN
The patient's goals for the shift include      The clinical goals for the shift include Safety, comfort and remain free from dizzines thoroughout shift      Problem: Safety - Adult  Goal: Free from fall injury  Outcome: Progressing  Flowsheets (Taken 5/21/2025 0425)  Free from fall injury: Instruct family/caregiver on patient safety     Problem: Fall/Injury  Goal: Be free from injury by end of the shift  Outcome: Progressing

## 2025-05-21 NOTE — CARE PLAN
The patient's goals for the shift include      The clinical goals for the shift include no falls throught shift    Over the shift, the patient did make progress toward the following goals.

## 2025-05-22 LAB
25(OH)D3 SERPL-MCNC: 41 NG/ML (ref 30–100)
ALBUMIN SERPL BCP-MCNC: 3.7 G/DL (ref 3.4–5)
ALP SERPL-CCNC: 53 U/L (ref 33–136)
ALT SERPL W P-5'-P-CCNC: 15 U/L (ref 10–52)
ANION GAP SERPL CALC-SCNC: 11 MMOL/L (ref 10–20)
AST SERPL W P-5'-P-CCNC: 20 U/L (ref 9–39)
BILIRUB SERPL-MCNC: 0.6 MG/DL (ref 0–1.2)
BUN SERPL-MCNC: 18 MG/DL (ref 6–23)
CALCIUM SERPL-MCNC: 8.8 MG/DL (ref 8.6–10.3)
CHLORIDE SERPL-SCNC: 108 MMOL/L (ref 98–107)
CO2 SERPL-SCNC: 27 MMOL/L (ref 21–32)
CREAT SERPL-MCNC: 0.76 MG/DL (ref 0.5–1.3)
EGFRCR SERPLBLD CKD-EPI 2021: >90 ML/MIN/1.73M*2
ERYTHROCYTE [DISTWIDTH] IN BLOOD BY AUTOMATED COUNT: 15.2 % (ref 11.5–14.5)
FOLATE SERPL-MCNC: 11.9 NG/ML
GLUCOSE SERPL-MCNC: 84 MG/DL (ref 74–99)
HCT VFR BLD AUTO: 34.5 % (ref 41–52)
HGB BLD-MCNC: 11.7 G/DL (ref 13.5–17.5)
MCH RBC QN AUTO: 36.9 PG (ref 26–34)
MCHC RBC AUTO-ENTMCNC: 33.9 G/DL (ref 32–36)
MCV RBC AUTO: 109 FL (ref 80–100)
NRBC BLD-RTO: 0 /100 WBCS (ref 0–0)
PLATELET # BLD AUTO: 199 X10*3/UL (ref 150–450)
POTASSIUM SERPL-SCNC: 3.5 MMOL/L (ref 3.5–5.3)
PROT SERPL-MCNC: 6 G/DL (ref 6.4–8.2)
RBC # BLD AUTO: 3.17 X10*6/UL (ref 4.5–5.9)
SODIUM SERPL-SCNC: 142 MMOL/L (ref 136–145)
VIT B12 SERPL-MCNC: 313 PG/ML (ref 211–911)
WBC # BLD AUTO: 5.3 X10*3/UL (ref 4.4–11.3)

## 2025-05-22 PROCEDURE — 2500000002 HC RX 250 W HCPCS SELF ADMINISTERED DRUGS (ALT 637 FOR MEDICARE OP, ALT 636 FOR OP/ED): Performed by: PHYSICIAN ASSISTANT

## 2025-05-22 PROCEDURE — 85027 COMPLETE CBC AUTOMATED: CPT | Performed by: INTERNAL MEDICINE

## 2025-05-22 PROCEDURE — 82040 ASSAY OF SERUM ALBUMIN: CPT | Performed by: INTERNAL MEDICINE

## 2025-05-22 PROCEDURE — 2500000004 HC RX 250 GENERAL PHARMACY W/ HCPCS (ALT 636 FOR OP/ED): Mod: JZ | Performed by: NURSE PRACTITIONER

## 2025-05-22 PROCEDURE — 1200000002 HC GENERAL ROOM WITH TELEMETRY DAILY

## 2025-05-22 PROCEDURE — 99232 SBSQ HOSP IP/OBS MODERATE 35: CPT | Performed by: NURSE PRACTITIONER

## 2025-05-22 PROCEDURE — 36415 COLL VENOUS BLD VENIPUNCTURE: CPT | Performed by: INTERNAL MEDICINE

## 2025-05-22 PROCEDURE — 2500000001 HC RX 250 WO HCPCS SELF ADMINISTERED DRUGS (ALT 637 FOR MEDICARE OP): Performed by: PHYSICIAN ASSISTANT

## 2025-05-22 RX ORDER — SODIUM CHLORIDE 9 MG/ML
100 INJECTION, SOLUTION INTRAVENOUS CONTINUOUS
Status: ACTIVE | OUTPATIENT
Start: 2025-05-22 | End: 2025-05-23

## 2025-05-22 RX ADMIN — ASPIRIN 81 MG CHEWABLE TABLET 81 MG: 81 TABLET CHEWABLE at 08:18

## 2025-05-22 RX ADMIN — DONEPEZIL HYDROCHLORIDE 10 MG: 10 TABLET ORAL at 08:18

## 2025-05-22 RX ADMIN — PHENOBARBITAL 64.8 MG: 32.4 TABLET ORAL at 06:24

## 2025-05-22 RX ADMIN — LISINOPRIL 5 MG: 5 TABLET ORAL at 08:18

## 2025-05-22 RX ADMIN — RIVAROXABAN 20 MG: 20 TABLET, FILM COATED ORAL at 18:09

## 2025-05-22 RX ADMIN — EXTENDED PHENYTOIN SODIUM 200 MG: 100 CAPSULE, EXTENDED RELEASE ORAL at 06:24

## 2025-05-22 RX ADMIN — ATORVASTATIN CALCIUM 40 MG: 40 TABLET, FILM COATED ORAL at 18:09

## 2025-05-22 RX ADMIN — SERTRALINE HYDROCHLORIDE 50 MG: 50 TABLET ORAL at 06:24

## 2025-05-22 RX ADMIN — EXTENDED PHENYTOIN SODIUM 200 MG: 100 CAPSULE, EXTENDED RELEASE ORAL at 18:08

## 2025-05-22 RX ADMIN — Medication 50 MCG: at 08:18

## 2025-05-22 RX ADMIN — METOPROLOL TARTRATE 100 MG: 100 TABLET, FILM COATED ORAL at 18:09

## 2025-05-22 RX ADMIN — SODIUM CHLORIDE 1000 ML: 0.9 INJECTION, SOLUTION INTRAVENOUS at 12:50

## 2025-05-22 RX ADMIN — METOPROLOL TARTRATE 100 MG: 100 TABLET, FILM COATED ORAL at 06:24

## 2025-05-22 RX ADMIN — SERTRALINE HYDROCHLORIDE 50 MG: 50 TABLET ORAL at 18:09

## 2025-05-22 RX ADMIN — PHENOBARBITAL 64.8 MG: 32.4 TABLET ORAL at 18:09

## 2025-05-22 RX ADMIN — SODIUM CHLORIDE 100 ML/HR: 0.9 INJECTION, SOLUTION INTRAVENOUS at 13:50

## 2025-05-22 RX ADMIN — OXCARBAZEPINE 1200 MG: 150 TABLET, FILM COATED ORAL at 06:24

## 2025-05-22 RX ADMIN — ALPRAZOLAM 0.25 MG: 0.25 TABLET ORAL at 18:09

## 2025-05-22 SDOH — ECONOMIC STABILITY: FOOD INSECURITY: WITHIN THE PAST 12 MONTHS, THE FOOD YOU BOUGHT JUST DIDN'T LAST AND YOU DIDN'T HAVE MONEY TO GET MORE.: NEVER TRUE

## 2025-05-22 SDOH — HEALTH STABILITY: PHYSICAL HEALTH: ON AVERAGE, HOW MANY DAYS PER WEEK DO YOU ENGAGE IN MODERATE TO STRENUOUS EXERCISE (LIKE A BRISK WALK)?: 0 DAYS

## 2025-05-22 SDOH — SOCIAL STABILITY: SOCIAL INSECURITY: WITHIN THE LAST YEAR, HAVE YOU BEEN AFRAID OF YOUR PARTNER OR EX-PARTNER?: NO

## 2025-05-22 SDOH — ECONOMIC STABILITY: HOUSING INSECURITY: AT ANY TIME IN THE PAST 12 MONTHS, WERE YOU HOMELESS OR LIVING IN A SHELTER (INCLUDING NOW)?: NO

## 2025-05-22 SDOH — ECONOMIC STABILITY: FOOD INSECURITY: WITHIN THE PAST 12 MONTHS, YOU WORRIED THAT YOUR FOOD WOULD RUN OUT BEFORE YOU GOT THE MONEY TO BUY MORE.: NEVER TRUE

## 2025-05-22 SDOH — ECONOMIC STABILITY: INCOME INSECURITY: IN THE PAST 12 MONTHS HAS THE ELECTRIC, GAS, OIL, OR WATER COMPANY THREATENED TO SHUT OFF SERVICES IN YOUR HOME?: NO

## 2025-05-22 SDOH — SOCIAL STABILITY: SOCIAL INSECURITY
WITHIN THE LAST YEAR, HAVE YOU BEEN KICKED, HIT, SLAPPED, OR OTHERWISE PHYSICALLY HURT BY YOUR PARTNER OR EX-PARTNER?: NO

## 2025-05-22 SDOH — HEALTH STABILITY: MENTAL HEALTH
DO YOU FEEL STRESS - TENSE, RESTLESS, NERVOUS, OR ANXIOUS, OR UNABLE TO SLEEP AT NIGHT BECAUSE YOUR MIND IS TROUBLED ALL THE TIME - THESE DAYS?: NOT AT ALL

## 2025-05-22 SDOH — SOCIAL STABILITY: SOCIAL NETWORK
DO YOU BELONG TO ANY CLUBS OR ORGANIZATIONS SUCH AS CHURCH GROUPS, UNIONS, FRATERNAL OR ATHLETIC GROUPS, OR SCHOOL GROUPS?: NO

## 2025-05-22 SDOH — ECONOMIC STABILITY: TRANSPORTATION INSECURITY: IN THE PAST 12 MONTHS, HAS LACK OF TRANSPORTATION KEPT YOU FROM MEDICAL APPOINTMENTS OR FROM GETTING MEDICATIONS?: NO

## 2025-05-22 SDOH — SOCIAL STABILITY: SOCIAL INSECURITY: WITHIN THE LAST YEAR, HAVE YOU BEEN HUMILIATED OR EMOTIONALLY ABUSED IN OTHER WAYS BY YOUR PARTNER OR EX-PARTNER?: NO

## 2025-05-22 SDOH — ECONOMIC STABILITY: HOUSING INSECURITY: IN THE LAST 12 MONTHS, WAS THERE A TIME WHEN YOU WERE NOT ABLE TO PAY THE MORTGAGE OR RENT ON TIME?: NO

## 2025-05-22 SDOH — ECONOMIC STABILITY: FOOD INSECURITY: HOW HARD IS IT FOR YOU TO PAY FOR THE VERY BASICS LIKE FOOD, HOUSING, MEDICAL CARE, AND HEATING?: NOT VERY HARD

## 2025-05-22 SDOH — SOCIAL STABILITY: SOCIAL INSECURITY: ARE YOU MARRIED, WIDOWED, DIVORCED, SEPARATED, NEVER MARRIED, OR LIVING WITH A PARTNER?: MARRIED

## 2025-05-22 SDOH — SOCIAL STABILITY: SOCIAL NETWORK
IN A TYPICAL WEEK, HOW MANY TIMES DO YOU TALK ON THE PHONE WITH FAMILY, FRIENDS, OR NEIGHBORS?: MORE THAN THREE TIMES A WEEK

## 2025-05-22 SDOH — SOCIAL STABILITY: SOCIAL NETWORK: HOW OFTEN DO YOU ATTEND MEETINGS OF THE CLUBS OR ORGANIZATIONS YOU BELONG TO?: NEVER

## 2025-05-22 SDOH — HEALTH STABILITY: PHYSICAL HEALTH: ON AVERAGE, HOW MANY MINUTES DO YOU ENGAGE IN EXERCISE AT THIS LEVEL?: 0 MIN

## 2025-05-22 SDOH — HEALTH STABILITY: PHYSICAL HEALTH
HOW OFTEN DO YOU NEED TO HAVE SOMEONE HELP YOU WHEN YOU READ INSTRUCTIONS, PAMPHLETS, OR OTHER WRITTEN MATERIAL FROM YOUR DOCTOR OR PHARMACY?: SOMETIMES

## 2025-05-22 SDOH — SOCIAL STABILITY: SOCIAL INSECURITY
WITHIN THE LAST YEAR, HAVE YOU BEEN RAPED OR FORCED TO HAVE ANY KIND OF SEXUAL ACTIVITY BY YOUR PARTNER OR EX-PARTNER?: NO

## 2025-05-22 SDOH — ECONOMIC STABILITY: HOUSING INSECURITY: IN THE PAST 12 MONTHS, HOW MANY TIMES HAVE YOU MOVED WHERE YOU WERE LIVING?: 0

## 2025-05-22 SDOH — SOCIAL STABILITY: SOCIAL NETWORK: HOW OFTEN DO YOU ATTEND CHURCH OR RELIGIOUS SERVICES?: NEVER

## 2025-05-22 SDOH — SOCIAL STABILITY: SOCIAL NETWORK: HOW OFTEN DO YOU GET TOGETHER WITH FRIENDS OR RELATIVES?: MORE THAN THREE TIMES A WEEK

## 2025-05-22 ASSESSMENT — COGNITIVE AND FUNCTIONAL STATUS - GENERAL
MOVING FROM LYING ON BACK TO SITTING ON SIDE OF FLAT BED WITH BEDRAILS: A LITTLE
DRESSING REGULAR UPPER BODY CLOTHING: A LITTLE
TURNING FROM BACK TO SIDE WHILE IN FLAT BAD: A LITTLE
CLIMB 3 TO 5 STEPS WITH RAILING: A LITTLE
MOVING TO AND FROM BED TO CHAIR: A LITTLE
PERSONAL GROOMING: A LITTLE
STANDING UP FROM CHAIR USING ARMS: A LITTLE
TOILETING: A LITTLE
WALKING IN HOSPITAL ROOM: A LITTLE
DRESSING REGULAR LOWER BODY CLOTHING: A LITTLE
DAILY ACTIVITIY SCORE: 19
MOBILITY SCORE: 18
HELP NEEDED FOR BATHING: A LITTLE

## 2025-05-22 ASSESSMENT — PAIN SCALES - GENERAL: PAINLEVEL_OUTOF10: 0 - NO PAIN

## 2025-05-22 ASSESSMENT — ACTIVITIES OF DAILY LIVING (ADL): LACK_OF_TRANSPORTATION: NO

## 2025-05-22 NOTE — PROGRESS NOTES
Hunter Dejesus is a 75 y.o. male on day 0 of admission presenting with Dizziness.      Subjective   5/22: Patient examined at bedside. No events overnight. He is eating and speaking in full sentences. Continues to feel dizzy, orthostatic vitals are borderline positive. discontinue lisinopril and reassess orthostatic vitals in the AM.       Objective     Last Recorded Vitals  /56 (Patient Position: Sitting)   Pulse 60   Temp 36.4 °C (97.5 °F) (Temporal)   Resp 20   Wt 125 kg (275 lb)   SpO2 100%   Intake/Output last 3 Shifts:    Intake/Output Summary (Last 24 hours) at 5/22/2025 1639  Last data filed at 5/22/2025 1350  Gross per 24 hour   Intake 1000 ml   Output --   Net 1000 ml       Admission Weight  Weight: 125 kg (275 lb) (05/20/25 0748)    Daily Weight  05/20/25 : 125 kg (275 lb)    Image Results  Vascular US Carotid Artery Duplex Bilateral             [place] [id number]  Tel [phone] and Fax [phone]       Vascular Lab Report  [facility] US CAROTID ARTERY DUPLEX BILATERAL       Patient Name:      HUNTER DEJESUS          Reading Physician:  [id number] Jacey Sorto MD, RPVI  Study Date:        5/20/2025            Ordering Physician: [id number] PASCUAL  MRN/PID:           [id number]             Technologist:       Mira Heredia RVT  Accession#:        [id number]         Technologist 2:  Date of Birth/Age: 1950 / 75 years Encounter#:         [id number]  Gender:            M  Admission Status:  Emergency            Location Performed: [facility]       Diagnosis/ICD: Dizziness and giddiness-R42  CPT Codes:     20716 Cerebrovascular Carotid Duplex scan complete       CONCLUSIONS:  Right Carotid: Findings are consistent with less than 50% stenosis of the right proximal internal carotid artery. Laminar flow seen by color Doppler. Right  external carotid artery appears patent with no evidence of stenosis. No evidence of hemodynamically significant stenosis of the right common carotid artery. The right vertebral artery is patent with antegrade flow. No evidence of hemodynamically significant stenosis in the right subclavian artery.  Left Carotid: Findings are consistent with less than 50% stenosis of the left proximal internal carotid artery. Laminar flow seen by color Doppler. Left external carotid artery appears patent with no evidence of stenosis. No evidence of hemodynamically significant stenosis of the left common carotid artery. The left vertebral artery is patent with antegrade flow. No evidence of hemodynamically significant stenosis in the left subclavian artery.     Imaging & Doppler Findings:  Right Plaque Morph: The proximal right internal carotid artery demonstrates heterogenous plaque. The proximal right external carotid artery demonstrates heterogenous plaque. The distal right common carotid artery demonstrates heterogenous plaque.  Left Plaque Morph: The proximal left internal carotid artery demonstrates heterogenous plaque. The distal left common carotid artery demonstrates heterogenous plaque.      Right                        Left    PSV      EDV                PSV      EDV  89 cm/s            CCA P    97 cm/s  68 cm/s            CCA D    81 cm/s  68 cm/s  16 cm/s   ICA P    84 cm/s  20 cm/s  79 cm/s  21 cm/s   ICA M    74 cm/s  17 cm/s  83 cm/s  18 cm/s   ICA D    88 cm/s  24 cm/s  132 cm/s            ECA     101 cm/s  49 cm/s  11 cm/s Vertebral  69 cm/s  17 cm/s  206 cm/s         Subclavian 185 cm/s                  Right Left  ICA/CCA Ratio  1.0  1.0          [id number] Jacey Sorto MD, BABITA  Electronically signed by [id number] Jacey Sorto MD, BABITA on 5/22/2025 at 12:02:53 AM       ** Final **      Physical Exam    General: in no acute distress  Eyes: PERRLA   HENT: Normo-cephalic, atraumatic.   CV: Regular rate, regular rhythm.   Resp:  Breathing non-labored,  diminished to auscultation bilaterally  GI: BS x4   : monitor intake and output  MSK/Extremities: No gross bony deformities. PT/OT on the case  Skin: Warm. Appropriate color, continue offloading  Neuro:  Face symmetric.   Psych: returning to baseline, improved     10 point ROS negative unless otherwise noted in HPI    Patient fully evaluated 5/22  for    Problem List Items Addressed This Visit          Symptoms and Signs    * (Principal) Dizziness - Primary    Relevant Orders    Phenytoin level, total (dilantin) (Completed)    Oxcarbazepine level (Completed)    Phenobarbital level (Completed)    Urinalysis with Reflex Culture and Microscopic (Completed)    CT abdomen pelvis wo IV contrast (Completed)    CT cervical spine wo IV contrast (Completed)    CBC and Auto Differential (Completed)    Magnesium (Completed)    Comprehensive metabolic panel (Completed)    CT head wo IV contrast (Completed)    CT lumbar spine retrospective reconstruction protocol (Completed)    CT thoracic spine wo IV contrast (Completed)    Urinalysis Microscopic (Completed)    Vascular US Carotid Artery Duplex Bilateral (Completed)    Transthoracic Echo Complete (Completed)    Difficulty walking    Relevant Orders    Phenytoin level, total (dilantin) (Completed)    Oxcarbazepine level (Completed)    Phenobarbital level (Completed)    Urinalysis with Reflex Culture and Microscopic (Completed)    CT abdomen pelvis wo IV contrast (Completed)    CT cervical spine wo IV contrast (Completed)    CBC and Auto Differential (Completed)    Magnesium (Completed)    Comprehensive metabolic panel (Completed)    CT head wo IV contrast (Completed)    CT lumbar spine retrospective reconstruction protocol (Completed)    CT thoracic spine wo IV contrast (Completed)    Urinalysis Microscopic (Completed)    Vascular US Carotid Artery Duplex Bilateral (Completed)    Transthoracic Echo Complete (Completed)     Other Visit Diagnoses          Drug-induced dizziness        Relevant Orders    Transthoracic Echo Complete (Completed)      Encounter for other specified special examinations          Encounter for other preprocedural examination              Patient seen resting in bed with head of bed elevated, no s/s or c/o acute difficulties at this time.  Vital signs for last 24 hours Temp:  [35.2 °C (95.4 °F)-36.7 °C (98.1 °F)] 36.4 °C (97.5 °F)  Heart Rate:  [60-62] 60  Resp:  [17-20] 20  BP: (112-149)/(56-70) 112/56    I/O this shift:  In: 1000 [IV Piggyback:1000]  Out: -   Patient still requiring frequent cardiac and SPO2 monitoring. Continue aggressive pulmonary hygiene and oral hygiene. Off loading as tolerated for skin integrity. Medications and labs reviewed-   Results for orders placed or performed during the hospital encounter of 05/20/25 (from the past 24 hours)   Vitamin B12   Result Value Ref Range    Vitamin B12 313 211 - 911 pg/mL   Folate   Result Value Ref Range    Folate, Serum 11.9 >5.0 ng/mL   Vitamin D 25-Hydroxy,Total (for eval of Vitamin D levels)   Result Value Ref Range    Vitamin D, 25-Hydroxy, Total 41 30 - 100 ng/mL   TSH with reflex to Free T4 if abnormal   Result Value Ref Range    Thyroid Stimulating Hormone 0.27 (L) 0.44 - 3.98 mIU/L   Thyroxine, Free   Result Value Ref Range    Thyroxine, Free 0.72 0.61 - 1.12 ng/dL   Comprehensive Metabolic Panel   Result Value Ref Range    Glucose 84 74 - 99 mg/dL    Sodium 142 136 - 145 mmol/L    Potassium 3.5 3.5 - 5.3 mmol/L    Chloride 108 (H) 98 - 107 mmol/L    Bicarbonate 27 21 - 32 mmol/L    Anion Gap 11 10 - 20 mmol/L    Urea Nitrogen 18 6 - 23 mg/dL    Creatinine 0.76 0.50 - 1.30 mg/dL    eGFR >90 >60 mL/min/1.73m*2    Calcium 8.8 8.6 - 10.3 mg/dL    Albumin 3.7 3.4 - 5.0 g/dL    Alkaline Phosphatase 53 33 - 136 U/L    Total Protein 6.0 (L) 6.4 - 8.2 g/dL    AST 20 9 - 39 U/L    Bilirubin, Total 0.6 0.0 - 1.2 mg/dL    ALT 15 10 - 52 U/L   CBC   Result Value Ref Range    WBC 5.3  4.4 - 11.3 x10*3/uL    nRBC 0.0 0.0 - 0.0 /100 WBCs    RBC 3.17 (L) 4.50 - 5.90 x10*6/uL    Hemoglobin 11.7 (L) 13.5 - 17.5 g/dL    Hematocrit 34.5 (L) 41.0 - 52.0 %     (H) 80 - 100 fL    MCH 36.9 (H) 26.0 - 34.0 pg    MCHC 33.9 32.0 - 36.0 g/dL    RDW 15.2 (H) 11.5 - 14.5 %    Platelets 199 150 - 450 x10*3/uL      Patient recently received an antibiotic (last 12 hours)       None           Plan discussed with interdisciplinary team, continue current and repeat labs in the AM.       Discharge planning discussed with patient and care team. Therapy evaluations ordered.   [facility]- 18  Anticipate - Rehab     Patient aware and agreeable to current plan, continue plan as above.     I spent a total of 60 minutes on the date of the service which included preparing to see the patient, face-to-face patient care, completing clinical documentation, obtaining and/or reviewing separately obtained history, performing a medically appropriate examination, counseling and educating the patient/family/caregiver, ordering medications, tests, or procedures, communicating with other HCPs (not separately reported), independently interpreting results (not separately reported), communicating results to the patient/family/caregiver, and care coordination (not separately reported).                       Assessment & Plan  Dizziness    Multiple falls    Difficulty walking    Lewy body dementia (Multi)    HTN (hypertension)    Pulmonary nodule             AGUS VARGAS

## 2025-05-22 NOTE — CONSULTS
Cardiology Consult    Impression:  Orthostatic dizziness, autonomic instability.  Persistent atrial fibrillation.  Status post ablation June 2024.  No recurrence.  Permanent pacemaker for tachybradycardia syndrome.  No evidence for pacemaker dysfunction.  Echocardiogram showing no structural heart disease  Hypertension  History of seizure  Lewy body dementia  Plan:  Permissive hypertension.  Agree with holding lisinopril  Knee-high compression stockings  No additional inpatient cardiac workup required  Patient will follow-up with his regular cardiologist, Dr. Rossi at [facility].  CC  Dizziness  HPI:  75-year-old man presenting to hospital for evaluation of dizziness.  This been going on for many months.  When he stands up he feels dizzy and as though he may faint.  No symptoms when sitting or laying down.  Breathing okay.  No palpitations.  No chest pain.  Follows with Dr. Rossi at [facility] for cardiology.  Had an ablation last year for A-fib and has had no recurrences of the arrhythmia.  Meds:  Scheduled medications  Scheduled Medications[1]  Continuous medications  Continuous Medications[2]  PRN medications  PRN Medications[3]    PMHx:  As listed above  Social history:  No cigarettes or alcohol  Family history:  Noncontributory  Review of systems:  See HPI  Physical exam:  Vitals:    05/22/25 1605   BP: 112/56   Pulse: 60   Resp: 20   Temp: 36.4 °C (97.5 °F)   SpO2: 100%      JVP not elevated. Carotid upstroke normal. No carotid bruits. Heart sounds normal. No extra sounds or murmurs. Chest clear. Abdomen soft, nontender. No masses. Peripheral pulses palpable. No edema.  EKG:  Sinus rhythm.  Atrial paced.  Echo:  Normal EF.  Labs:  Lab Results   Component Value Date    WBC 5.3 05/22/2025    HGB 11.7 (L) 05/22/2025    HCT 34.5 (L) 05/22/2025     05/22/2025    CHOL 178 01/02/2021    TRIG 81 01/02/2021    HDL 66.7 01/02/2021    ALT 15 05/22/2025    AST 20 05/22/2025     05/22/2025    K 3.5 05/22/2025     (H)  05/22/2025    CREATININE 0.76 05/22/2025    BUN 18 05/22/2025    CO2 27 05/22/2025    TSH 0.27 (L) 05/21/2025    PSA 2.46 01/02/2021     par        [1] ALPRAZolam, 0.25 mg, oral, q AM  aspirin, 81 mg, oral, Daily  atorvastatin, 40 mg, oral, Nightly  cholecalciferol, 50 mcg, oral, Daily  donepezil, 10 mg, oral, q AM  metoprolol tartrate, 100 mg, oral, q12h VALENTE  OXcarbazepine, 1,200 mg, oral, q AM  OXcarbazepine, 900 mg, oral, Nightly  PHENobarbital, 64.8 mg, oral, BID  phenytoin ER, 200 mg, oral, BID  psyllium, 1 packet, oral, Daily  rivaroxaban, 20 mg, oral, Daily with evening meal  sertraline, 50 mg, oral, BID  [2] sodium chloride 0.9%, 100 mL/hr, Last Rate: 100 mL/hr (05/22/25 1350)  [3] PRN medications: acetaminophen **OR** acetaminophen **OR** acetaminophen, meclizine

## 2025-05-22 NOTE — CARE PLAN
The patient's goals for the shift include  safety and comfort      Problem: Safety - Adult  Goal: Free from fall injury  Outcome: Progressing  Flowsheets (Taken 5/21/2025 2323)  Free from fall injury: Instruct family/caregiver on patient safety     Problem: Discharge Planning  Goal: Discharge to home or other facility with appropriate resources  Outcome: Progressing  Flowsheets (Taken 5/21/2025 2323)  Discharge to home or other facility with appropriate resources: Identify barriers to discharge with patient and caregiver     Problem: Chronic Conditions and Co-morbidities  Goal: Patient's chronic conditions and co-morbidity symptoms are monitored and maintained or improved  Outcome: Progressing  Flowsheets (Taken 5/21/2025 2323)  Care Plan - Patient's Chronic Conditions and Co-Morbidity Symptoms are Monitored and Maintained or Improved: Monitor and assess patient's chronic conditions and comorbid symptoms for stability, deterioration, or improvement     Problem: Nutrition  Goal: Nutrient intake appropriate for maintaining nutritional needs  Outcome: Progressing

## 2025-05-22 NOTE — PROGRESS NOTES
05/22/25 1000   Discharge Planning   Living Arrangements Spouse/significant other   Support Systems Spouse/significant other   Type of Residence Private residence   Number of Stairs to Enter Residence 5   Number of Stairs Within Residence 10   Expected Discharge Disposition SNF   Does the patient need discharge transport arranged? Yes   RoundTrip coordination needed? Yes     I revisited patient's bedside, wife on speaker phone, to follow up on SNF choices.  After reviewing SNF list, patient and his wife verbalized preference for [facility] and [facility]; referrals sent; patient will need precert.  Kate LEWIS TCC

## 2025-05-22 NOTE — CONSULTS
Nutrition Note:     Reason for Assessment: Admission nursing screening    Patient is a 75 y.o. male presenting with dizziness. Pt reports he was eating less x 1.5-2 months due to a medication he was on possibly causing a decrease in appetite. Pt also states he was choosing to eat less and it is unclear if pt was intentionally eating less to help facilitate weight loss. Pt feels he was overeating before and is now eating a more appropriate amount. He has one documented meal itake of 100% this admission. He does report 25# wt loss during the 1.5-2 months he was on this medication. Based on available wt records in EMR, pt weighed 135.5 kg in February at [facility] - this is a 10.5 kg (7.7%) wt loss x 3 months which is significant, however, current weight is stated and may not be accurate.    Discussed with pt that his diet should include good sources of protein with each meal, several times per day, fruits and vegetables and whole grains, and adequate portion sizes to prevent excessive weight loss of >1# per week.     Pt has no additional nutrition concerns or questions at this time. Full assessment not indicated as pt suggests part of his weight loss and diet change was desired/intentional.     Time Spent (min): 20 minutes

## 2025-05-22 NOTE — PROGRESS NOTES
Physical Therapy                 Therapy Communication Note    Patient Name: Jose Luis Dejesus  MRN: [id number]  Department: [facility]  Room: [facility]  Today's Date: 5/22/2025     Discipline: Physical Therapy    Missed Visit: PT Missed Visit: Yes     Missed Visit Reason: Patient politely declining participation in therapy at this time 2/2 severe dizziness; pt states he feels like he will pass out if he were to stand up.  will continue to follow and re-attempt as able/appropriate.    Missed Time: Attempt at 10:08

## 2025-05-22 NOTE — PROGRESS NOTES
Hunter Dejesus is a 75 y.o. male on day 0 of admission presenting with Dizziness.      Subjective   CT angiogram of the head and neck have been completed, and are negative for any acute findings of significant stenosis that could be attributing to dizziness.  Echocardiogram also completed, with ejection fraction noted at 66%.  Reversible causes of dementia labs are all unremarkable.  Oxcarbazepine level therapeutic.  Lastly, orthostatic vital signs do appear to be borderline positive from a lying to sitting position, ranging from 136 systolically to 119.  Orders placed for patient to receive 1 L normal saline bolus for these findings, and to continue fluid rehydration at 100 cc an hour x 24 hours.  He is to follow-up with neurology in the outpatient setting as previously established, and to undergo MRI of the brain, C, and L-spine without contrast plus EMG.  General neurology to sign off on care.        Objective     Last Recorded Vitals  Blood pressure 126/60, pulse 61, temperature 36.1 °C (97 °F), resp. rate 17, height 1.829 m (6'), weight 125 kg (275 lb), SpO2 99%.    Relevant Results  Scheduled medications  Scheduled Medications[1]  Continuous medications  Continuous Medications[2]  PRN medications  PRN Medications[3]  Vascular US Carotid Artery Duplex Bilateral  Result Date: 5/22/2025           [place] [address] Tel [phone] and Fax [phone]  Vascular Lab Report [facility] US CAROTID ARTERY DUPLEX BILATERAL  Patient Name:      HUNTER DEJESUS          Reading Physician:  [id number] Jacey Sorto MD, RPVI Study Date:        5/20/2025            Ordering Physician: [id number] PASCUAL MRN/PID:           [id number]             Technologist:       Mira Heredia RVT Accession#:        [id number]         Technologist 2: Date of Birth/Age: 1950 / 75 years  Encounter#:         [id number] Gender:            M Admission Status:  Emergency            Location Performed: [facility]  Diagnosis/ICD: Dizziness and giddiness-R42 CPT Codes:     94997 Cerebrovascular Carotid Duplex scan complete  CONCLUSIONS: Right Carotid: Findings are consistent with less than 50% stenosis of the right proximal internal carotid artery. Laminar flow seen by color Doppler. Right external carotid artery appears patent with no evidence of stenosis. No evidence of hemodynamically significant stenosis of the right common carotid artery. The right vertebral artery is patent with antegrade flow. No evidence of hemodynamically significant stenosis in the right subclavian artery. Left Carotid: Findings are consistent with less than 50% stenosis of the left proximal internal carotid artery. Laminar flow seen by color Doppler. Left external carotid artery appears patent with no evidence of stenosis. No evidence of hemodynamically significant stenosis of the left common carotid artery. The left vertebral artery is patent with antegrade flow. No evidence of hemodynamically significant stenosis in the left subclavian artery.  Imaging & Doppler Findings: Right Plaque Morph: The proximal right internal carotid artery demonstrates heterogenous plaque. The proximal right external carotid artery demonstrates heterogenous plaque. The distal right common carotid artery demonstrates heterogenous plaque. Left Plaque Morph: The proximal left internal carotid artery demonstrates heterogenous plaque. The distal left common carotid artery demonstrates heterogenous plaque.   Right                        Left   PSV      EDV                PSV      EDV 89 cm/s            CCA P    97 cm/s 68 cm/s            CCA D    81 cm/s 68 cm/s  16 cm/s   ICA P    84 cm/s  20 cm/s 79 cm/s  21 cm/s   ICA M    74 cm/s  17 cm/s 83 cm/s  18 cm/s   ICA D    88 cm/s  24 cm/s 132 cm/s            ECA     101 cm/s 49 cm/s  11 cm/s  Vertebral  69 cm/s  17 cm/s 206 cm/s         Subclavian 185 cm/s               Right Left ICA/CCA Ratio  1.0  1.0   [id number] BABITA Curran MD Electronically signed by [id number] BABITA Curran MD on 5/22/2025 at 12:02:53 AM  ** Final **     CT angio head and neck w and wo IV contrast  Result Date: 5/21/2025  Interpreted By:  Henrique Myers, STUDY: CT ANGIO HEAD AND NECK W AND WO IV CONTRAST;  5/21/2025 7:37 pm   INDICATION: Signs/Symptoms:dementia workup.     COMPARISON: CT head 1 day prior   ACCESSION NUMBER(S): [id number]   ORDERING CLINICIAN: CARMEN FISHMAN   TECHNIQUE: Unenhanced CT images of the head were obtained. Subsequently, 75 ML of Omnipaque 350 was administered intravenously and axial images of the head and neck were acquired.  Coronal, sagittal, and 3-D reconstructions were provided for review.   FINDINGS: Partially visualized left hemithorax pacemaker.   CT head without contrast. No acute intracranial hemorrhage, mass effect or midline shift.   CTA HEAD FINDINGS:   Anterior circulation: The bilateral intracranial internal carotid arteries, bilateral carotid terminals, bilateral proximal anterior and middle cerebral arteries are normal. Minimal atherosclerotic calcifications at the carotid termini and cavernous portions.   Posterior circulation: Bilateral intracranial vertebral arteries, vertebrobasilar junction, basilar artery and proximal posterior cerebral arteries are normal.   CTA NECK FINDINGS: Two-vessel aortic arch with the left common carotid sharing in origin off the innominate artery. The great vessels are patent at their origins.   Right carotid vessels: The common carotid artery is normal. The carotid bifurcation is normal. The internal carotid artery in the neck is normal. There is less than 10% stenosis secondary to atherosclerotic calcifications.   Left carotid vessels: The common carotid artery is normal. The carotid bifurcation is normal. The internal carotid artery in the neck is  normal. There is less than 25% stenosis of the proximal left ICA secondary to atherosclerotic calcifications.   Vertebral vessels:  The visualized segments of the cervical vertebral arteries are normal in caliber.         No evidence for significant stenosis of the cervical vessels. Less than 25% bilateral ICA stenosis.   No evidence for significant stenosis or large branch vessel cutoffs of the intracranial vessels.   MACRO: None   Signed by: Henrique Myers 5/21/2025 7:53 PM Dictation workstation:   [id number]    Transthoracic Echo Complete  Result Date: 5/21/2025   [facility], [address] [place] [address]           Tel [phone] and Fax [phone] TRANSTHORACIC ECHOCARDIOGRAM REPORT  Patient Name:       HUNTER Alston Physician:    [id number] Omar Aguilera MD Study Date:         5/21/2025           Ordering Provider:    [id number] GIANCARLO PASCUAL MRN/PID:            [id number]            Fellow: Accession#:         [id number]        Nurse: Date of Birth/Age:  1950 / 75      Sonographer:          Deshawn keith                                     MICKEY Gender assigned at  M                   Additional Staff: Birth: Height:             182.00 cm           Admit Date:           5/20/2025 Weight:             125.00 kg           Admission Status:     Inpatient -                                                               Routine BSA / BMI:          2.43 m2 / 37.74     Encounter#:           [id number]                     kg/m2 Blood Pressure:     133/62 mmHg         Department Location:  [address]                                                               [facility] Study Type:    TRANSTHORACIC ECHO (TTE) COMPLETE Diagnosis/ICD: Dizziness and giddiness-R42; Encounter for other specified                special  examinations-Z01.89; Encounter for other preprocedural                examination-Z01.818 Indication:    Drug-induced dizziness;Difficulty walking; CPT Code:      Echo Complete w Full Doppler-[id number] Patient History: Pertinent History: PMH obesity, atrial fibrillation (on Xarelto) s/p ablation,                    tachybradycardia syndrome s/p PPM, hypertension, history of                    seizures, and Lewy body dementia. Study Detail: The following Echo studies were performed: 2D, M-Mode, Doppler,               color flow and 3D. Technically challenging study due to body               habitus and poor acoustic windows. Definity used as a contrast               agent for endocardial border definition. Total contrast used for               this procedure was 3 mL via IV push. Unable to obtain suprasternal               notch and subcostal view.  PHYSICIAN INTERPRETATION: Left Ventricle: Left ventricular ejection fraction is normal, calculated by Rene's biplane at 66%. There are no regional left ventricular wall motion abnormalities. The left ventricular cavity size is normal. There is mildly increased septal and mildly increased posterior left ventricular wall thickness. Spectral Doppler shows a normal pattern of left ventricular diastolic filling. Left Atrium: The left atrial size is normal. Right Ventricle: The right ventricle is normal in size. There is normal right ventricular global systolic function. A device is visualized in the right ventricle. Right Atrium: The right atrium is mildly dilated. There is a device visualized in the right atrium. Aortic Valve: The aortic valve was not well visualized. The aortic valve dimensionless index is 0.72. There is no evidence of aortic valve regurgitation. The peak instantaneous gradient of the aortic valve is 4 mmHg. The mean gradient of the aortic valve is 3 mmHg. Mitral Valve: The mitral valve is normal in structure. The peak instantaneous gradient of the mitral  valve is 3 mmHg. There is no evidence of mitral valve regurgitation. Tricuspid Valve: The tricuspid valve is structurally normal. No evidence of tricuspid regurgitation. The right ventricular systolic pressure is unable to be estimated. Pulmonic Valve: The pulmonic valve is not well visualized. The pulmonic valve regurgitation was not well visualized. Pericardium: There is no pericardial effusion noted. Aorta: The aortic root is normal. Systemic Veins: The inferior vena cava was not well visualized. In comparison to the previous echocardiogram(s): Although previous studies are available for review, a comparison with the current echocardiogram is not feasible due to its limited scope or image quality. Compared with study dated 12/15/2012,.  CONCLUSIONS:  1. Left ventricular ejection fraction is normal, calculated by Rene's biplane at 66%.  2. There is normal right ventricular global systolic function. QUANTITATIVE DATA SUMMARY:  2D MEASUREMENTS:          Normal Ranges: LAs:             3.40 cm  (2.7-4.0cm) IVSd:            1.10 cm  (0.6-1.1cm) LVPWd:           1.10 cm  (0.6-1.1cm) LVIDd:           5.40 cm  (3.9-5.9cm) LVIDs:           4.00 cm LV Mass Index:   97 g/m2 LVEDV Index:     57 ml/m2 LV % FS          25.9 %  LEFT ATRIUM:                  Normal Ranges: LA Vol A4C:        84.4 ml    (22+/-6mL/m2) LA Vol A2C:        44.8 ml LA Vol BP:         66.1 ml LA Vol Index A4C:  34.7ml/m2 LA Vol Index A2C:  18.4 ml/m2 LA Vol Index BP:   27.2 ml/m2 LA Area A4C:       25.7 cm2 LA Area A2C:       17.4 cm2 LA Major Axis A4C: 6.6 cm LA Major Axis A2C: 5.8 cm LA Volume Index:   27.2 ml/m2  RIGHT ATRIUM:                Normal Ranges: RA Vol A4C:        15.3 ml   (8.3-19.5ml) RA Vol Index A4C:  6.3 ml/m2 RA Area A4C:       18.0 cm2 RA Major Axis A4C: 18.0 cm  M-MODE MEASUREMENTS:         Normal Ranges: Ao Root:             3.90 cm (2.0-3.7cm) LAs:                 3.90 cm (2.7-4.0cm)  AORTA MEASUREMENTS:         Normal  Ranges: Ao Sinus, d:        3.50 cm (2.1-3.5cm) Ao STJ, d:          3.40 cm (1.7-3.4cm)  LV SYSTOLIC FUNCTION:                      Normal Ranges: EF-A4C View:    63 % (>=55%) EF-A2C View:    65 % EF-Biplane:     66 % LV EF Reported: 66 %  LV DIASTOLIC FUNCTION:             Normal Ranges: MV Peak E:             0.94 m/s    (0.7-1.2 m/s) MV Peak A:             0.52 m/s    (0.42-0.7 m/s) E/A Ratio:             1.80        (1.0-2.2) MV e'                  0.144 m/s   (>8.0) MV lateral e'          0.17 m/s MV medial e'           0.12 m/s MV A Dur:              116.00 msec E/e' Ratio:            6.55        (<8.0) PulmV Sys Florencio:         26.00 cm/s PulmV Brcie Florencio:        13.60 cm/s PulmV S/D Florencio:         1.90 PulmV A Revs Florencio:      24.60 cm/s PulmV A Revs Dur:      135.00 msec  MITRAL VALVE:          Normal Ranges: MV Vmax:      0.91 m/s (<=1.3m/s) MV peak PG:   3.3 mmHg (<5mmHg) MV mean P.0 mmHg (<2mmHg) MV DT:        238 msec (150-240msec)  AORTIC VALVE:                     Normal Ranges: AoV Vmax:                1.05 m/s (<=1.7m/s) AoV Peak P.4 mmHg (<20mmHg) AoV Mean PG:             3.0 mmHg (1.7-11.5mmHg) LVOT Max Florencio:            0.74 m/s (<=1.1m/s) AoV VTI:                 24.30 cm (18-25cm) LVOT VTI:                17.50 cm LVOT Diameter:           2.20 cm  (1.8-2.4cm) AoV Area, VTI:           2.74 cm2 (2.5-5.5cm2) AoV Area,Vmax:           2.69 cm2 (2.5-4.5cm2) AoV Dimensionless Index: 0.72  RIGHT VENTRICLE: RV Basal 4.00 cm RV Mid   3.30 cm RV Major 9.3 cm TAPSE:   33.0 mm RV s'    0.13 m/s  PULMONIC VALVE:          Normal Ranges: PV Max Florencio:     0.8 m/s  (0.6-0.9m/s) PV Max P.6 mmHg  PULMONARY VEINS: PulmV A Revs Dur: 135.00 msec PulmV A Revs Florencio: 24.60 cm/s PulmV Brice Florencio:   13.60 cm/s PulmV S/D Florencio:    1.90 PulmV Sys Florencio:    26.00 cm/s  [id number] Omar Aguilera MD Electronically signed on 2025 at 7:23:20 PM  ** Final **     ECG 12 Lead  Result Date: 2025  Atrial-paced  complexes Borderline T abnormalities, diffuse leads See ED provider note for full interpretation and clinical correlation Confirmed by Luz Miranda ([id number]) on 5/21/2025 2:38:33 PM    CT abdomen pelvis wo IV contrast  Result Date: 5/20/2025  Interpreted By:  Xavier Perez, STUDY: CT ABDOMEN PELVIS WO IV CONTRAST;  5/20/2025 9:08 am   INDICATION: Signs/Symptoms:back pain flank.   COMPARISON: None   ACCESSION NUMBER(S): [id number]   ORDERING CLINICIAN: TYLER YOUNG   TECHNIQUE: CT of the abdomen and pelvis was performed. Contiguous axial images were obtained at 3 mm slice thickness through the abdomen and pelvis. Coronal and sagittal reconstructions at 3 mm slice thickness were performed.  No intravenous contrast was administered.   FINDINGS: Please note that the evaluation of vessels, lymph nodes and organs is limited without intravenous contrast.   LOWER CHEST: Mild bibasilar atelectasis.   ABDOMEN:   LIVER: The liver is enlarged measuring 23.9 cm in craniocaudal dimension and demonstrates diffusely decreased attenuation suggesting steatosis. No focal lesion.   BILE DUCTS: The intrahepatic and extrahepatic ducts are not dilated.   GALLBLADDER: No calcified stones. No wall thickening.   PANCREAS: Within normal limits.   SPLEEN: Within normal limits.   ADRENAL GLANDS: Bilateral adrenal glands appear normal.   KIDNEYS AND URETERS: The kidneys are normal in size and unremarkable in appearance.  3 mm right upper pole nonobstructing calculus, without hydronephrosis.   PELVIS:   Evaluation of the urinary bladder is limited by streak artifact from bilateral hip hardware.   BLADDER: Within the limits of evaluation, the urinary bladder appears within normal limits.   REPRODUCTIVE ORGANS: No pelvic mass.   BOWEL: Status post gastric sleeve resection. No bowel dilation or significant wall thickening. Large colonic stool. Normal appendix.   VESSELS: Severe atherosclerotic changes are noted to the aorta and  branching vessels. There is no aneurysmal dilatation.   PERITONEUM/RETROPERITONEUM/LYMPH NODES: No ascites or free air, no fluid collection.  No enlarged mesenteric lymph nodes.   ABDOMINAL WALL: Within normal limits.   BONES: No suspicious osseous lesions are identified. Degenerative discogenic disease is noted in the lower thoracic and lumbar spine. Bilateral hip arthroplasty.       1. No acute findings in the abdomen and pelvis. 2. 3 mm right upper pole nonobstructing calculus, without hydronephrosis. 3. Hepatomegaly and diffuse steatosis.   Signed by: Xavier Perez 5/20/2025 10:08 AM Dictation workstation:   [id number]    CT cervical spine wo IV contrast  Result Date: 5/20/2025  Interpreted By:  Xavier Perez, STUDY: CT HEAD WO IV CONTRAST; CT CERVICAL SPINE WO IV CONTRAST; CT THORACIC SPINE WO IV CONTRAST; CT LUMBAR SPINE RETROSPECTIVE RECONSTRUCTION PROTOCOL;  5/20/2025 9:08 am   INDICATION: Signs/Symptoms:fall on left side; Signs/Symptoms:fall flexed neck forward; Signs/Symptoms:difficulty walking; Signs/Symptoms:back pain.   COMPARISON: MRI brain 08/30/2024   ACCESSION NUMBER(S): [id number]; [id number]; [id number]; [id number]   ORDERING CLINICIAN: TYLER YOUNG   TECHNIQUE: Noncontrast axial CT scan of head and complete spine was performed.   FINDINGS: BRAIN:   Parenchyma: There is no intracranial hemorrhage. The grey-white differentiation is intact. There is no mass effect or midline shift. Patchy supratentorial hypodensity, nonspecific, but likely secondary to mild chronic microvascular ischemia.   CSF Spaces: Mild generalized volume loss, with concordant ventricular enlargement.   Extra-Axial Fluid: There is no extraaxial fluid collection.   Calvarium: The calvarium is unremarkable.   Paranasal sinuses: Mild paranasal sinus mucosal thickening.   Mastoids: Clear.   Orbits: Bilateral lens replacements.   Soft tissues: Unremarkable.   CERVICAL SPINE:   ALIGNMENT: Straightening of the  cervical lordosis. Atlantoaxial interval is maintained. Vertebral body heights are within normal limits. Multilevel loss of disc spaces throughout the cervical spine.   VERTEBRAL BODIES AND POSTERIOR ELEMENTS: No cervical spine compression fracture.  No posterior element fracture.  No destructive bone lesion.   SPINAL CANAL: Multilevel degenerative changes of the cervical spine with up to mild canal stenosis andmoderate neural foraminal narrowing, most prominent at C5-C6 and C6-C7   NECK SOFT TISSUES: Within normal limits.   LUNG APICES: Imaged portion of the lung apices are within normal limits.   SKULL BASE: Within normal limits.     THORACIC SPINE:   Trauma: No acute fracture.   Alignment: Within normal limits.   Vertebral Body Heights: The thoracic vertebral body heights are intact.   Intervertebral Discs:  The disc spaces are grossly preserved.   Degenerative change: There is no significant spinal canal stenosis. No high-grade neural foraminal narrowing.   Paraspinous Soft Tissues: Within normal limits.   8 mm right upper lobe nodule (series 2, image 44).     LUMBAR SPINE:   Trauma: No acute fracture.   Alignment: Within normal limits.   Vertebral Body Heights: The lumbar vertebral body heights are intact.   Intervertebral Discs:  The disc spaces are grossly preserved.   Degenerative change: Mild diffuse osteopenia. Multilevel degenerative changes of the lumbar spine with up to mild canal stenosis andmoderate neural foraminal narrowing, most prominent at L4-L5 and L5-S1   Paraspinous Soft Tissues: Within normal limits.         Brain: No acute intracranial hemorrhage, mass effect, or CT apparent acute infarct. Chronic microvascular ischemia and involutional changes.   Cervical spine: No acute fracture or traumatic malalignment. Multilevel degenerative changes of the cervical spine, most prominent at C5-C6 and C6-C7.   Thoracic spine: No acute fracture or traumatic malalignment. No significant degenerative  changes of the thoracic spine. 8 mm right upper lobe pulmonary nodule.   Lumbar spine: No acute fracture or traumatic malalignment. Multilevel degenerative changes of the lumbar spine, most prominent at L4-L5 and L5-S1.   A solid non-calcified pulmonary nodule measuring 6-8 mm in the right upper lobe.Consider follow up non contrast chest CT at 6-12 months, then consider CT chest at 18-24 months (Braxton Cavanaugh et al., Guidelines for management of incidental pulmonary nodules detected on CT images: From the Fleischner Society 2017, Radiology. 2017 Jul;284 (1):228-243.) [email or web address]     Signed by: Xavier Perez 5/20/2025 10:04 AM Dictation workstation:   [id number]    CT head wo IV contrast  Result Date: 5/20/2025  Interpreted By:  Xavier Perez, STUDY: CT HEAD WO IV CONTRAST; CT CERVICAL SPINE WO IV CONTRAST; CT THORACIC SPINE WO IV CONTRAST; CT LUMBAR SPINE RETROSPECTIVE RECONSTRUCTION PROTOCOL;  5/20/2025 9:08 am   INDICATION: Signs/Symptoms:fall on left side; Signs/Symptoms:fall flexed neck forward; Signs/Symptoms:difficulty walking; Signs/Symptoms:back pain.   COMPARISON: MRI brain 08/30/2024   ACCESSION NUMBER(S): [id number]; [id number]; [id number]; [id number]   ORDERING CLINICIAN: TYLER YOUNG   TECHNIQUE: Noncontrast axial CT scan of head and complete spine was performed.   FINDINGS: BRAIN:   Parenchyma: There is no intracranial hemorrhage. The grey-white differentiation is intact. There is no mass effect or midline shift. Patchy supratentorial hypodensity, nonspecific, but likely secondary to mild chronic microvascular ischemia.   CSF Spaces: Mild generalized volume loss, with concordant ventricular enlargement.   Extra-Axial Fluid: There is no extraaxial fluid collection.   Calvarium: The calvarium is unremarkable.   Paranasal sinuses: Mild paranasal sinus mucosal thickening.   Mastoids: Clear.   Orbits: Bilateral lens replacements.   Soft tissues: Unremarkable.   CERVICAL SPINE:    ALIGNMENT: Straightening of the cervical lordosis. Atlantoaxial interval is maintained. Vertebral body heights are within normal limits. Multilevel loss of disc spaces throughout the cervical spine.   VERTEBRAL BODIES AND POSTERIOR ELEMENTS: No cervical spine compression fracture.  No posterior element fracture.  No destructive bone lesion.   SPINAL CANAL: Multilevel degenerative changes of the cervical spine with up to mild canal stenosis andmoderate neural foraminal narrowing, most prominent at C5-C6 and C6-C7   NECK SOFT TISSUES: Within normal limits.   LUNG APICES: Imaged portion of the lung apices are within normal limits.   SKULL BASE: Within normal limits.     THORACIC SPINE:   Trauma: No acute fracture.   Alignment: Within normal limits.   Vertebral Body Heights: The thoracic vertebral body heights are intact.   Intervertebral Discs:  The disc spaces are grossly preserved.   Degenerative change: There is no significant spinal canal stenosis. No high-grade neural foraminal narrowing.   Paraspinous Soft Tissues: Within normal limits.   8 mm right upper lobe nodule (series 2, image 44).     LUMBAR SPINE:   Trauma: No acute fracture.   Alignment: Within normal limits.   Vertebral Body Heights: The lumbar vertebral body heights are intact.   Intervertebral Discs:  The disc spaces are grossly preserved.   Degenerative change: Mild diffuse osteopenia. Multilevel degenerative changes of the lumbar spine with up to mild canal stenosis andmoderate neural foraminal narrowing, most prominent at L4-L5 and L5-S1   Paraspinous Soft Tissues: Within normal limits.         Brain: No acute intracranial hemorrhage, mass effect, or CT apparent acute infarct. Chronic microvascular ischemia and involutional changes.   Cervical spine: No acute fracture or traumatic malalignment. Multilevel degenerative changes of the cervical spine, most prominent at C5-C6 and C6-C7.   Thoracic spine: No acute fracture or traumatic malalignment. No  significant degenerative changes of the thoracic spine. 8 mm right upper lobe pulmonary nodule.   Lumbar spine: No acute fracture or traumatic malalignment. Multilevel degenerative changes of the lumbar spine, most prominent at L4-L5 and L5-S1.   A solid non-calcified pulmonary nodule measuring 6-8 mm in the right upper lobe.Consider follow up non contrast chest CT at 6-12 months, then consider CT chest at 18-24 months (Braxton Cavanaugh et al., Guidelines for management of incidental pulmonary nodules detected on CT images: From the Fleischner Society 2017, Radiology. 2017 Jul;284 (1):228-243.) [email or web address]     Signed by: Xavier Perez 5/20/2025 10:04 AM Dictation workstation:   [id number]    CT lumbar spine retrospective reconstruction protocol  Result Date: 5/20/2025  Interpreted By:  Xavier Perez, STUDY: CT HEAD WO IV CONTRAST; CT CERVICAL SPINE WO IV CONTRAST; CT THORACIC SPINE WO IV CONTRAST; CT LUMBAR SPINE RETROSPECTIVE RECONSTRUCTION PROTOCOL;  5/20/2025 9:08 am   INDICATION: Signs/Symptoms:fall on left side; Signs/Symptoms:fall flexed neck forward; Signs/Symptoms:difficulty walking; Signs/Symptoms:back pain.   COMPARISON: MRI brain 08/30/2024   ACCESSION NUMBER(S): [id number]; [id number]; [id number]; [id number]   ORDERING CLINICIAN: TYLER YOUNG   TECHNIQUE: Noncontrast axial CT scan of head and complete spine was performed.   FINDINGS: BRAIN:   Parenchyma: There is no intracranial hemorrhage. The grey-white differentiation is intact. There is no mass effect or midline shift. Patchy supratentorial hypodensity, nonspecific, but likely secondary to mild chronic microvascular ischemia.   CSF Spaces: Mild generalized volume loss, with concordant ventricular enlargement.   Extra-Axial Fluid: There is no extraaxial fluid collection.   Calvarium: The calvarium is unremarkable.   Paranasal sinuses: Mild paranasal sinus mucosal thickening.   Mastoids: Clear.   Orbits: Bilateral lens  replacements.   Soft tissues: Unremarkable.   CERVICAL SPINE:   ALIGNMENT: Straightening of the cervical lordosis. Atlantoaxial interval is maintained. Vertebral body heights are within normal limits. Multilevel loss of disc spaces throughout the cervical spine.   VERTEBRAL BODIES AND POSTERIOR ELEMENTS: No cervical spine compression fracture.  No posterior element fracture.  No destructive bone lesion.   SPINAL CANAL: Multilevel degenerative changes of the cervical spine with up to mild canal stenosis andmoderate neural foraminal narrowing, most prominent at C5-C6 and C6-C7   NECK SOFT TISSUES: Within normal limits.   LUNG APICES: Imaged portion of the lung apices are within normal limits.   SKULL BASE: Within normal limits.     THORACIC SPINE:   Trauma: No acute fracture.   Alignment: Within normal limits.   Vertebral Body Heights: The thoracic vertebral body heights are intact.   Intervertebral Discs:  The disc spaces are grossly preserved.   Degenerative change: There is no significant spinal canal stenosis. No high-grade neural foraminal narrowing.   Paraspinous Soft Tissues: Within normal limits.   8 mm right upper lobe nodule (series 2, image 44).     LUMBAR SPINE:   Trauma: No acute fracture.   Alignment: Within normal limits.   Vertebral Body Heights: The lumbar vertebral body heights are intact.   Intervertebral Discs:  The disc spaces are grossly preserved.   Degenerative change: Mild diffuse osteopenia. Multilevel degenerative changes of the lumbar spine with up to mild canal stenosis andmoderate neural foraminal narrowing, most prominent at L4-L5 and L5-S1   Paraspinous Soft Tissues: Within normal limits.         Brain: No acute intracranial hemorrhage, mass effect, or CT apparent acute infarct. Chronic microvascular ischemia and involutional changes.   Cervical spine: No acute fracture or traumatic malalignment. Multilevel degenerative changes of the cervical spine, most prominent at C5-C6 and C6-C7.    Thoracic spine: No acute fracture or traumatic malalignment. No significant degenerative changes of the thoracic spine. 8 mm right upper lobe pulmonary nodule.   Lumbar spine: No acute fracture or traumatic malalignment. Multilevel degenerative changes of the lumbar spine, most prominent at L4-L5 and L5-S1.   A solid non-calcified pulmonary nodule measuring 6-8 mm in the right upper lobe.Consider follow up non contrast chest CT at 6-12 months, then consider CT chest at 18-24 months (Braxton Cavanaugh et al., Guidelines for management of incidental pulmonary nodules detected on CT images: From the Fleischner Society 2017, Radiology. 2017 Jul;284 (1):228-243.) [email or web address]     Signed by: Xavier Perez 5/20/2025 10:04 AM Dictation workstation:   [id number]    CT thoracic spine wo IV contrast  Result Date: 5/20/2025  Interpreted By:  Xavier Perez, STUDY: CT HEAD WO IV CONTRAST; CT CERVICAL SPINE WO IV CONTRAST; CT THORACIC SPINE WO IV CONTRAST; CT LUMBAR SPINE RETROSPECTIVE RECONSTRUCTION PROTOCOL;  5/20/2025 9:08 am   INDICATION: Signs/Symptoms:fall on left side; Signs/Symptoms:fall flexed neck forward; Signs/Symptoms:difficulty walking; Signs/Symptoms:back pain.   COMPARISON: MRI brain 08/30/2024   ACCESSION NUMBER(S): [id number]; [id number]; [id number]; [id number]   ORDERING CLINICIAN: TYLER YOUNG   TECHNIQUE: Noncontrast axial CT scan of head and complete spine was performed.   FINDINGS: BRAIN:   Parenchyma: There is no intracranial hemorrhage. The grey-white differentiation is intact. There is no mass effect or midline shift. Patchy supratentorial hypodensity, nonspecific, but likely secondary to mild chronic microvascular ischemia.   CSF Spaces: Mild generalized volume loss, with concordant ventricular enlargement.   Extra-Axial Fluid: There is no extraaxial fluid collection.   Calvarium: The calvarium is unremarkable.   Paranasal sinuses: Mild paranasal sinus mucosal thickening.    Mastoids: Clear.   Orbits: Bilateral lens replacements.   Soft tissues: Unremarkable.   CERVICAL SPINE:   ALIGNMENT: Straightening of the cervical lordosis. Atlantoaxial interval is maintained. Vertebral body heights are within normal limits. Multilevel loss of disc spaces throughout the cervical spine.   VERTEBRAL BODIES AND POSTERIOR ELEMENTS: No cervical spine compression fracture.  No posterior element fracture.  No destructive bone lesion.   SPINAL CANAL: Multilevel degenerative changes of the cervical spine with up to mild canal stenosis andmoderate neural foraminal narrowing, most prominent at C5-C6 and C6-C7   NECK SOFT TISSUES: Within normal limits.   LUNG APICES: Imaged portion of the lung apices are within normal limits.   SKULL BASE: Within normal limits.     THORACIC SPINE:   Trauma: No acute fracture.   Alignment: Within normal limits.   Vertebral Body Heights: The thoracic vertebral body heights are intact.   Intervertebral Discs:  The disc spaces are grossly preserved.   Degenerative change: There is no significant spinal canal stenosis. No high-grade neural foraminal narrowing.   Paraspinous Soft Tissues: Within normal limits.   8 mm right upper lobe nodule (series 2, image 44).     LUMBAR SPINE:   Trauma: No acute fracture.   Alignment: Within normal limits.   Vertebral Body Heights: The lumbar vertebral body heights are intact.   Intervertebral Discs:  The disc spaces are grossly preserved.   Degenerative change: Mild diffuse osteopenia. Multilevel degenerative changes of the lumbar spine with up to mild canal stenosis andmoderate neural foraminal narrowing, most prominent at L4-L5 and L5-S1   Paraspinous Soft Tissues: Within normal limits.         Brain: No acute intracranial hemorrhage, mass effect, or CT apparent acute infarct. Chronic microvascular ischemia and involutional changes.   Cervical spine: No acute fracture or traumatic malalignment. Multilevel degenerative changes of the cervical  spine, most prominent at C5-C6 and C6-C7.   Thoracic spine: No acute fracture or traumatic malalignment. No significant degenerative changes of the thoracic spine. 8 mm right upper lobe pulmonary nodule.   Lumbar spine: No acute fracture or traumatic malalignment. Multilevel degenerative changes of the lumbar spine, most prominent at L4-L5 and L5-S1.   A solid non-calcified pulmonary nodule measuring 6-8 mm in the right upper lobe.Consider follow up non contrast chest CT at 6-12 months, then consider CT chest at 18-24 months (Braxton Cavanaugh et al., Guidelines for management of incidental pulmonary nodules detected on CT images: From the Fleischner Society 2017, Radiology. 2017 Jul;284 (1):228-243.) [email or web address]     Signed by: Xavier Perez 5/20/2025 10:04 AM Dictation workstation:   [id number]    XR pelvis 1-2 views  Result Date: 5/15/2025  EXAMINATION: XR PELVIS SINGLE VIEW 05/15/2025 09:30 AM CLINICAL HISTORY: s/p FFS ASSOCIATED DIAGNOSIS: ORDERING PROVIDER: MALKA MORA NOTE: COMPARISON: None FINDINGS: Limited evaluation due to poor penetration. Status post bilateral total hip replacement. No periprosthetic lucency or fracture of prosthesis. The pelvic ring is intact.  No acute fracture or dislocation is identified. There is no radiopaque foreign body. Lower lumbar spine spondylosis is present   IMPRESSION: No acute fracture or dislocation. MACRO: None I have personally reviewed the images and agree with the resident's interpretation.    XR chest 1 view  Result Date: 5/15/2025  EXAMINATION: XR CHEST AP OR PA 1 VIEW 05/15/2025 09:30 AM CLINICAL HISTORY: s/p FFS ASSOCIATED DIAGNOSIS: s/p FFS ORDERING PROVIDER: MALKA MORA NOTE: COMPARISON: XR CHEST AP OR PA 1 VIEW 6/22/2015 11:43 AM FINDINGS: Lines, tubes, and devices: A dual lead cardiac device is present with lead tips overlying the right atrial appendage and right ventricle Lungs and pleura: No focal pulmonary  consolidation, effusion or pneumothorax. Stable right upper lobe granuloma. Cardiomediastinal silhouette: The cardiomediastinal silhouette is prominent in size and likely exaggerated by AP technique. Atherosclerotic calcifications are present in the thoracic aorta. Musculoskeletal: Degenerative spurring within the thoracic spine. 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 IMPRESSION: No acute cardiopulmonary abnormality identified. MACRO: None I have personally reviewed the images and agree with the resident's interpretation.    CT cervical spine wo IV contrast  Result Date: 5/15/2025  EXAMINATION: CT C-SPINE W/O CONTRAST 05/15/2025 09:21 AM CLINICAL HISTORY: FFS + Xarelto + head strike ASSOCIATED DIAGNOSIS: FFS + Xarelto + head strike ORDERING PROVIDER: MALKA MCKINNEY TECHNOLOGISTS NOTE: COMPARISON: Cervical spine CT from 10/7/2020 TECHNIQUE: Thin isotropic axial images were obtained from the skull base to the upper thoracic spine without intravenous contrast. 2D sagittal and coronal reconstructions were obtained from the axial data. FINDINGS: Counting reference: Craniocervical junction. Anatomic variants: None. Vertebrae: No acute fracture or traumatic malalignment. No aggressive osseous lesions. Mild loss of usual cervical lordosis. Vertebral body heights are maintained. Multilevel degenerative disc space narrowing with predominantly anterior endplate spurring, greatest at C5-6. 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 Multilevel facet hypertrophy and uncovertebral spurring with varying degrees of bony neural foraminal stenosis, severe on the right at C3-4 and C4-5 Soft Tissues: No dorsal paraspinous, paravertebral, or prevertebral fluid collection. Atheromatous calcifications of the carotid bifurcations. No mass or focal consolidation in the included upper lungs. IMPRESSION: No acute cervical spine fracture or traumatic  malalignment. 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 MACRO: None    CT head wo IV contrast  Result Date: 5/15/2025  EXAMINATION: CT HEAD W/O CONTRAST 05/15/2025 09:21 AM CLINICAL HISTORY: FFS + Xarelto + head strike ASSOCIATED DIAGNOSIS: FFS + Xarelto + head strike ORDERING PROVIDER: MALKA MCKINNEY TECHNOLOGISTS NOTE: COMPARISON: Head CT from 2/20/2021 TECHNIQUE: Thin axial imaging of the head was performed without intravenous contrast. FINDINGS: No significant compression of the underlying parenchyma. No acute intraparenchymal hemorrhage or CT evidence of acute territorial infarction. Unchanged thin low-density subdural fluid collections over the frontal convexities, measuring up to 7 mm on the left. Ventricular caliber is commensurate with the mild degree of generalized brain parenchymal volume loss. Scattered hypodensities in the periventricular white matter are nonspecific although favor sequelae of mild chronic small vessel ischemia. Atheromatous calcifications of the carotid siphons. No focal abnormality of the skull base or calvarium. Linear metallic foreign body traverses the ventral left maxillary alveolus. The paranasal sinuses and tympanomastoid cavities are unopacified. IMPRESSION: Mild volume loss and unchanged thin low-density subdural fluid collections over the frontal convexities without significant mass effect. MACRO: None    Results for orders placed or performed during the hospital encounter of 05/20/25 (from the past 24 hours)   Vitamin B12   Result Value Ref Range    Vitamin B12 313 211 - 911 pg/mL   Folate   Result Value Ref Range    Folate, Serum 11.9 >5.0 ng/mL   Vitamin D 25-Hydroxy,Total (for eval of Vitamin D levels)   Result Value Ref Range    Vitamin D, 25-Hydroxy, Total 41 30 - 100 ng/mL   TSH with reflex to Free T4 if abnormal   Result Value Ref Range    Thyroid Stimulating  Hormone 0.27 (L) 0.44 - 3.98 mIU/L   Thyroxine, Free   Result Value Ref Range    Thyroxine, Free 0.72 0.61 - 1.12 ng/dL   Comprehensive Metabolic Panel   Result Value Ref Range    Glucose 84 74 - 99 mg/dL    Sodium 142 136 - 145 mmol/L    Potassium 3.5 3.5 - 5.3 mmol/L    Chloride 108 (H) 98 - 107 mmol/L    Bicarbonate 27 21 - 32 mmol/L    Anion Gap 11 10 - 20 mmol/L    Urea Nitrogen 18 6 - 23 mg/dL    Creatinine 0.76 0.50 - 1.30 mg/dL    eGFR >90 >60 mL/min/1.73m*2    Calcium 8.8 8.6 - 10.3 mg/dL    Albumin 3.7 3.4 - 5.0 g/dL    Alkaline Phosphatase 53 33 - 136 U/L    Total Protein 6.0 (L) 6.4 - 8.2 g/dL    AST 20 9 - 39 U/L    Bilirubin, Total 0.6 0.0 - 1.2 mg/dL    ALT 15 10 - 52 U/L   CBC   Result Value Ref Range    WBC 5.3 4.4 - 11.3 x10*3/uL    nRBC 0.0 0.0 - 0.0 /100 WBCs    RBC 3.17 (L) 4.50 - 5.90 x10*6/uL    Hemoglobin 11.7 (L) 13.5 - 17.5 g/dL    Hematocrit 34.5 (L) 41.0 - 52.0 %     (H) 80 - 100 fL    MCH 36.9 (H) 26.0 - 34.0 pg    MCHC 33.9 32.0 - 36.0 g/dL    RDW 15.2 (H) 11.5 - 14.5 %    Platelets 199 150 - 450 x10*3/uL     No EEG results found for the past 12 months                      Bradford Coma Scale  Best Eye Response: Spontaneous  Best Verbal Response: Oriented  Best Motor Response: Follows commands  Bradford Coma Scale Score: 15                                Assessment & Plan  Dizziness    Multiple falls    Difficulty walking    Lewy body dementia (Multi)    -Due to borderline positive orthostatic vital signs from a lying to sitting position (SBP ranges from 136-119), patient to receive a 1 L fluid bolus of normal saline 0.9% now, and to continue infusion at 100 cc an hour x 24 hours.  Check orthostatic vital signs in the morning.  Primary care to resume treatment of orthostatic hypotension in this patient.  -CT angiogram unremarkable for any acute stenosis; patient to continue Xarelto 20 mg p.o. daily, ASA 81 mg p.o. daily, and atorvastatin 40 mg p.o. daily for CVA  prophylaxis.  -Continue antiepileptics as prescribed  -Recommendations for needs during hospitalization and at discharge via PT and OT; due to recurring falls in the home setting, it is not recommended for patient to return home and instead for patient to be discharged to SNF for 24-hour care.  -Continue meclizine as needed to assist with dizziness.  - Continue promotion of lifestyle modifications, such as: Strict BP and glycemic control, dietary habit changes, incorporation of daily exercise regimen, adherence to all prescription/OTC medication schedules, attendance to all follow-up appointments, cessation from smoking if applicable, abstinence from alcohol and illicit drug use if applicable  -Patient to follow-up with PCP in 1 to 2 weeks postdischarge  -Patient to follow-up with his outpatient neurologist, Dr. Morin, as established.  He is to complete MRI of the brain, C, and L-spine without contrast and EMG in the outpatient setting prior to his next follow-up appointment.    Neurology to sign off at this time. Thank you for the opportunity to be a part of this patient's multidisciplinary treatment team.  If any additional questions or concerns arise, please do not hesitate to contact me or the on-call neurologist via [employer] Secure Chat.    I spent 30 minutes in the professional and overall care of this patient.      Sunshine Caraballo, APRN-CNP       [1] ALPRAZolam, 0.25 mg, oral, q AM  aspirin, 81 mg, oral, Daily  atorvastatin, 40 mg, oral, Nightly  cholecalciferol, 50 mcg, oral, Daily  donepezil, 10 mg, oral, q AM  lisinopril, 5 mg, oral, q AM  metoprolol tartrate, 100 mg, oral, q12h VALENTE  OXcarbazepine, 1,200 mg, oral, q AM  OXcarbazepine, 900 mg, oral, Nightly  PHENobarbital, 64.8 mg, oral, BID  phenytoin ER, 200 mg, oral, BID  psyllium, 1 packet, oral, Daily  rivaroxaban, 20 mg, oral, Daily with evening meal  sertraline, 50 mg, oral, BID  sodium chloride, 1,000 mL, intravenous, Once  [2] sodium chloride 0.9%,  100 mL/hr  [3] PRN medications: acetaminophen **OR** acetaminophen **OR** acetaminophen, meclizine

## 2025-05-23 LAB
ALBUMIN SERPL BCP-MCNC: 3.1 G/DL (ref 3.4–5)
ALP SERPL-CCNC: 51 U/L (ref 33–136)
ALT SERPL W P-5'-P-CCNC: 13 U/L (ref 10–52)
ANION GAP SERPL CALC-SCNC: 9 MMOL/L (ref 10–20)
AST SERPL W P-5'-P-CCNC: 15 U/L (ref 9–39)
BILIRUB SERPL-MCNC: 0.5 MG/DL (ref 0–1.2)
BUN SERPL-MCNC: 14 MG/DL (ref 6–23)
CALCIUM SERPL-MCNC: 8.4 MG/DL (ref 8.6–10.3)
CHLORIDE SERPL-SCNC: 106 MMOL/L (ref 98–107)
CO2 SERPL-SCNC: 26 MMOL/L (ref 21–32)
CREAT SERPL-MCNC: 0.7 MG/DL (ref 0.5–1.3)
EGFRCR SERPLBLD CKD-EPI 2021: >90 ML/MIN/1.73M*2
ERYTHROCYTE [DISTWIDTH] IN BLOOD BY AUTOMATED COUNT: 13.8 % (ref 11.5–14.5)
GLUCOSE SERPL-MCNC: 89 MG/DL (ref 74–99)
HCT VFR BLD AUTO: 30.7 % (ref 41–52)
HGB BLD-MCNC: 10.2 G/DL (ref 13.5–17.5)
IRON SATN MFR SERPL: 22 % (ref 25–45)
IRON SERPL-MCNC: 54 UG/DL (ref 35–150)
MCH RBC QN AUTO: 34.8 PG (ref 26–34)
MCHC RBC AUTO-ENTMCNC: 33.2 G/DL (ref 32–36)
MCV RBC AUTO: 105 FL (ref 80–100)
NRBC BLD-RTO: 0 /100 WBCS (ref 0–0)
PLATELET # BLD AUTO: 170 X10*3/UL (ref 150–450)
POTASSIUM SERPL-SCNC: 3.4 MMOL/L (ref 3.5–5.3)
PROT SERPL-MCNC: 5 G/DL (ref 6.4–8.2)
RBC # BLD AUTO: 2.93 X10*6/UL (ref 4.5–5.9)
SODIUM SERPL-SCNC: 138 MMOL/L (ref 136–145)
TIBC SERPL-MCNC: 242 UG/DL (ref 240–445)
UIBC SERPL-MCNC: 188 UG/DL (ref 110–370)
WBC # BLD AUTO: 5.1 X10*3/UL (ref 4.4–11.3)

## 2025-05-23 PROCEDURE — 1200000002 HC GENERAL ROOM WITH TELEMETRY DAILY

## 2025-05-23 PROCEDURE — 97116 GAIT TRAINING THERAPY: CPT | Mod: GP,CQ

## 2025-05-23 PROCEDURE — 2500000001 HC RX 250 WO HCPCS SELF ADMINISTERED DRUGS (ALT 637 FOR MEDICARE OP): Performed by: PHYSICIAN ASSISTANT

## 2025-05-23 PROCEDURE — 2500000002 HC RX 250 W HCPCS SELF ADMINISTERED DRUGS (ALT 637 FOR MEDICARE OP, ALT 636 FOR OP/ED): Performed by: PHYSICIAN ASSISTANT

## 2025-05-23 PROCEDURE — 97535 SELF CARE MNGMENT TRAINING: CPT | Mod: GP,CQ

## 2025-05-23 PROCEDURE — 36415 COLL VENOUS BLD VENIPUNCTURE: CPT | Performed by: INTERNAL MEDICINE

## 2025-05-23 PROCEDURE — 80053 COMPREHEN METABOLIC PANEL: CPT | Performed by: INTERNAL MEDICINE

## 2025-05-23 PROCEDURE — 85027 COMPLETE CBC AUTOMATED: CPT | Performed by: INTERNAL MEDICINE

## 2025-05-23 PROCEDURE — 83550 IRON BINDING TEST: CPT | Performed by: INTERNAL MEDICINE

## 2025-05-23 PROCEDURE — 97535 SELF CARE MNGMENT TRAINING: CPT | Mod: GO

## 2025-05-23 PROCEDURE — 2500000001 HC RX 250 WO HCPCS SELF ADMINISTERED DRUGS (ALT 637 FOR MEDICARE OP): Performed by: INTERNAL MEDICINE

## 2025-05-23 RX ORDER — POTASSIUM CHLORIDE 1.5 G/1.58G
40 POWDER, FOR SOLUTION ORAL 2 TIMES DAILY
Status: COMPLETED | OUTPATIENT
Start: 2025-05-23 | End: 2025-05-23

## 2025-05-23 RX ADMIN — EXTENDED PHENYTOIN SODIUM 200 MG: 100 CAPSULE, EXTENDED RELEASE ORAL at 05:49

## 2025-05-23 RX ADMIN — METOPROLOL TARTRATE 100 MG: 100 TABLET, FILM COATED ORAL at 05:50

## 2025-05-23 RX ADMIN — PHENOBARBITAL 64.8 MG: 32.4 TABLET ORAL at 05:50

## 2025-05-23 RX ADMIN — SERTRALINE HYDROCHLORIDE 50 MG: 50 TABLET ORAL at 05:50

## 2025-05-23 RX ADMIN — EXTENDED PHENYTOIN SODIUM 200 MG: 100 CAPSULE, EXTENDED RELEASE ORAL at 17:48

## 2025-05-23 RX ADMIN — METOPROLOL TARTRATE 100 MG: 100 TABLET, FILM COATED ORAL at 17:49

## 2025-05-23 RX ADMIN — OXCARBAZEPINE 1200 MG: 150 TABLET, FILM COATED ORAL at 05:49

## 2025-05-23 RX ADMIN — SERTRALINE HYDROCHLORIDE 50 MG: 50 TABLET ORAL at 17:49

## 2025-05-23 RX ADMIN — ACETAMINOPHEN 650 MG: 325 TABLET ORAL at 08:12

## 2025-05-23 RX ADMIN — ASPIRIN 81 MG CHEWABLE TABLET 81 MG: 81 TABLET CHEWABLE at 05:50

## 2025-05-23 RX ADMIN — DONEPEZIL HYDROCHLORIDE 10 MG: 10 TABLET ORAL at 05:50

## 2025-05-23 RX ADMIN — OXCARBAZEPINE 900 MG: 150 TABLET, FILM COATED ORAL at 17:48

## 2025-05-23 RX ADMIN — PHENOBARBITAL 64.8 MG: 32.4 TABLET ORAL at 17:49

## 2025-05-23 RX ADMIN — ACETAMINOPHEN 650 MG: 325 TABLET ORAL at 16:22

## 2025-05-23 RX ADMIN — ALPRAZOLAM 0.25 MG: 0.25 TABLET ORAL at 17:49

## 2025-05-23 RX ADMIN — Medication 50 MCG: at 05:50

## 2025-05-23 RX ADMIN — POTASSIUM CHLORIDE 40 MEQ: 1.5 POWDER, FOR SOLUTION ORAL at 12:18

## 2025-05-23 RX ADMIN — RIVAROXABAN 20 MG: 20 TABLET, FILM COATED ORAL at 17:50

## 2025-05-23 RX ADMIN — ATORVASTATIN CALCIUM 40 MG: 40 TABLET, FILM COATED ORAL at 17:49

## 2025-05-23 ASSESSMENT — COGNITIVE AND FUNCTIONAL STATUS - GENERAL
TOILETING: A LOT
WALKING IN HOSPITAL ROOM: A LITTLE
MOVING TO AND FROM BED TO CHAIR: A LITTLE
TURNING FROM BACK TO SIDE WHILE IN FLAT BAD: A LITTLE
STANDING UP FROM CHAIR USING ARMS: A LITTLE
TURNING FROM BACK TO SIDE WHILE IN FLAT BAD: A LITTLE
HELP NEEDED FOR BATHING: A LITTLE
MOVING TO AND FROM BED TO CHAIR: A LITTLE
DAILY ACTIVITIY SCORE: 19
STANDING UP FROM CHAIR USING ARMS: A LITTLE
MOBILITY SCORE: 18
MOBILITY SCORE: 16
DAILY ACTIVITIY SCORE: 16
TOILETING: A LITTLE
WALKING IN HOSPITAL ROOM: A LITTLE
CLIMB 3 TO 5 STEPS WITH RAILING: A LITTLE
PERSONAL GROOMING: A LITTLE
TURNING FROM BACK TO SIDE WHILE IN FLAT BAD: A LITTLE
TURNING FROM BACK TO SIDE WHILE IN FLAT BAD: A LITTLE
DAILY ACTIVITIY SCORE: 19
MOVING TO AND FROM BED TO CHAIR: A LITTLE
MOBILITY SCORE: 18
TOILETING: A LITTLE
HELP NEEDED FOR BATHING: A LITTLE
DRESSING REGULAR LOWER BODY CLOTHING: A LITTLE
PERSONAL GROOMING: A LITTLE
DRESSING REGULAR UPPER BODY CLOTHING: A LITTLE
TOILETING: A LITTLE
HELP NEEDED FOR BATHING: A LOT
MOVING FROM LYING ON BACK TO SITTING ON SIDE OF FLAT BED WITH BEDRAILS: A LITTLE
DRESSING REGULAR UPPER BODY CLOTHING: A LITTLE
PERSONAL GROOMING: A LITTLE
STANDING UP FROM CHAIR USING ARMS: A LITTLE
CLIMB 3 TO 5 STEPS WITH RAILING: A LITTLE
DRESSING REGULAR UPPER BODY CLOTHING: A LITTLE
CLIMB 3 TO 5 STEPS WITH RAILING: TOTAL
DRESSING REGULAR LOWER BODY CLOTHING: A LOT
DRESSING REGULAR LOWER BODY CLOTHING: A LITTLE
MOBILITY SCORE: 18
WALKING IN HOSPITAL ROOM: A LITTLE
MOVING TO AND FROM BED TO CHAIR: A LITTLE
DRESSING REGULAR UPPER BODY CLOTHING: A LITTLE
HELP NEEDED FOR BATHING: A LITTLE
MOVING FROM LYING ON BACK TO SITTING ON SIDE OF FLAT BED WITH BEDRAILS: A LITTLE
MOVING FROM LYING ON BACK TO SITTING ON SIDE OF FLAT BED WITH BEDRAILS: A LITTLE
PERSONAL GROOMING: A LITTLE
DAILY ACTIVITIY SCORE: 19
CLIMB 3 TO 5 STEPS WITH RAILING: A LITTLE
DRESSING REGULAR LOWER BODY CLOTHING: A LITTLE
STANDING UP FROM CHAIR USING ARMS: A LITTLE
WALKING IN HOSPITAL ROOM: A LITTLE
MOVING FROM LYING ON BACK TO SITTING ON SIDE OF FLAT BED WITH BEDRAILS: A LITTLE

## 2025-05-23 ASSESSMENT — PAIN - FUNCTIONAL ASSESSMENT
PAIN_FUNCTIONAL_ASSESSMENT: 0-10

## 2025-05-23 ASSESSMENT — PAIN SCALES - GENERAL
PAINLEVEL_OUTOF10: 0 - NO PAIN
PAINLEVEL_OUTOF10: 3
PAINLEVEL_OUTOF10: 0 - NO PAIN
PAINLEVEL_OUTOF10: 3
PAINLEVEL_OUTOF10: 0 - NO PAIN

## 2025-05-23 ASSESSMENT — ACTIVITIES OF DAILY LIVING (ADL): HOME_MANAGEMENT_TIME_ENTRY: 9

## 2025-05-23 ASSESSMENT — PAIN DESCRIPTION - LOCATION: LOCATION: HEAD

## 2025-05-23 NOTE — PROGRESS NOTES
Physical Therapy    Physical Therapy Treatment    Patient Name: Jose Luis Dejesus  MRN: [id number]  Department: [facility]  Room: [address] [place] [address]  Today's Date: 5/23/2025  Time Calculation  Start Time: 1305  Stop Time: 1330  Time Calculation (min): 25 min         Assessment/Plan   PT Assessment  End of Session Communication: Bedside nurse  End of Session Patient Position: Up in chair, Alarm on     PT Plan  Treatment/Interventions: Bed mobility, Transfer training, Gait training, Balance training, Strengthening, Endurance training, Range of motion, Therapeutic exercise, Therapeutic activity, Home exercise program, Positioning  PT Plan: Ongoing PT  PT Frequency: 3 times per week  PT Discharge Recommendations: Moderate intensity level of continued care  PT - OK to Discharge: Yes      General Visit Information:   General  Prior to Session Communication: Bedside nurse  Patient Position Received: Bed, 3 rail up, Alarm off, not on at start of session  General Comment:  (pt pleasant and agreeable to participate in therapy)    Subjective   Precautions:  Precautions  Medical Precautions: Fall precautions  Precautions Comment: tele            Objective   Pain:  Pain Assessment  Pain Assessment:  (no pain complaints at this time)    Cognition:  Cognition  Overall Cognitive Status: Within Functional Limits     Treatments:  Bed Mobility  Bed Mobility:  (sup > sit with SBA; HOB elevated)    Ambulation/Gait Training  Ambulation/Gait Training Performed:  (pt initially standing up impulsively w/o warning and pivoting bed > chair with CGA, no AD.  after seated rest break, pt ambulates in small loop around room, ~10 ft, with FWW and CGA.  cuing for safe walker management as pt tends to push too far in front.  pt also completes several trials static stance at FWW with SBA.)    Transfers  Transfer:  (sit <> stand multiple trials with CGA/SBA; trials completed both with FWW and without AD.  cuing for safe hand placement/ sequencing with FWW.)    Outcome  Measures:  [facility] Basic Mobility  Turning from your back to your side while in a flat bed without using bedrails: A little  Moving from lying on your back to sitting on the side of a flat bed without using bedrails: A little  Moving to and from bed to chair (including a wheelchair): A little  Standing up from a chair using your arms (e.g. wheelchair or bedside chair): A little  To walk in hospital room: A little  Climbing 3-5 steps with railing: Total  Basic Mobility - Total Score: 16    Education Documentation  Precautions, taught by Demetra Ballard PTA at 5/23/2025  2:51 PM.  Learner: Patient  Readiness: Acceptance  Method: Explanation  Response: Verbalizes Understanding, Needs Reinforcement    Mobility Training, taught by Demetra Ballard PTA at 5/23/2025  2:51 PM.  Learner: Patient  Readiness: Acceptance  Method: Explanation  Response: Verbalizes Understanding, Needs Reinforcement    Education Comments  No comments found.        EDUCATION:       Encounter Problems       Encounter Problems (Active)       PT Problem       PT Goal 1 STG - Pt will transition supine <> sitting with Ervin  (Progressing)       Start:  05/21/25    Expected End:  06/04/25            PT Goal 2 STG - Pt will transfer STS with Ervin  (Progressing)       Start:  05/21/25    Expected End:  06/04/25            PT Goal 3 STG - Pt will amb 50' using FWW with Ervin  (Progressing)       Start:  05/21/25    Expected End:  06/04/25            PT Goal 4 STG - Pt will perform a B LE ther ex program of 2-3 sets of 10  (Progressing)       Start:  05/21/25    Expected End:  06/04/25

## 2025-05-23 NOTE — PROGRESS NOTES
Occupational Therapy    OT Treatment    Patient Name: Jose Luis Dejesus  MRN: [id number]  Department: [facility]  Room: [address] [place] [address]  Today's Date: 5/23/2025  Time Calculation  Start Time: 1428  Stop Time: 1437  Time Calculation (min): 9 min        Assessment:  End of Session Communication: Bedside nurse  End of Session Patient Position:  (seated on BSC, RN aware of patient status)     Plan:  Treatment Interventions: ADL retraining, Functional transfer training, Patient/family training, Equipment evaluation/education, Compensatory technique education, Endurance training (energy conservation / diaphragmatic breathing techniques training)  OT Frequency: 3 times per week  OT Discharge Recommendations: Moderate intensity level of continued care  OT - OK to Discharge: Yes  Treatment Interventions: ADL retraining, Functional transfer training, Patient/family training, Equipment evaluation/education, Compensatory technique education, Endurance training (energy conservation / diaphragmatic breathing techniques training)    Subjective   OT Visit Info:  OT Received On: 05/23/25  General Visit Info:  General  Prior to Session Communication: Bedside nurse  Patient Position Received: Up in chair, Alarm on  General Comment: patient pleasant and cooperative throughout session  Precautions:  Precautions Comment: Fall Precautions    Pain:  Pain Assessment  Pain Assessment: 0-10  0-10 (Numeric) Pain Score: 0 - No pain    Objective    Cognition:  Cognition  Overall Cognitive Status: Within Functional Limits    Activities of Daily Living:    Toileting  Toileting Comments: patient in standing for clothing management, requesting increased time for toileting and will complete hygiene following OT session    Bed Mobility/Transfers:    Transfers  Transfer: Yes (sit <> stand: completing from arm chair to standing and taking 3-4 steps from recliner to BSC with increased time and CGA for steadying)    Outcome Measures:[facility] Daily Activity  Putting on and  taking off regular lower body clothing: A lot  Bathing (including washing, rinsing, drying): A lot  Putting on and taking off regular upper body clothing: A little  Toileting, which includes using toilet, bedpan or urinal: A lot  Taking care of personal grooming such as brushing teeth: A little  Eating Meals: None  Daily Activity - Total Score: 16      Education Documentation  Body Mechanics, taught by Almita Staton OT at 5/23/2025  2:56 PM.  Learner: Patient  Readiness: Acceptance  Method: Explanation  Response: Verbalizes Understanding, Needs Reinforcement    Precautions, taught by Almita Staton OT at 5/23/2025  2:56 PM.  Learner: Patient  Readiness: Acceptance  Method: Explanation  Response: Verbalizes Understanding, Needs Reinforcement    ADL Training, taught by Almita Staton OT at 5/23/2025  2:56 PM.  Learner: Patient  Readiness: Acceptance  Method: Explanation  Response: Verbalizes Understanding, Needs Reinforcement    Education Comments  No comments found.      Goals:  Encounter Problems       Encounter Problems (Active)       OT Goals       Patient will complete upper and lower body bathing/dressing; toileting with supervision using assistive techniques/adaptive equipment as needed  (Progressing)       Start:  05/21/25    Expected End:  06/04/25            Patient will perform bed mobility and functional transfers safely with supervision:  bed, chair, commode using DME as needed  (Progressing)       Start:  05/21/25    Expected End:  06/04/25            Patient will apply energy conservation/diaphragmatic breathing techniques to ADL's and functional transfers with minimal cues  (Progressing)       Start:  05/21/25    Expected End:  06/04/25            Patient will tolerate standing for 3 mins. in prep during ADL's and functional transfers/mobility  (Progressing)       Start:  05/21/25    Expected End:  06/04/25                              [No Acute Distress] : no acute distress [Normal Sclera/Conjunctiva] : normal sclera/conjunctiva [Normal Outer Ear/Nose] : the outer ears and nose were normal in appearance [No JVD] : no jugular venous distention [Normal] : normal rate, regular rhythm, normal S1 and S2 and no murmur heard [No Edema] : there was no peripheral edema [Soft] : abdomen soft [Non Tender] : non-tender [Normal Bowel Sounds] : normal bowel sounds [Normal Anterior Cervical Nodes] : no anterior cervical lymphadenopathy [No Joint Swelling] : no joint swelling [Coordination Grossly Intact] : coordination grossly intact [No Focal Deficits] : no focal deficits [Alert and Oriented x3] : oriented to person, place, and time

## 2025-05-23 NOTE — DOCUMENTATION CLARIFICATION NOTE
"    PATIENT:               HUNTER KOCH  ACCT #:                  [id number]  MRN:                       [id number]  :                       1950  ADMIT DATE:       2025 7:44 AM  DISCH DATE:  RESPONDING PROVIDER #:        [id number]          PROVIDER RESPONSE TEXT:    Syncope due to Neurogenic Orthostatic Hypotension    CDI QUERY TEXT:    Clarification    Instruction:    Based on your assessment of the patient and the clinical information, please provide the requested documentation by clicking on the appropriate radio button and enter any additional information if prompted.    Question: Please clarify etiology of syncope after workup    When answering this query, please exercise your independent professional judgment. The fact that a question is being asked, does not imply that any particular answer is desired or expected.    The patient's clinical indicators include:  Clinical Information: 74 yo presenting after a fall    Clinical Indicators: H and P  \"Has happened multiple times in the past already, reports 3-4 falls in the past month secondary to dizziness and generally feeling weak in his knees.  Patient states the dizziness is mainly when he stands up, however sometimes it can come upon intermittently even if he has sitting\"    25  Dr. Leonard  \"Orthostatic dizziness, autonomic instability\"    25 Neurology PN  CT angio head and neck without any acute findings of significant stenosis, ECHO EF 66%, Reversible causes of dementia labs are unremarkable, \"Lastly, orthostatic vital signs do appear to be borderline positive from a lying to sitting position, ranging from 136 systolically to 119\"    25 Dr. Lopez  \"Continues to feel dizzy, orthostatic vitals are borderline positive. discontinue lisinopril and reassess orthostatic vitals in the AM\"    Treatment:  Neurology consult, Cardiology consult, imaging, lab monitoring, PT, OT, outpatient follow-up, IV NS bolus 1000 mls, permissive HTN, " discontinue lisinopril    Risk Factors: 74 yo with history of seizures, autonomic instability and Lewy body dementia  Options provided:  -- Syncope due to Neurogenic Orthostatic Hypotension  -- Syncope due to orthostatic hypotension  -- Syncope due to dehydration  -- Syncope due to other, Please specify additional information below  -- Other - I will add my own diagnosis  -- Refer to Clinical Documentation Reviewer    Query created by: Ivania Miles on 5/23/2025 7:57 AM      Electronically signed by:  CARMEN FISHMAN MD 5/23/2025 1:02 PM

## 2025-05-23 NOTE — CARE PLAN
Problem: Safety - Adult  Goal: Free from fall injury  Outcome: Progressing  Flowsheets (Taken 5/23/2025 0246)  Free from fall injury: Instruct family/caregiver on patient safety     Problem: Discharge Planning  Goal: Discharge to home or other facility with appropriate resources  Outcome: Progressing  Flowsheets (Taken 5/23/2025 0246)  Discharge to home or other facility with appropriate resources: Identify barriers to discharge with patient and caregiver     Problem: Chronic Conditions and Co-morbidities  Goal: Patient's chronic conditions and co-morbidity symptoms are monitored and maintained or improved  Outcome: Progressing  Flowsheets (Taken 5/23/2025 0246)  Care Plan - Patient's Chronic Conditions and Co-Morbidity Symptoms are Monitored and Maintained or Improved: Monitor and assess patient's chronic conditions and comorbid symptoms for stability, deterioration, or improvement     Problem: Nutrition  Goal: Nutrient intake appropriate for maintaining nutritional needs  Outcome: Progressing     Problem: Fall/Injury  Goal: Not fall by end of shift  Outcome: Progressing       The clinical goals for the shift include maintain comfort and safety

## 2025-05-23 NOTE — PROGRESS NOTES
Hunter Dejesus is a 75 y.o. male on day 1 of admission presenting with Dizziness.      Subjective   5/23: Patient examined at bedside. Strict intake and output, fall precautions. Hypokalemia overnight, potassium replaced. Hemoglobin down @ 10.2 from 11.7. Iron TIBC ordered. Will continue with orthostatic vitals, lisinopril discontinued, less dizziness today per patient, will continue to monitor overnight, ambulate patient with PT,  and reassess orthostatic vitals in the AM.       Objective     Last Recorded Vitals  /56   Pulse 61   Temp 36.2 °C (97.2 °F)   Resp 18   Wt 125 kg (275 lb)   SpO2 98%   Intake/Output last 3 Shifts:    Intake/Output Summary (Last 24 hours) at 5/23/2025 0915  Last data filed at 5/23/2025 0424  Gross per 24 hour   Intake 2000 ml   Output 500 ml   Net 1500 ml       Admission Weight  Weight: 125 kg (275 lb) (05/20/25 0748)    Daily Weight  05/20/25 : 125 kg (275 lb)    Image Results  Vascular US Carotid Artery Duplex Bilateral             [place] [id number]  Tel [phone] and Fax [phone]       Vascular Lab Report  VASC US CAROTID ARTERY DUPLEX BILATERAL       Patient Name:      HUNTER DEJESUS          Reading Physician:  [id number] Jacey Sorto MD, RPVI  Study Date:        5/20/2025            Ordering Physician: [id number] GIANCARLO PASCUAL  MRN/PID:           [id number]             Technologist:       Mira Heredia T  Accession#:        [id number]         Technologist 2:  Date of Birth/Age: 1950 / 75 years Encounter#:         [id number]  Gender:            M  Admission Status:  Emergency            Location Performed: [facility]       Diagnosis/ICD: Dizziness and giddiness-R42  CPT Codes:     30763 Cerebrovascular Carotid Duplex scan complete       CONCLUSIONS:  Right Carotid: Findings are consistent with less  than 50% stenosis of the right proximal internal carotid artery. Laminar flow seen by color Doppler. Right external carotid artery appears patent with no evidence of stenosis. No evidence of hemodynamically significant stenosis of the right common carotid artery. The right vertebral artery is patent with antegrade flow. No evidence of hemodynamically significant stenosis in the right subclavian artery.  Left Carotid: Findings are consistent with less than 50% stenosis of the left proximal internal carotid artery. Laminar flow seen by color Doppler. Left external carotid artery appears patent with no evidence of stenosis. No evidence of hemodynamically significant stenosis of the left common carotid artery. The left vertebral artery is patent with antegrade flow. No evidence of hemodynamically significant stenosis in the left subclavian artery.     Imaging & Doppler Findings:  Right Plaque Morph: The proximal right internal carotid artery demonstrates heterogenous plaque. The proximal right external carotid artery demonstrates heterogenous plaque. The distal right common carotid artery demonstrates heterogenous plaque.  Left Plaque Morph: The proximal left internal carotid artery demonstrates heterogenous plaque. The distal left common carotid artery demonstrates heterogenous plaque.      Right                        Left    PSV      EDV                PSV      EDV  89 cm/s            CCA P    97 cm/s  68 cm/s            CCA D    81 cm/s  68 cm/s  16 cm/s   ICA P    84 cm/s  20 cm/s  79 cm/s  21 cm/s   ICA M    74 cm/s  17 cm/s  83 cm/s  18 cm/s   ICA D    88 cm/s  24 cm/s  132 cm/s            ECA     101 cm/s  49 cm/s  11 cm/s Vertebral  69 cm/s  17 cm/s  206 cm/s         Subclavian 185 cm/s                  Right Left  ICA/CCA Ratio  1.0  1.0          [id number] BABITA Curran MD  Electronically signed by [id number] BABITA Curran MD on 5/22/2025 at 12:02:53 AM       ** Final **      Physical Exam    General: in no  acute distress  Eyes: PERRLA   HENT: Normo-cephalic, atraumatic.   CV: Regular rate, regular rhythm.   Resp: Breathing non-labored,  diminished to auscultation bilaterally  GI: BS x4   : monitor intake and output  MSK/Extremities: No gross bony deformities. PT/OT on the case  Skin: Warm. Appropriate color, continue offloading  Neuro:  Face symmetric.   Psych: returning to baseline, improved     10 point ROS negative unless otherwise noted in HPI    Patient fully evaluated 5/23  for    Problem List Items Addressed This Visit       * (Principal) Dizziness - Primary    Relevant Orders    Phenytoin level, total (dilantin) (Completed)    Oxcarbazepine level (Completed)    Phenobarbital level (Completed)    Urinalysis with Reflex Culture and Microscopic (Completed)    CT abdomen pelvis wo IV contrast (Completed)    CT cervical spine wo IV contrast (Completed)    CBC and Auto Differential (Completed)    Magnesium (Completed)    Comprehensive metabolic panel (Completed)    CT head wo IV contrast (Completed)    CT lumbar spine retrospective reconstruction protocol (Completed)    CT thoracic spine wo IV contrast (Completed)    Urinalysis Microscopic (Completed)    Vascular US Carotid Artery Duplex Bilateral (Completed)    Transthoracic Echo Complete (Completed)    Difficulty walking    Relevant Orders    Phenytoin level, total (dilantin) (Completed)    Oxcarbazepine level (Completed)    Phenobarbital level (Completed)    Urinalysis with Reflex Culture and Microscopic (Completed)    CT abdomen pelvis wo IV contrast (Completed)    CT cervical spine wo IV contrast (Completed)    CBC and Auto Differential (Completed)    Magnesium (Completed)    Comprehensive metabolic panel (Completed)    CT head wo IV contrast (Completed)    CT lumbar spine retrospective reconstruction protocol (Completed)    CT thoracic spine wo IV contrast (Completed)    Urinalysis Microscopic (Completed)    Vascular US Carotid Artery Duplex Bilateral  (Completed)    Transthoracic Echo Complete (Completed)     Other Visit Diagnoses         Drug-induced dizziness        Relevant Orders    Transthoracic Echo Complete (Completed)      Encounter for other specified special examinations          Encounter for other preprocedural examination              Patient seen resting in bed with head of bed elevated, no s/s or c/o acute difficulties at this time.  Vital signs for last 24 hours Temp:  [35.1 °C (95.2 °F)-36.4 °C (97.5 °F)] 36.2 °C (97.2 °F)  Heart Rate:  [60-71] 61  Resp:  [17-20] 18  BP: (112-137)/(56-63) 118/56    No intake/output data recorded.  Patient still requiring frequent cardiac and SPO2 monitoring. Continue aggressive pulmonary hygiene and oral hygiene. Off loading as tolerated for skin integrity. Medications and labs reviewed-   Results for orders placed or performed during the hospital encounter of 05/20/25 (from the past 24 hours)   CBC   Result Value Ref Range    WBC 5.1 4.4 - 11.3 x10*3/uL    nRBC 0.0 0.0 - 0.0 /100 WBCs    RBC 2.93 (L) 4.50 - 5.90 x10*6/uL    Hemoglobin 10.2 (L) 13.5 - 17.5 g/dL    Hematocrit 30.7 (L) 41.0 - 52.0 %     (H) 80 - 100 fL    MCH 34.8 (H) 26.0 - 34.0 pg    MCHC 33.2 32.0 - 36.0 g/dL    RDW 13.8 11.5 - 14.5 %    Platelets 170 150 - 450 x10*3/uL   Comprehensive Metabolic Panel   Result Value Ref Range    Glucose 89 74 - 99 mg/dL    Sodium 138 136 - 145 mmol/L    Potassium 3.4 (L) 3.5 - 5.3 mmol/L    Chloride 106 98 - 107 mmol/L    Bicarbonate 26 21 - 32 mmol/L    Anion Gap 9 (L) 10 - 20 mmol/L    Urea Nitrogen 14 6 - 23 mg/dL    Creatinine 0.70 0.50 - 1.30 mg/dL    eGFR >90 >60 mL/min/1.73m*2    Calcium 8.4 (L) 8.6 - 10.3 mg/dL    Albumin 3.1 (L) 3.4 - 5.0 g/dL    Alkaline Phosphatase 51 33 - 136 U/L    Total Protein 5.0 (L) 6.4 - 8.2 g/dL    AST 15 9 - 39 U/L    Bilirubin, Total 0.5 0.0 - 1.2 mg/dL    ALT 13 10 - 52 U/L      Patient recently received an antibiotic (last 12 hours)       None           Plan  discussed with interdisciplinary team, continue current and repeat labs in the AM.       Discharge planning discussed with patient and care team. Therapy evaluations ordered.   [facility]- [facility]  Anticipate - Rehab     Patient aware and agreeable to current plan, continue plan as above.     I spent a total of 60 minutes on the date of the service which included preparing to see the patient, face-to-face patient care, completing clinical documentation, obtaining and/or reviewing separately obtained history, performing a medically appropriate examination, counseling and educating the patient/family/caregiver, ordering medications, tests, or procedures, communicating with other HCPs (not separately reported), independently interpreting results (not separately reported), communicating results to the patient/family/caregiver, and care coordination (not separately reported).                       Assessment & Plan  Dizziness    Multiple falls    Difficulty walking    Lewy body dementia (Multi)    HTN (hypertension)    Pulmonary nodule             SHARRON Mooney-S

## 2025-05-23 NOTE — PROGRESS NOTES
05/23/25 1138   Discharge Planning   Living Arrangements Spouse/significant other   Support Systems Spouse/significant other   Expected Discharge Disposition SNF   Does the patient need discharge transport arranged? Yes   RoundTrip coordination needed? Yes     I revisited patient's bedside to follow up on facility of choice and patient verbalized preference for [facility]; precert started.  Kate LEWIS TCC

## 2025-05-24 LAB
ALBUMIN SERPL BCP-MCNC: 3.4 G/DL (ref 3.4–5)
ALP SERPL-CCNC: 57 U/L (ref 33–136)
ALT SERPL W P-5'-P-CCNC: 13 U/L (ref 10–52)
ANION GAP SERPL CALC-SCNC: 14 MMOL/L (ref 10–20)
AST SERPL W P-5'-P-CCNC: 16 U/L (ref 9–39)
BASOPHILS # BLD AUTO: 0.04 X10*3/UL (ref 0–0.1)
BASOPHILS NFR BLD AUTO: 0.7 %
BILIRUB SERPL-MCNC: 0.5 MG/DL (ref 0–1.2)
BUN SERPL-MCNC: 11 MG/DL (ref 6–23)
CALCIUM SERPL-MCNC: 8.8 MG/DL (ref 8.6–10.3)
CHLORIDE SERPL-SCNC: 105 MMOL/L (ref 98–107)
CO2 SERPL-SCNC: 25 MMOL/L (ref 21–32)
CREAT SERPL-MCNC: 0.68 MG/DL (ref 0.5–1.3)
EGFRCR SERPLBLD CKD-EPI 2021: >90 ML/MIN/1.73M*2
EOSINOPHIL # BLD AUTO: 0.39 X10*3/UL (ref 0–0.4)
EOSINOPHIL NFR BLD AUTO: 7.1 %
ERYTHROCYTE [DISTWIDTH] IN BLOOD BY AUTOMATED COUNT: 14 % (ref 11.5–14.5)
GLUCOSE SERPL-MCNC: 81 MG/DL (ref 74–99)
HCT VFR BLD AUTO: 33.6 % (ref 41–52)
HGB BLD-MCNC: 11.6 G/DL (ref 13.5–17.5)
IMM GRANULOCYTES # BLD AUTO: 0.02 X10*3/UL (ref 0–0.5)
IMM GRANULOCYTES NFR BLD AUTO: 0.4 % (ref 0–0.9)
LYMPHOCYTES # BLD AUTO: 1.21 X10*3/UL (ref 0.8–3)
LYMPHOCYTES NFR BLD AUTO: 21.9 %
MCH RBC QN AUTO: 36.7 PG (ref 26–34)
MCHC RBC AUTO-ENTMCNC: 34.5 G/DL (ref 32–36)
MCV RBC AUTO: 106 FL (ref 80–100)
MONOCYTES # BLD AUTO: 0.51 X10*3/UL (ref 0.05–0.8)
MONOCYTES NFR BLD AUTO: 9.2 %
NEUTROPHILS # BLD AUTO: 3.36 X10*3/UL (ref 1.6–5.5)
NEUTROPHILS NFR BLD AUTO: 60.7 %
NRBC BLD-RTO: 0 /100 WBCS (ref 0–0)
PLATELET # BLD AUTO: 198 X10*3/UL (ref 150–450)
POTASSIUM SERPL-SCNC: 3.7 MMOL/L (ref 3.5–5.3)
PROT SERPL-MCNC: 5.9 G/DL (ref 6.4–8.2)
RBC # BLD AUTO: 3.16 X10*6/UL (ref 4.5–5.9)
SODIUM SERPL-SCNC: 140 MMOL/L (ref 136–145)
WBC # BLD AUTO: 5.5 X10*3/UL (ref 4.4–11.3)

## 2025-05-24 PROCEDURE — 1200000002 HC GENERAL ROOM WITH TELEMETRY DAILY

## 2025-05-24 PROCEDURE — 85025 COMPLETE CBC W/AUTO DIFF WBC: CPT | Performed by: INTERNAL MEDICINE

## 2025-05-24 PROCEDURE — 2500000001 HC RX 250 WO HCPCS SELF ADMINISTERED DRUGS (ALT 637 FOR MEDICARE OP): Performed by: PHYSICIAN ASSISTANT

## 2025-05-24 PROCEDURE — 2500000002 HC RX 250 W HCPCS SELF ADMINISTERED DRUGS (ALT 637 FOR MEDICARE OP, ALT 636 FOR OP/ED): Performed by: PHYSICIAN ASSISTANT

## 2025-05-24 PROCEDURE — 80053 COMPREHEN METABOLIC PANEL: CPT | Performed by: INTERNAL MEDICINE

## 2025-05-24 PROCEDURE — 2500000001 HC RX 250 WO HCPCS SELF ADMINISTERED DRUGS (ALT 637 FOR MEDICARE OP): Performed by: INTERNAL MEDICINE

## 2025-05-24 PROCEDURE — 36415 COLL VENOUS BLD VENIPUNCTURE: CPT | Performed by: INTERNAL MEDICINE

## 2025-05-24 PROCEDURE — 2500000004 HC RX 250 GENERAL PHARMACY W/ HCPCS (ALT 636 FOR OP/ED): Performed by: INTERNAL MEDICINE

## 2025-05-24 RX ORDER — SCOPOLAMINE 1 MG/3D
1 PATCH, EXTENDED RELEASE TRANSDERMAL
Status: DISCONTINUED | OUTPATIENT
Start: 2025-05-24 | End: 2025-05-28 | Stop reason: HOSPADM

## 2025-05-24 RX ORDER — ACETAMINOPHEN 325 MG/1
650 TABLET ORAL 2 TIMES DAILY
Status: DISCONTINUED | OUTPATIENT
Start: 2025-05-24 | End: 2025-05-25

## 2025-05-24 RX ADMIN — DONEPEZIL HYDROCHLORIDE 10 MG: 10 TABLET ORAL at 05:18

## 2025-05-24 RX ADMIN — ALPRAZOLAM 0.25 MG: 0.25 TABLET ORAL at 17:49

## 2025-05-24 RX ADMIN — EXTENDED PHENYTOIN SODIUM 200 MG: 100 CAPSULE, EXTENDED RELEASE ORAL at 17:50

## 2025-05-24 RX ADMIN — RIVAROXABAN 20 MG: 20 TABLET, FILM COATED ORAL at 17:49

## 2025-05-24 RX ADMIN — SCOPOLAMINE 1 PATCH: 1.5 PATCH, EXTENDED RELEASE TRANSDERMAL at 15:28

## 2025-05-24 RX ADMIN — METOPROLOL TARTRATE 100 MG: 100 TABLET, FILM COATED ORAL at 17:49

## 2025-05-24 RX ADMIN — METOPROLOL TARTRATE 100 MG: 100 TABLET, FILM COATED ORAL at 06:31

## 2025-05-24 RX ADMIN — Medication 50 MCG: at 05:18

## 2025-05-24 RX ADMIN — EXTENDED PHENYTOIN SODIUM 200 MG: 100 CAPSULE, EXTENDED RELEASE ORAL at 05:18

## 2025-05-24 RX ADMIN — ASPIRIN 81 MG CHEWABLE TABLET 81 MG: 81 TABLET CHEWABLE at 05:19

## 2025-05-24 RX ADMIN — ACETAMINOPHEN 650 MG: 160 SOLUTION ORAL at 06:35

## 2025-05-24 RX ADMIN — OXCARBAZEPINE 1200 MG: 150 TABLET, FILM COATED ORAL at 05:33

## 2025-05-24 RX ADMIN — PHENOBARBITAL 64.8 MG: 32.4 TABLET ORAL at 17:49

## 2025-05-24 RX ADMIN — SERTRALINE HYDROCHLORIDE 50 MG: 50 TABLET ORAL at 17:49

## 2025-05-24 RX ADMIN — OXCARBAZEPINE 900 MG: 150 TABLET, FILM COATED ORAL at 18:07

## 2025-05-24 RX ADMIN — PHENOBARBITAL 64.8 MG: 32.4 TABLET ORAL at 05:19

## 2025-05-24 RX ADMIN — SERTRALINE HYDROCHLORIDE 50 MG: 50 TABLET ORAL at 05:18

## 2025-05-24 RX ADMIN — ATORVASTATIN CALCIUM 40 MG: 40 TABLET, FILM COATED ORAL at 17:49

## 2025-05-24 RX ADMIN — ACETAMINOPHEN 650 MG: 325 TABLET ORAL at 14:24

## 2025-05-24 RX ADMIN — ACETAMINOPHEN 650 MG: 325 TABLET ORAL at 20:44

## 2025-05-24 ASSESSMENT — COGNITIVE AND FUNCTIONAL STATUS - GENERAL
HELP NEEDED FOR BATHING: A LITTLE
DRESSING REGULAR UPPER BODY CLOTHING: A LITTLE
CLIMB 3 TO 5 STEPS WITH RAILING: A LITTLE
PERSONAL GROOMING: A LITTLE
STANDING UP FROM CHAIR USING ARMS: A LITTLE
EATING MEALS: A LITTLE
MOVING FROM LYING ON BACK TO SITTING ON SIDE OF FLAT BED WITH BEDRAILS: A LITTLE
WALKING IN HOSPITAL ROOM: A LITTLE
TOILETING: A LITTLE
DRESSING REGULAR LOWER BODY CLOTHING: A LITTLE
DAILY ACTIVITIY SCORE: 18
MOVING TO AND FROM BED TO CHAIR: A LITTLE
MOBILITY SCORE: 18
TURNING FROM BACK TO SIDE WHILE IN FLAT BAD: A LITTLE

## 2025-05-24 ASSESSMENT — PAIN - FUNCTIONAL ASSESSMENT: PAIN_FUNCTIONAL_ASSESSMENT: 0-10

## 2025-05-24 ASSESSMENT — PAIN SCALES - GENERAL
PAINLEVEL_OUTOF10: 0 - NO PAIN

## 2025-05-24 NOTE — CARE PLAN
The patient's goals for the shift include safety and comfort    The clinical goals for the shift include maintain comfort and safety    Over the shift, the patient did make progress toward the following goals.

## 2025-05-24 NOTE — PROGRESS NOTES
Direct precert team requested that updated clinicals be faxed to [facility] at [phone].  Completed.  Precert team updated.

## 2025-05-24 NOTE — ASSESSMENT & PLAN NOTE
Tylenol 650 mg twice daily given for headaches.  Transderm scopolamine for dizziness.  Recheck labs in AM.  Monitor overnight.

## 2025-05-24 NOTE — PROGRESS NOTES
Hunter Dejesus is a 75 y.o. male on day 2 of admission presenting with Dizziness.      Subjective   5/24: Slow but progressive improvements noted. Patient examined at bedside. Strict intake and output, fall precautions. Hypokalemia resolved, hemoglobin improved @ 11.6 from 10.2.  Will continue with orthostatic vitals, lisinopril discontinued, less dizziness today per patient, will continue to monitor overnight, ambulate patient with PT,  and reassess orthostatic vitals in the AM.       Objective     Last Recorded Vitals  /64   Pulse 60   Temp 35.9 °C (96.6 °F)   Resp 18   Wt 125 kg (275 lb)   SpO2 98%   Intake/Output last 3 Shifts:    Intake/Output Summary (Last 24 hours) at 5/24/2025 1232  Last data filed at 5/24/2025 0925  Gross per 24 hour   Intake 600 ml   Output 1725 ml   Net -1125 ml       Admission Weight  Weight: 125 kg (275 lb) (05/20/25 0748)    Daily Weight  05/20/25 : 125 kg (275 lb)    Image Results  Vascular US Carotid Artery Duplex Bilateral             [place] [address]  Tel [phone] and Fax [phone]       Vascular Lab Report  VASC US CAROTID ARTERY DUPLEX BILATERAL       Patient Name:      HUNTER DEJESUS          Reading Physician:  [id number] Jacey Sorto MD, RPVI  Study Date:        5/20/2025            Ordering Physician: [id number] GIANCARLO PASCUAL  MRN/PID:           [id number]             Technologist:       Mira Heredia T  Accession#:        [id number]         Technologist 2:  Date of Birth/Age: 1950 / 75 years Encounter#:         [id number]  Gender:            M  Admission Status:  Emergency            Location Performed: [facility]       Diagnosis/ICD: Dizziness and giddiness-R42  CPT Codes:     10103 Cerebrovascular Carotid Duplex scan complete       CONCLUSIONS:  Right Carotid: Findings are consistent with  less than 50% stenosis of the right proximal internal carotid artery. Laminar flow seen by color Doppler. Right external carotid artery appears patent with no evidence of stenosis. No evidence of hemodynamically significant stenosis of the right common carotid artery. The right vertebral artery is patent with antegrade flow. No evidence of hemodynamically significant stenosis in the right subclavian artery.  Left Carotid: Findings are consistent with less than 50% stenosis of the left proximal internal carotid artery. Laminar flow seen by color Doppler. Left external carotid artery appears patent with no evidence of stenosis. No evidence of hemodynamically significant stenosis of the left common carotid artery. The left vertebral artery is patent with antegrade flow. No evidence of hemodynamically significant stenosis in the left subclavian artery.     Imaging & Doppler Findings:  Right Plaque Morph: The proximal right internal carotid artery demonstrates heterogenous plaque. The proximal right external carotid artery demonstrates heterogenous plaque. The distal right common carotid artery demonstrates heterogenous plaque.  Left Plaque Morph: The proximal left internal carotid artery demonstrates heterogenous plaque. The distal left common carotid artery demonstrates heterogenous plaque.      Right                        Left    PSV      EDV                PSV      EDV  89 cm/s            CCA P    97 cm/s  68 cm/s            CCA D    81 cm/s  68 cm/s  16 cm/s   ICA P    84 cm/s  20 cm/s  79 cm/s  21 cm/s   ICA M    74 cm/s  17 cm/s  83 cm/s  18 cm/s   ICA D    88 cm/s  24 cm/s  132 cm/s            ECA     101 cm/s  49 cm/s  11 cm/s Vertebral  69 cm/s  17 cm/s  206 cm/s         Subclavian 185 cm/s                  Right Left  ICA/CCA Ratio  1.0  1.0          [id number] BABITA Curran MD  Electronically signed by [id number] BABITA Curran MD on 5/22/2025 at 12:02:53 AM       ** Final **      Physical Exam    General: in  no acute distress  Eyes: PERRLA   HENT: Normo-cephalic, atraumatic.   CV: Regular rate, regular rhythm.   Resp: Breathing non-labored,  diminished to auscultation bilaterally  GI: BS x4   : monitor intake and output  MSK/Extremities: No gross bony deformities. PT/OT on the case  Skin: Warm. Appropriate color, continue offloading  Neuro:  Face symmetric.   Psych: returning to baseline, improved     10 point ROS negative unless otherwise noted in HPI    Patient fully evaluated 5/24  for    Problem List Items Addressed This Visit       * (Principal) Dizziness - Primary    Relevant Orders    Phenytoin level, total (dilantin) (Completed)    Oxcarbazepine level (Completed)    Phenobarbital level (Completed)    Urinalysis with Reflex Culture and Microscopic (Completed)    CT abdomen pelvis wo IV contrast (Completed)    CT cervical spine wo IV contrast (Completed)    CBC and Auto Differential (Completed)    Magnesium (Completed)    Comprehensive metabolic panel (Completed)    CT head wo IV contrast (Completed)    CT lumbar spine retrospective reconstruction protocol (Completed)    CT thoracic spine wo IV contrast (Completed)    Urinalysis Microscopic (Completed)    Vascular US Carotid Artery Duplex Bilateral (Completed)    Transthoracic Echo Complete (Completed)    Difficulty walking    Relevant Orders    Phenytoin level, total (dilantin) (Completed)    Oxcarbazepine level (Completed)    Phenobarbital level (Completed)    Urinalysis with Reflex Culture and Microscopic (Completed)    CT abdomen pelvis wo IV contrast (Completed)    CT cervical spine wo IV contrast (Completed)    CBC and Auto Differential (Completed)    Magnesium (Completed)    Comprehensive metabolic panel (Completed)    CT head wo IV contrast (Completed)    CT lumbar spine retrospective reconstruction protocol (Completed)    CT thoracic spine wo IV contrast (Completed)    Urinalysis Microscopic (Completed)    Vascular US Carotid Artery Duplex Bilateral  (Completed)    Transthoracic Echo Complete (Completed)     Other Visit Diagnoses         Drug-induced dizziness        Relevant Orders    Transthoracic Echo Complete (Completed)      Encounter for other specified special examinations          Encounter for other preprocedural examination              Patient seen resting in bed with head of bed elevated, no s/s or c/o acute difficulties at this time.  Vital signs for last 24 hours Temp:  [35 °C (95 °F)-36 °C (96.8 °F)] 35.9 °C (96.6 °F)  Heart Rate:  [60-68] 60  Resp:  [17-18] 18  BP: (124-163)/(58-79) 135/64    I/O this shift:  In: 200 [P.O.:200]  Out: 175 [Urine:175]  Patient still requiring frequent cardiac and SPO2 monitoring. Continue aggressive pulmonary hygiene and oral hygiene. Off loading as tolerated for skin integrity. Medications and labs reviewed-   Results for orders placed or performed during the hospital encounter of 05/20/25 (from the past 24 hours)   Comprehensive Metabolic Panel   Result Value Ref Range    Glucose 81 74 - 99 mg/dL    Sodium 140 136 - 145 mmol/L    Potassium 3.7 3.5 - 5.3 mmol/L    Chloride 105 98 - 107 mmol/L    Bicarbonate 25 21 - 32 mmol/L    Anion Gap 14 10 - 20 mmol/L    Urea Nitrogen 11 6 - 23 mg/dL    Creatinine 0.68 0.50 - 1.30 mg/dL    eGFR >90 >60 mL/min/1.73m*2    Calcium 8.8 8.6 - 10.3 mg/dL    Albumin 3.4 3.4 - 5.0 g/dL    Alkaline Phosphatase 57 33 - 136 U/L    Total Protein 5.9 (L) 6.4 - 8.2 g/dL    AST 16 9 - 39 U/L    Bilirubin, Total 0.5 0.0 - 1.2 mg/dL    ALT 13 10 - 52 U/L   CBC and Auto Differential   Result Value Ref Range    WBC 5.5 4.4 - 11.3 x10*3/uL    nRBC 0.0 0.0 - 0.0 /100 WBCs    RBC 3.16 (L) 4.50 - 5.90 x10*6/uL    Hemoglobin 11.6 (L) 13.5 - 17.5 g/dL    Hematocrit 33.6 (L) 41.0 - 52.0 %     (H) 80 - 100 fL    MCH 36.7 (H) 26.0 - 34.0 pg    MCHC 34.5 32.0 - 36.0 g/dL    RDW 14.0 11.5 - 14.5 %    Platelets 198 150 - 450 x10*3/uL    Neutrophils % 60.7 40.0 - 80.0 %    Immature Granulocytes %,  Automated 0.4 0.0 - 0.9 %    Lymphocytes % 21.9 13.0 - 44.0 %    Monocytes % 9.2 2.0 - 10.0 %    Eosinophils % 7.1 0.0 - 6.0 %    Basophils % 0.7 0.0 - 2.0 %    Neutrophils Absolute 3.36 1.60 - 5.50 x10*3/uL    Immature Granulocytes Absolute, Automated 0.02 0.00 - 0.50 x10*3/uL    Lymphocytes Absolute 1.21 0.80 - 3.00 x10*3/uL    Monocytes Absolute 0.51 0.05 - 0.80 x10*3/uL    Eosinophils Absolute 0.39 0.00 - 0.40 x10*3/uL    Basophils Absolute 0.04 0.00 - 0.10 x10*3/uL      Patient recently received an antibiotic (last 12 hours)       None           Plan discussed with interdisciplinary team, continue current and repeat labs in the AM.       Discharge planning discussed with patient and care team. Therapy evaluations ordered.   [facility]- 18  Anticipate - Rehab     Patient aware and agreeable to current plan, continue plan as above.     I spent a total of 60 minutes on the date of the service which included preparing to see the patient, face-to-face patient care, completing clinical documentation, obtaining and/or reviewing separately obtained history, performing a medically appropriate examination, counseling and educating the patient/family/caregiver, ordering medications, tests, or procedures, communicating with other HCPs (not separately reported), independently interpreting results (not separately reported), communicating results to the patient/family/caregiver, and care coordination (not separately reported).                       Assessment & Plan  Dizziness    Multiple falls    Difficulty walking    Lewy body dementia (Multi)    HTN (hypertension)    Pulmonary nodule  Tylenol 650 mg twice daily given for headaches.  Transderm scopolamine for dizziness.  Recheck labs in AM.  Monitor overnight.           AGUS Mooney

## 2025-05-25 VITALS
WEIGHT: 275 LBS | OXYGEN SATURATION: 93 % | RESPIRATION RATE: 18 BRPM | HEIGHT: 72 IN | SYSTOLIC BLOOD PRESSURE: 119 MMHG | HEART RATE: 64 BPM | TEMPERATURE: 96.3 F | BODY MASS INDEX: 37.25 KG/M2 | DIASTOLIC BLOOD PRESSURE: 68 MMHG

## 2025-05-25 LAB
ALBUMIN SERPL BCP-MCNC: 3.4 G/DL (ref 3.4–5)
ALP SERPL-CCNC: 56 U/L (ref 33–136)
ALT SERPL W P-5'-P-CCNC: 13 U/L (ref 10–52)
ANION GAP SERPL CALC-SCNC: 12 MMOL/L (ref 10–20)
AST SERPL W P-5'-P-CCNC: 16 U/L (ref 9–39)
BASOPHILS # BLD AUTO: 0.05 X10*3/UL (ref 0–0.1)
BASOPHILS NFR BLD AUTO: 0.8 %
BILIRUB SERPL-MCNC: 0.5 MG/DL (ref 0–1.2)
BUN SERPL-MCNC: 9 MG/DL (ref 6–23)
CALCIUM SERPL-MCNC: 8.6 MG/DL (ref 8.6–10.3)
CHLORIDE SERPL-SCNC: 103 MMOL/L (ref 98–107)
CO2 SERPL-SCNC: 26 MMOL/L (ref 21–32)
CREAT SERPL-MCNC: 0.65 MG/DL (ref 0.5–1.3)
EGFRCR SERPLBLD CKD-EPI 2021: >90 ML/MIN/1.73M*2
EOSINOPHIL # BLD AUTO: 0.43 X10*3/UL (ref 0–0.4)
EOSINOPHIL NFR BLD AUTO: 7.2 %
ERYTHROCYTE [DISTWIDTH] IN BLOOD BY AUTOMATED COUNT: 13.6 % (ref 11.5–14.5)
GLUCOSE SERPL-MCNC: 85 MG/DL (ref 74–99)
HCT VFR BLD AUTO: 32.3 % (ref 41–52)
HGB BLD-MCNC: 11.3 G/DL (ref 13.5–17.5)
IMM GRANULOCYTES # BLD AUTO: 0.02 X10*3/UL (ref 0–0.5)
IMM GRANULOCYTES NFR BLD AUTO: 0.3 % (ref 0–0.9)
LYMPHOCYTES # BLD AUTO: 1.01 X10*3/UL (ref 0.8–3)
LYMPHOCYTES NFR BLD AUTO: 16.9 %
MCH RBC QN AUTO: 36.5 PG (ref 26–34)
MCHC RBC AUTO-ENTMCNC: 35 G/DL (ref 32–36)
MCV RBC AUTO: 104 FL (ref 80–100)
METHYLMALONATE SERPL-SCNC: 0.29 UMOL/L (ref 0–0.4)
MONOCYTES # BLD AUTO: 0.52 X10*3/UL (ref 0.05–0.8)
MONOCYTES NFR BLD AUTO: 8.7 %
NEUTROPHILS # BLD AUTO: 3.94 X10*3/UL (ref 1.6–5.5)
NEUTROPHILS NFR BLD AUTO: 66.1 %
NRBC BLD-RTO: 0 /100 WBCS (ref 0–0)
PLATELET # BLD AUTO: 211 X10*3/UL (ref 150–450)
POTASSIUM SERPL-SCNC: 3.6 MMOL/L (ref 3.5–5.3)
PROT SERPL-MCNC: 5.8 G/DL (ref 6.4–8.2)
RBC # BLD AUTO: 3.1 X10*6/UL (ref 4.5–5.9)
SODIUM SERPL-SCNC: 137 MMOL/L (ref 136–145)
WBC # BLD AUTO: 6 X10*3/UL (ref 4.4–11.3)

## 2025-05-25 PROCEDURE — 2500000001 HC RX 250 WO HCPCS SELF ADMINISTERED DRUGS (ALT 637 FOR MEDICARE OP): Performed by: INTERNAL MEDICINE

## 2025-05-25 PROCEDURE — 2500000002 HC RX 250 W HCPCS SELF ADMINISTERED DRUGS (ALT 637 FOR MEDICARE OP, ALT 636 FOR OP/ED): Performed by: PHYSICIAN ASSISTANT

## 2025-05-25 PROCEDURE — 85025 COMPLETE CBC W/AUTO DIFF WBC: CPT | Performed by: INTERNAL MEDICINE

## 2025-05-25 PROCEDURE — 36415 COLL VENOUS BLD VENIPUNCTURE: CPT | Performed by: INTERNAL MEDICINE

## 2025-05-25 PROCEDURE — 80053 COMPREHEN METABOLIC PANEL: CPT | Performed by: INTERNAL MEDICINE

## 2025-05-25 PROCEDURE — 1200000002 HC GENERAL ROOM WITH TELEMETRY DAILY

## 2025-05-25 PROCEDURE — 2500000001 HC RX 250 WO HCPCS SELF ADMINISTERED DRUGS (ALT 637 FOR MEDICARE OP): Performed by: NURSE PRACTITIONER

## 2025-05-25 PROCEDURE — 2500000001 HC RX 250 WO HCPCS SELF ADMINISTERED DRUGS (ALT 637 FOR MEDICARE OP): Performed by: PHYSICIAN ASSISTANT

## 2025-05-25 RX ORDER — ACETAMINOPHEN 325 MG/1
975 TABLET ORAL 2 TIMES DAILY
Status: DISCONTINUED | OUTPATIENT
Start: 2025-05-25 | End: 2025-05-28 | Stop reason: HOSPADM

## 2025-05-25 RX ADMIN — OXCARBAZEPINE 900 MG: 150 TABLET, FILM COATED ORAL at 17:32

## 2025-05-25 RX ADMIN — ACETAMINOPHEN 650 MG: 325 TABLET ORAL at 08:43

## 2025-05-25 RX ADMIN — PHENOBARBITAL 64.8 MG: 32.4 TABLET ORAL at 17:32

## 2025-05-25 RX ADMIN — SERTRALINE HYDROCHLORIDE 50 MG: 50 TABLET ORAL at 17:32

## 2025-05-25 RX ADMIN — PHENOBARBITAL 64.8 MG: 32.4 TABLET ORAL at 06:32

## 2025-05-25 RX ADMIN — ASPIRIN 81 MG CHEWABLE TABLET 81 MG: 81 TABLET CHEWABLE at 06:32

## 2025-05-25 RX ADMIN — METOPROLOL TARTRATE 100 MG: 100 TABLET, FILM COATED ORAL at 17:32

## 2025-05-25 RX ADMIN — RIVAROXABAN 20 MG: 20 TABLET, FILM COATED ORAL at 17:32

## 2025-05-25 RX ADMIN — OXCARBAZEPINE 1200 MG: 150 TABLET, FILM COATED ORAL at 06:55

## 2025-05-25 RX ADMIN — SERTRALINE HYDROCHLORIDE 50 MG: 50 TABLET ORAL at 06:32

## 2025-05-25 RX ADMIN — EXTENDED PHENYTOIN SODIUM 200 MG: 100 CAPSULE, EXTENDED RELEASE ORAL at 06:32

## 2025-05-25 RX ADMIN — DONEPEZIL HYDROCHLORIDE 10 MG: 10 TABLET ORAL at 06:33

## 2025-05-25 RX ADMIN — METOPROLOL TARTRATE 100 MG: 100 TABLET, FILM COATED ORAL at 06:32

## 2025-05-25 RX ADMIN — Medication 50 MCG: at 06:32

## 2025-05-25 RX ADMIN — ATORVASTATIN CALCIUM 40 MG: 40 TABLET, FILM COATED ORAL at 17:32

## 2025-05-25 RX ADMIN — EXTENDED PHENYTOIN SODIUM 200 MG: 100 CAPSULE, EXTENDED RELEASE ORAL at 17:32

## 2025-05-25 RX ADMIN — ACETAMINOPHEN 975 MG: 325 TABLET ORAL at 20:54

## 2025-05-25 RX ADMIN — ALPRAZOLAM 0.25 MG: 0.25 TABLET ORAL at 17:32

## 2025-05-25 ASSESSMENT — PAIN SCALES - GENERAL
PAINLEVEL_OUTOF10: 0 - NO PAIN
PAINLEVEL_OUTOF10: 0 - NO PAIN

## 2025-05-25 ASSESSMENT — PAIN - FUNCTIONAL ASSESSMENT: PAIN_FUNCTIONAL_ASSESSMENT: 0-10

## 2025-05-25 NOTE — PROGRESS NOTES
Hunter Dejesus is a 75 y.o. male on day 3 of admission presenting with Dizziness.      Subjective   5/25: Slow but progressive improvements noted. Patient agrees to SNF at discharge, [facility]. Patient examined at bedside. Hemoglobin slight drop @ 11.3 today. Strict intake and output, fall precautions. Continues with episodes of dizziness, will continue with orthostatic vitals, will continue to monitor overnight, ambulate patient with PT,  orthostatic vitals, repeat labs in the AM.       Objective     Last Recorded Vitals  /65   Pulse 60   Temp 36.1 °C (97 °F)   Resp 18   Wt 125 kg (275 lb)   SpO2 96%   Intake/Output last 3 Shifts:    Intake/Output Summary (Last 24 hours) at 5/25/2025 1116  Last data filed at 5/25/2025 0953  Gross per 24 hour   Intake 600 ml   Output 775 ml   Net -175 ml       Admission Weight  Weight: 125 kg (275 lb) (05/20/25 0748)    Daily Weight  05/20/25 : 125 kg (275 lb)    Image Results  Vascular US Carotid Artery Duplex Bilateral             [place] [address]  Tel [phone] and Fax [phone]       Vascular Lab Report  [facility] US CAROTID ARTERY DUPLEX BILATERAL       Patient Name:      HUNTER DEJESUS          Reading Physician:  [id number] Jacey Sorto MD, RPVI  Study Date:        5/20/2025            Ordering Physician: [id number] PASCUAL  MRN/PID:           [id number]             Technologist:       Mira Heredia SHYANNE  Accession#:        [id number]         Technologist 2:  Date of Birth/Age: 1950 / 75 years Encounter#:         [id number]  Gender:            M  Admission Status:  Emergency            Location Performed: [facility]       Diagnosis/ICD: Dizziness and giddiness-R42  CPT Codes:     34928 Cerebrovascular Carotid Duplex scan complete       CONCLUSIONS:  Right Carotid: Findings are consistent  with less than 50% stenosis of the right proximal internal carotid artery. Laminar flow seen by color Doppler. Right external carotid artery appears patent with no evidence of stenosis. No evidence of hemodynamically significant stenosis of the right common carotid artery. The right vertebral artery is patent with antegrade flow. No evidence of hemodynamically significant stenosis in the right subclavian artery.  Left Carotid: Findings are consistent with less than 50% stenosis of the left proximal internal carotid artery. Laminar flow seen by color Doppler. Left external carotid artery appears patent with no evidence of stenosis. No evidence of hemodynamically significant stenosis of the left common carotid artery. The left vertebral artery is patent with antegrade flow. No evidence of hemodynamically significant stenosis in the left subclavian artery.     Imaging & Doppler Findings:  Right Plaque Morph: The proximal right internal carotid artery demonstrates heterogenous plaque. The proximal right external carotid artery demonstrates heterogenous plaque. The distal right common carotid artery demonstrates heterogenous plaque.  Left Plaque Morph: The proximal left internal carotid artery demonstrates heterogenous plaque. The distal left common carotid artery demonstrates heterogenous plaque.      Right                        Left    PSV      EDV                PSV      EDV  89 cm/s            CCA P    97 cm/s  68 cm/s            CCA D    81 cm/s  68 cm/s  16 cm/s   ICA P    84 cm/s  20 cm/s  79 cm/s  21 cm/s   ICA M    74 cm/s  17 cm/s  83 cm/s  18 cm/s   ICA D    88 cm/s  24 cm/s  132 cm/s            ECA     101 cm/s  49 cm/s  11 cm/s Vertebral  69 cm/s  17 cm/s  206 cm/s         Subclavian 185 cm/s                  Right Left  ICA/CCA Ratio  1.0  1.0          [id number] BABITA Curran MD  Electronically signed by [id number] BABITA Curran MD on 5/22/2025 at 12:02:53 AM       ** Final **      Physical Exam    General:  in no acute distress  Eyes: PERRLA   HENT: Normo-cephalic, atraumatic.   CV: Regular rate, regular rhythm.   Resp: Breathing non-labored,  diminished to auscultation bilaterally  GI: BS x4   : monitor intake and output  MSK/Extremities: No gross bony deformities. PT/OT on the case  Skin: Warm. Appropriate color, continue offloading  Neuro:  Face symmetric.   Psych: returning to baseline, improved     10 point ROS negative unless otherwise noted in HPI    Patient fully evaluated 5/25  for    Problem List Items Addressed This Visit       * (Principal) Dizziness - Primary    Relevant Orders    Phenytoin level, total (dilantin) (Completed)    Oxcarbazepine level (Completed)    Phenobarbital level (Completed)    Urinalysis with Reflex Culture and Microscopic (Completed)    CT abdomen pelvis wo IV contrast (Completed)    CT cervical spine wo IV contrast (Completed)    CBC and Auto Differential (Completed)    Magnesium (Completed)    Comprehensive metabolic panel (Completed)    CT head wo IV contrast (Completed)    CT lumbar spine retrospective reconstruction protocol (Completed)    CT thoracic spine wo IV contrast (Completed)    Urinalysis Microscopic (Completed)    Vascular US Carotid Artery Duplex Bilateral (Completed)    Transthoracic Echo Complete (Completed)    Difficulty walking    Relevant Orders    Phenytoin level, total (dilantin) (Completed)    Oxcarbazepine level (Completed)    Phenobarbital level (Completed)    Urinalysis with Reflex Culture and Microscopic (Completed)    CT abdomen pelvis wo IV contrast (Completed)    CT cervical spine wo IV contrast (Completed)    CBC and Auto Differential (Completed)    Magnesium (Completed)    Comprehensive metabolic panel (Completed)    CT head wo IV contrast (Completed)    CT lumbar spine retrospective reconstruction protocol (Completed)    CT thoracic spine wo IV contrast (Completed)    Urinalysis Microscopic (Completed)    Vascular US Carotid Artery Duplex Bilateral  (Completed)    Transthoracic Echo Complete (Completed)     Other Visit Diagnoses         Drug-induced dizziness        Relevant Orders    Transthoracic Echo Complete (Completed)      Encounter for other specified special examinations          Encounter for other preprocedural examination              Patient seen resting in bed with head of bed elevated, no s/s or c/o acute difficulties at this time.  Vital signs for last 24 hours Temp:  [35.9 °C (96.6 °F)-36.3 °C (97.3 °F)] 36.1 °C (97 °F)  Heart Rate:  [59-67] 60  Resp:  [18-20] 18  BP: (116-136)/(59-65) 136/65    I/O this shift:  In: 200 [P.O.:200]  Out: -   Patient still requiring frequent cardiac and SPO2 monitoring. Continue aggressive pulmonary hygiene and oral hygiene. Off loading as tolerated for skin integrity. Medications and labs reviewed-   Results for orders placed or performed during the hospital encounter of 05/20/25 (from the past 24 hours)   Comprehensive Metabolic Panel   Result Value Ref Range    Glucose 85 74 - 99 mg/dL    Sodium 137 136 - 145 mmol/L    Potassium 3.6 3.5 - 5.3 mmol/L    Chloride 103 98 - 107 mmol/L    Bicarbonate 26 21 - 32 mmol/L    Anion Gap 12 10 - 20 mmol/L    Urea Nitrogen 9 6 - 23 mg/dL    Creatinine 0.65 0.50 - 1.30 mg/dL    eGFR >90 >60 mL/min/1.73m*2    Calcium 8.6 8.6 - 10.3 mg/dL    Albumin 3.4 3.4 - 5.0 g/dL    Alkaline Phosphatase 56 33 - 136 U/L    Total Protein 5.8 (L) 6.4 - 8.2 g/dL    AST 16 9 - 39 U/L    Bilirubin, Total 0.5 0.0 - 1.2 mg/dL    ALT 13 10 - 52 U/L   CBC and Auto Differential   Result Value Ref Range    WBC 6.0 4.4 - 11.3 x10*3/uL    nRBC 0.0 0.0 - 0.0 /100 WBCs    RBC 3.10 (L) 4.50 - 5.90 x10*6/uL    Hemoglobin 11.3 (L) 13.5 - 17.5 g/dL    Hematocrit 32.3 (L) 41.0 - 52.0 %     (H) 80 - 100 fL    MCH 36.5 (H) 26.0 - 34.0 pg    MCHC 35.0 32.0 - 36.0 g/dL    RDW 13.6 11.5 - 14.5 %    Platelets 211 150 - 450 x10*3/uL    Neutrophils % 66.1 40.0 - 80.0 %    Immature Granulocytes %, Automated 0.3  0.0 - 0.9 %    Lymphocytes % 16.9 13.0 - 44.0 %    Monocytes % 8.7 2.0 - 10.0 %    Eosinophils % 7.2 0.0 - 6.0 %    Basophils % 0.8 0.0 - 2.0 %    Neutrophils Absolute 3.94 1.60 - 5.50 x10*3/uL    Immature Granulocytes Absolute, Automated 0.02 0.00 - 0.50 x10*3/uL    Lymphocytes Absolute 1.01 0.80 - 3.00 x10*3/uL    Monocytes Absolute 0.52 0.05 - 0.80 x10*3/uL    Eosinophils Absolute 0.43 (H) 0.00 - 0.40 x10*3/uL    Basophils Absolute 0.05 0.00 - 0.10 x10*3/uL      Patient recently received an antibiotic (last 12 hours)       None           Plan discussed with interdisciplinary team, continue current and repeat labs in the AM.       Discharge planning discussed with patient and care team. Therapy evaluations ordered.   [facility]- 18  Anticipate - Rehab     Patient aware and agreeable to current plan, continue plan as above.     I spent a total of 60 minutes on the date of the service which included preparing to see the patient, face-to-face patient care, completing clinical documentation, obtaining and/or reviewing separately obtained history, performing a medically appropriate examination, counseling and educating the patient/family/caregiver, ordering medications, tests, or procedures, communicating with other HCPs (not separately reported), independently interpreting results (not separately reported), communicating results to the patient/family/caregiver, and care coordination (not separately reported).                       Assessment & Plan  Dizziness    Multiple falls    Difficulty walking    Lewy body dementia (Multi)    HTN (hypertension)    Pulmonary nodule  Tylenol 650 mg twice daily given for headaches.  Transderm scopolamine for dizziness.  Recheck labs in AM.  Monitor overnight.           AGUS Mooney

## 2025-05-25 NOTE — PROGRESS NOTES
Told Dr Lopez --precert obtained to [facility] an MD told me that pt wants to go to [facility], I verified with pt--asking team to make a referral to [facility] and if team can switch pre cert to [facility] from [facility].  Per Dr Lopez pt is not medically ready for [facility].   Terese Paul RN T[facility]    1320   per pre cert team unable to switch pre cert as insurance is closed today and tomorrow.  Team will follow up Tuesday.   [facility] can accept.  Terese Paul RN [facility]

## 2025-05-26 LAB
ALBUMIN SERPL BCP-MCNC: 3.3 G/DL (ref 3.4–5)
ALP SERPL-CCNC: 51 U/L (ref 33–136)
ALT SERPL W P-5'-P-CCNC: 12 U/L (ref 10–52)
ANION GAP SERPL CALC-SCNC: 11 MMOL/L (ref 10–20)
AST SERPL W P-5'-P-CCNC: 15 U/L (ref 9–39)
BASOPHILS # BLD AUTO: 0.04 X10*3/UL (ref 0–0.1)
BASOPHILS NFR BLD AUTO: 0.8 %
BILIRUB SERPL-MCNC: 0.4 MG/DL (ref 0–1.2)
BUN SERPL-MCNC: 12 MG/DL (ref 6–23)
CALCIUM SERPL-MCNC: 8.6 MG/DL (ref 8.6–10.3)
CHLORIDE SERPL-SCNC: 103 MMOL/L (ref 98–107)
CO2 SERPL-SCNC: 27 MMOL/L (ref 21–32)
CREAT SERPL-MCNC: 0.67 MG/DL (ref 0.5–1.3)
EGFRCR SERPLBLD CKD-EPI 2021: >90 ML/MIN/1.73M*2
EOSINOPHIL # BLD AUTO: 0.35 X10*3/UL (ref 0–0.4)
EOSINOPHIL NFR BLD AUTO: 7 %
ERYTHROCYTE [DISTWIDTH] IN BLOOD BY AUTOMATED COUNT: 13.6 % (ref 11.5–14.5)
GLUCOSE SERPL-MCNC: 86 MG/DL (ref 74–99)
HCT VFR BLD AUTO: 31.6 % (ref 41–52)
HGB BLD-MCNC: 10.6 G/DL (ref 13.5–17.5)
IMM GRANULOCYTES # BLD AUTO: 0.01 X10*3/UL (ref 0–0.5)
IMM GRANULOCYTES NFR BLD AUTO: 0.2 % (ref 0–0.9)
LYMPHOCYTES # BLD AUTO: 1.08 X10*3/UL (ref 0.8–3)
LYMPHOCYTES NFR BLD AUTO: 21.6 %
MCH RBC QN AUTO: 34.1 PG (ref 26–34)
MCHC RBC AUTO-ENTMCNC: 33.5 G/DL (ref 32–36)
MCV RBC AUTO: 102 FL (ref 80–100)
MONOCYTES # BLD AUTO: 0.43 X10*3/UL (ref 0.05–0.8)
MONOCYTES NFR BLD AUTO: 8.6 %
NEUTROPHILS # BLD AUTO: 3.08 X10*3/UL (ref 1.6–5.5)
NEUTROPHILS NFR BLD AUTO: 61.8 %
NRBC BLD-RTO: 0 /100 WBCS (ref 0–0)
PLATELET # BLD AUTO: 185 X10*3/UL (ref 150–450)
POTASSIUM SERPL-SCNC: 3.9 MMOL/L (ref 3.5–5.3)
PROT SERPL-MCNC: 5.6 G/DL (ref 6.4–8.2)
RBC # BLD AUTO: 3.11 X10*6/UL (ref 4.5–5.9)
SODIUM SERPL-SCNC: 137 MMOL/L (ref 136–145)
WBC # BLD AUTO: 5 X10*3/UL (ref 4.4–11.3)

## 2025-05-26 PROCEDURE — 2500000002 HC RX 250 W HCPCS SELF ADMINISTERED DRUGS (ALT 637 FOR MEDICARE OP, ALT 636 FOR OP/ED): Performed by: PHYSICIAN ASSISTANT

## 2025-05-26 PROCEDURE — 2500000001 HC RX 250 WO HCPCS SELF ADMINISTERED DRUGS (ALT 637 FOR MEDICARE OP): Performed by: NURSE PRACTITIONER

## 2025-05-26 PROCEDURE — 85025 COMPLETE CBC W/AUTO DIFF WBC: CPT | Performed by: INTERNAL MEDICINE

## 2025-05-26 PROCEDURE — 2500000001 HC RX 250 WO HCPCS SELF ADMINISTERED DRUGS (ALT 637 FOR MEDICARE OP): Performed by: PHYSICIAN ASSISTANT

## 2025-05-26 PROCEDURE — 80053 COMPREHEN METABOLIC PANEL: CPT | Performed by: INTERNAL MEDICINE

## 2025-05-26 PROCEDURE — 1200000002 HC GENERAL ROOM WITH TELEMETRY DAILY

## 2025-05-26 PROCEDURE — 36415 COLL VENOUS BLD VENIPUNCTURE: CPT | Performed by: INTERNAL MEDICINE

## 2025-05-26 RX ADMIN — OXCARBAZEPINE 900 MG: 150 TABLET, FILM COATED ORAL at 18:05

## 2025-05-26 RX ADMIN — ASPIRIN 81 MG CHEWABLE TABLET 81 MG: 81 TABLET CHEWABLE at 06:05

## 2025-05-26 RX ADMIN — EXTENDED PHENYTOIN SODIUM 200 MG: 100 CAPSULE, EXTENDED RELEASE ORAL at 06:05

## 2025-05-26 RX ADMIN — SERTRALINE HYDROCHLORIDE 50 MG: 50 TABLET ORAL at 18:05

## 2025-05-26 RX ADMIN — ACETAMINOPHEN 975 MG: 325 TABLET ORAL at 20:35

## 2025-05-26 RX ADMIN — METOPROLOL TARTRATE 100 MG: 100 TABLET, FILM COATED ORAL at 18:05

## 2025-05-26 RX ADMIN — ALPRAZOLAM 0.25 MG: 0.25 TABLET ORAL at 18:05

## 2025-05-26 RX ADMIN — OXCARBAZEPINE 1200 MG: 150 TABLET, FILM COATED ORAL at 06:04

## 2025-05-26 RX ADMIN — SERTRALINE HYDROCHLORIDE 50 MG: 50 TABLET ORAL at 06:05

## 2025-05-26 RX ADMIN — ACETAMINOPHEN 975 MG: 325 TABLET ORAL at 09:22

## 2025-05-26 RX ADMIN — Medication 50 MCG: at 06:05

## 2025-05-26 RX ADMIN — MECLIZINE HYDROCHLORIDE 25 MG: 25 TABLET ORAL at 09:28

## 2025-05-26 RX ADMIN — PHENOBARBITAL 64.8 MG: 32.4 TABLET ORAL at 06:05

## 2025-05-26 RX ADMIN — EXTENDED PHENYTOIN SODIUM 200 MG: 100 CAPSULE, EXTENDED RELEASE ORAL at 18:05

## 2025-05-26 RX ADMIN — METOPROLOL TARTRATE 100 MG: 100 TABLET, FILM COATED ORAL at 06:05

## 2025-05-26 RX ADMIN — PHENOBARBITAL 64.8 MG: 32.4 TABLET ORAL at 18:05

## 2025-05-26 RX ADMIN — RIVAROXABAN 20 MG: 20 TABLET, FILM COATED ORAL at 18:05

## 2025-05-26 RX ADMIN — DONEPEZIL HYDROCHLORIDE 10 MG: 10 TABLET ORAL at 06:05

## 2025-05-26 RX ADMIN — ATORVASTATIN CALCIUM 40 MG: 40 TABLET, FILM COATED ORAL at 18:05

## 2025-05-26 ASSESSMENT — PAIN SCALES - WONG BAKER: WONGBAKER_NUMERICALRESPONSE: NO HURT

## 2025-05-26 ASSESSMENT — PAIN SCALES - GENERAL
PAINLEVEL_OUTOF10: 0 - NO PAIN
PAINLEVEL_OUTOF10: 0 - NO PAIN

## 2025-05-26 ASSESSMENT — PAIN - FUNCTIONAL ASSESSMENT: PAIN_FUNCTIONAL_ASSESSMENT: 0-10

## 2025-05-26 NOTE — PROGRESS NOTES
Hunter Dejesus is a 75 y.o. male on day 4 of admission presenting with Dizziness.      Subjective   5/26: Slow but progressive improvements noted. Awaiting precert for facility of [facility]  [place]. Patient more animated today, fully examined at bedside. Hemoglobin drop @ 10.6 from 11.3 today. No s/s of acute bleed at time of exam,  will continue to monitor overnight, ambulate patient with PT,  orthostatic vitals, repeat labs in the AM. Continue with strict intake and output, fall precautions. Continues with episodes of dizziness, will continue with orthostatic vitals.     Objective     Last Recorded Vitals  /60   Pulse 60   Temp 36.1 °C (97 °F)   Resp 17   Wt 125 kg (275 lb)   SpO2 94%   Intake/Output last 3 Shifts:    Intake/Output Summary (Last 24 hours) at 5/26/2025 1231  Last data filed at 5/26/2025 0934  Gross per 24 hour   Intake 400 ml   Output 1055 ml   Net -655 ml       Admission Weight  Weight: 125 kg (275 lb) (05/20/25 0748)    Daily Weight  05/20/25 : 125 kg (275 lb)    Image Results  Vascular US Carotid Artery Duplex Bilateral             [place] [id number]  Tel [phone] and Fax [phone]       Vascular Lab Report  VASC US CAROTID ARTERY DUPLEX BILATERAL       Patient Name:      HUNTER DEJESUS          Reading Physician:  [id number] Jacey Sorto MD, RPVI  Study Date:        5/20/2025            Ordering Physician: [id number] GIANCARLO PASCUAL  MRN/PID:           [id number]             Technologist:       Mira Heredia T  Accession#:        [id number]         Technologist 2:  Date of Birth/Age: 1950 / 75 years Encounter#:         [id number]  Gender:            M  Admission Status:  Emergency            Location Performed: [facility]       Diagnosis/ICD: Dizziness and giddiness-R42  CPT Codes:     14993 Cerebrovascular  Carotid Duplex scan complete       CONCLUSIONS:  Right Carotid: Findings are consistent with less than 50% stenosis of the right proximal internal carotid artery. Laminar flow seen by color Doppler. Right external carotid artery appears patent with no evidence of stenosis. No evidence of hemodynamically significant stenosis of the right common carotid artery. The right vertebral artery is patent with antegrade flow. No evidence of hemodynamically significant stenosis in the right subclavian artery.  Left Carotid: Findings are consistent with less than 50% stenosis of the left proximal internal carotid artery. Laminar flow seen by color Doppler. Left external carotid artery appears patent with no evidence of stenosis. No evidence of hemodynamically significant stenosis of the left common carotid artery. The left vertebral artery is patent with antegrade flow. No evidence of hemodynamically significant stenosis in the left subclavian artery.     Imaging & Doppler Findings:  Right Plaque Morph: The proximal right internal carotid artery demonstrates heterogenous plaque. The proximal right external carotid artery demonstrates heterogenous plaque. The distal right common carotid artery demonstrates heterogenous plaque.  Left Plaque Morph: The proximal left internal carotid artery demonstrates heterogenous plaque. The distal left common carotid artery demonstrates heterogenous plaque.      Right                        Left    PSV      EDV                PSV      EDV  89 cm/s            CCA P    97 cm/s  68 cm/s            CCA D    81 cm/s  68 cm/s  16 cm/s   ICA P    84 cm/s  20 cm/s  79 cm/s  21 cm/s   ICA M    74 cm/s  17 cm/s  83 cm/s  18 cm/s   ICA D    88 cm/s  24 cm/s  132 cm/s            ECA     101 cm/s  49 cm/s  11 cm/s Vertebral  69 cm/s  17 cm/s  206 cm/s         Subclavian 185 cm/s                  Right Left  ICA/CCA Ratio  1.0  1.0          [id number] Jacey Sorto MD, RPVI  Electronically signed by [id number] Jacey  Noreen JORDAN, RPVI on 5/22/2025 at 12:02:53 AM       ** Final **      Physical Exam    General: in no acute distress  Eyes: PERRLA   HENT: Normo-cephalic, atraumatic.   CV: Regular rate, regular rhythm.   Resp: Breathing non-labored,  diminished to auscultation bilaterally  GI: BS x4   : monitor intake and output  MSK/Extremities: No gross bony deformities. PT/OT on the case  Skin: Warm. Appropriate color, continue offloading  Neuro:  Face symmetric.   Psych: returning to baseline, improved     10 point ROS negative unless otherwise noted in HPI    Patient fully evaluated 5/26  for    Problem List Items Addressed This Visit       * (Principal) Dizziness - Primary    Relevant Orders    Phenytoin level, total (dilantin) (Completed)    Oxcarbazepine level (Completed)    Phenobarbital level (Completed)    Urinalysis with Reflex Culture and Microscopic (Completed)    CT abdomen pelvis wo IV contrast (Completed)    CT cervical spine wo IV contrast (Completed)    CBC and Auto Differential (Completed)    Magnesium (Completed)    Comprehensive metabolic panel (Completed)    CT head wo IV contrast (Completed)    CT lumbar spine retrospective reconstruction protocol (Completed)    CT thoracic spine wo IV contrast (Completed)    Urinalysis Microscopic (Completed)    Vascular US Carotid Artery Duplex Bilateral (Completed)    Transthoracic Echo Complete (Completed)    Difficulty walking    Relevant Orders    Phenytoin level, total (dilantin) (Completed)    Oxcarbazepine level (Completed)    Phenobarbital level (Completed)    Urinalysis with Reflex Culture and Microscopic (Completed)    CT abdomen pelvis wo IV contrast (Completed)    CT cervical spine wo IV contrast (Completed)    CBC and Auto Differential (Completed)    Magnesium (Completed)    Comprehensive metabolic panel (Completed)    CT head wo IV contrast (Completed)    CT lumbar spine retrospective reconstruction protocol (Completed)    CT thoracic spine wo IV contrast  (Completed)    Urinalysis Microscopic (Completed)    Vascular US Carotid Artery Duplex Bilateral (Completed)    Transthoracic Echo Complete (Completed)     Other Visit Diagnoses         Drug-induced dizziness        Relevant Orders    Transthoracic Echo Complete (Completed)      Encounter for other specified special examinations          Encounter for other preprocedural examination              Patient seen resting in bed with head of bed elevated, no s/s or c/o acute difficulties at this time.  Vital signs for last 24 hours Temp:  [35.6 °C (96.1 °F)-36.4 °C (97.5 °F)] 36.1 °C (97 °F)  Heart Rate:  [60-73] 60  Resp:  [16-18] 17  BP: (106-159)/(51-70) 114/60    I/O this shift:  In: 200 [P.O.:200]  Out: 180 [Urine:180]  Patient still requiring frequent cardiac and SPO2 monitoring. Continue aggressive pulmonary hygiene and oral hygiene. Off loading as tolerated for skin integrity. Medications and labs reviewed-   Results for orders placed or performed during the hospital encounter of 05/20/25 (from the past 24 hours)   Comprehensive Metabolic Panel   Result Value Ref Range    Glucose 86 74 - 99 mg/dL    Sodium 137 136 - 145 mmol/L    Potassium 3.9 3.5 - 5.3 mmol/L    Chloride 103 98 - 107 mmol/L    Bicarbonate 27 21 - 32 mmol/L    Anion Gap 11 10 - 20 mmol/L    Urea Nitrogen 12 6 - 23 mg/dL    Creatinine 0.67 0.50 - 1.30 mg/dL    eGFR >90 >60 mL/min/1.73m*2    Calcium 8.6 8.6 - 10.3 mg/dL    Albumin 3.3 (L) 3.4 - 5.0 g/dL    Alkaline Phosphatase 51 33 - 136 U/L    Total Protein 5.6 (L) 6.4 - 8.2 g/dL    AST 15 9 - 39 U/L    Bilirubin, Total 0.4 0.0 - 1.2 mg/dL    ALT 12 10 - 52 U/L   CBC and Auto Differential   Result Value Ref Range    WBC 5.0 4.4 - 11.3 x10*3/uL    nRBC 0.0 0.0 - 0.0 /100 WBCs    RBC 3.11 (L) 4.50 - 5.90 x10*6/uL    Hemoglobin 10.6 (L) 13.5 - 17.5 g/dL    Hematocrit 31.6 (L) 41.0 - 52.0 %     (H) 80 - 100 fL    MCH 34.1 (H) 26.0 - 34.0 pg    MCHC 33.5 32.0 - 36.0 g/dL    RDW 13.6 11.5 - 14.5  %    Platelets 185 150 - 450 x10*3/uL    Neutrophils % 61.8 40.0 - 80.0 %    Immature Granulocytes %, Automated 0.2 0.0 - 0.9 %    Lymphocytes % 21.6 13.0 - 44.0 %    Monocytes % 8.6 2.0 - 10.0 %    Eosinophils % 7.0 0.0 - 6.0 %    Basophils % 0.8 0.0 - 2.0 %    Neutrophils Absolute 3.08 1.60 - 5.50 x10*3/uL    Immature Granulocytes Absolute, Automated 0.01 0.00 - 0.50 x10*3/uL    Lymphocytes Absolute 1.08 0.80 - 3.00 x10*3/uL    Monocytes Absolute 0.43 0.05 - 0.80 x10*3/uL    Eosinophils Absolute 0.35 0.00 - 0.40 x10*3/uL    Basophils Absolute 0.04 0.00 - 0.10 x10*3/uL      Patient recently received an antibiotic (last 12 hours)       None           Plan discussed with interdisciplinary team, continue current and repeat labs in the AM.       Discharge planning discussed with patient and care team. Therapy evaluations ordered.   [facility]  Anticipate - Rehab     Patient aware and agreeable to current plan, continue plan as above.     I spent a total of 60 minutes on the date of the service which included preparing to see the patient, face-to-face patient care, completing clinical documentation, obtaining and/or reviewing separately obtained history, performing a medically appropriate examination, counseling and educating the patient/family/caregiver, ordering medications, tests, or procedures, communicating with other HCPs (not separately reported), independently interpreting results (not separately reported), communicating results to the patient/family/caregiver, and care coordination (not separately reported).                       Assessment & Plan  Dizziness    Multiple falls    Difficulty walking    Lewy body dementia (Multi)    HTN (hypertension)    Pulmonary nodule  Tylenol 650 mg twice daily given for headaches.  Transderm scopolamine for dizziness.  Recheck labs in AM.  Monitor overnight.           AGUS Mooney

## 2025-05-26 NOTE — CARE PLAN
The patient's goals for the shift include      The clinical goals for the shift include safety and comfort    Over the shift, the patient did make progress toward the following goals.

## 2025-05-27 VITALS
BODY MASS INDEX: 37.25 KG/M2 | TEMPERATURE: 97.9 F | DIASTOLIC BLOOD PRESSURE: 70 MMHG | HEIGHT: 72 IN | SYSTOLIC BLOOD PRESSURE: 152 MMHG | RESPIRATION RATE: 18 BRPM | OXYGEN SATURATION: 95 % | HEART RATE: 63 BPM | WEIGHT: 275 LBS

## 2025-05-27 LAB
ALBUMIN SERPL BCP-MCNC: 3.4 G/DL (ref 3.4–5)
ALP SERPL-CCNC: 54 U/L (ref 33–136)
ALT SERPL W P-5'-P-CCNC: 12 U/L (ref 10–52)
ANION GAP SERPL CALC-SCNC: 10 MMOL/L (ref 10–20)
AST SERPL W P-5'-P-CCNC: 15 U/L (ref 9–39)
BASOPHILS # BLD AUTO: 0.06 X10*3/UL (ref 0–0.1)
BASOPHILS NFR BLD AUTO: 1.1 %
BILIRUB SERPL-MCNC: 0.4 MG/DL (ref 0–1.2)
BUN SERPL-MCNC: 14 MG/DL (ref 6–23)
CALCIUM SERPL-MCNC: 8.6 MG/DL (ref 8.6–10.3)
CHLORIDE SERPL-SCNC: 103 MMOL/L (ref 98–107)
CO2 SERPL-SCNC: 29 MMOL/L (ref 21–32)
CREAT SERPL-MCNC: 0.74 MG/DL (ref 0.5–1.3)
EGFRCR SERPLBLD CKD-EPI 2021: >90 ML/MIN/1.73M*2
EOSINOPHIL # BLD AUTO: 0.4 X10*3/UL (ref 0–0.4)
EOSINOPHIL NFR BLD AUTO: 7.3 %
ERYTHROCYTE [DISTWIDTH] IN BLOOD BY AUTOMATED COUNT: 13.8 % (ref 11.5–14.5)
GLUCOSE SERPL-MCNC: 82 MG/DL (ref 74–99)
HCT VFR BLD AUTO: 32.1 % (ref 41–52)
HGB BLD-MCNC: 11.1 G/DL (ref 13.5–17.5)
HOLD SPECIMEN: NORMAL
IMM GRANULOCYTES # BLD AUTO: 0.02 X10*3/UL (ref 0–0.5)
IMM GRANULOCYTES NFR BLD AUTO: 0.4 % (ref 0–0.9)
LYMPHOCYTES # BLD AUTO: 1.45 X10*3/UL (ref 0.8–3)
LYMPHOCYTES NFR BLD AUTO: 26.4 %
MCH RBC QN AUTO: 36 PG (ref 26–34)
MCHC RBC AUTO-ENTMCNC: 34.6 G/DL (ref 32–36)
MCV RBC AUTO: 104 FL (ref 80–100)
MONOCYTES # BLD AUTO: 0.47 X10*3/UL (ref 0.05–0.8)
MONOCYTES NFR BLD AUTO: 8.6 %
NEUTROPHILS # BLD AUTO: 3.09 X10*3/UL (ref 1.6–5.5)
NEUTROPHILS NFR BLD AUTO: 56.2 %
NRBC BLD-RTO: 0 /100 WBCS (ref 0–0)
PLATELET # BLD AUTO: 218 X10*3/UL (ref 150–450)
POTASSIUM SERPL-SCNC: 3.9 MMOL/L (ref 3.5–5.3)
PROT SERPL-MCNC: 5.7 G/DL (ref 6.4–8.2)
RBC # BLD AUTO: 3.08 X10*6/UL (ref 4.5–5.9)
SODIUM SERPL-SCNC: 138 MMOL/L (ref 136–145)
WBC # BLD AUTO: 5.5 X10*3/UL (ref 4.4–11.3)

## 2025-05-27 PROCEDURE — 2500000001 HC RX 250 WO HCPCS SELF ADMINISTERED DRUGS (ALT 637 FOR MEDICARE OP): Performed by: PHYSICIAN ASSISTANT

## 2025-05-27 PROCEDURE — 80053 COMPREHEN METABOLIC PANEL: CPT | Performed by: INTERNAL MEDICINE

## 2025-05-27 PROCEDURE — 85025 COMPLETE CBC W/AUTO DIFF WBC: CPT | Performed by: INTERNAL MEDICINE

## 2025-05-27 PROCEDURE — 97530 THERAPEUTIC ACTIVITIES: CPT | Mod: GP,CQ

## 2025-05-27 PROCEDURE — 97110 THERAPEUTIC EXERCISES: CPT | Mod: GP,CQ

## 2025-05-27 PROCEDURE — 2500000004 HC RX 250 GENERAL PHARMACY W/ HCPCS (ALT 636 FOR OP/ED): Performed by: INTERNAL MEDICINE

## 2025-05-27 PROCEDURE — 2500000002 HC RX 250 W HCPCS SELF ADMINISTERED DRUGS (ALT 637 FOR MEDICARE OP, ALT 636 FOR OP/ED): Performed by: PHYSICIAN ASSISTANT

## 2025-05-27 PROCEDURE — 2500000001 HC RX 250 WO HCPCS SELF ADMINISTERED DRUGS (ALT 637 FOR MEDICARE OP): Performed by: NURSE PRACTITIONER

## 2025-05-27 PROCEDURE — 2500000002 HC RX 250 W HCPCS SELF ADMINISTERED DRUGS (ALT 637 FOR MEDICARE OP, ALT 636 FOR OP/ED): Performed by: INTERNAL MEDICINE

## 2025-05-27 PROCEDURE — 97110 THERAPEUTIC EXERCISES: CPT | Mod: GO

## 2025-05-27 PROCEDURE — 36415 COLL VENOUS BLD VENIPUNCTURE: CPT | Performed by: INTERNAL MEDICINE

## 2025-05-27 RX ORDER — PROPRANOLOL HYDROCHLORIDE 60 MG/1
60 CAPSULE, EXTENDED RELEASE ORAL DAILY
Status: DISCONTINUED | OUTPATIENT
Start: 2025-05-27 | End: 2025-05-28 | Stop reason: HOSPADM

## 2025-05-27 RX ORDER — SCOPOLAMINE 1 MG/3D
1 PATCH, EXTENDED RELEASE TRANSDERMAL
Start: 2025-05-27

## 2025-05-27 RX ORDER — MECLIZINE HYDROCHLORIDE 25 MG/1
25 TABLET ORAL 3 TIMES DAILY PRN
Start: 2025-05-27

## 2025-05-27 RX ORDER — PROPRANOLOL HYDROCHLORIDE 60 MG/1
60 CAPSULE, EXTENDED RELEASE ORAL DAILY
Start: 2025-05-27

## 2025-05-27 RX ORDER — MECLIZINE HYDROCHLORIDE 25 MG/1
25 TABLET ORAL 4 TIMES DAILY
Status: DISCONTINUED | OUTPATIENT
Start: 2025-05-27 | End: 2025-05-28 | Stop reason: HOSPADM

## 2025-05-27 RX ORDER — MECLIZINE HYDROCHLORIDE 25 MG/1
25 TABLET ORAL 3 TIMES DAILY
Status: DISCONTINUED | OUTPATIENT
Start: 2025-05-27 | End: 2025-05-27

## 2025-05-27 RX ORDER — MECLIZINE HYDROCHLORIDE 25 MG/1
25 TABLET ORAL 4 TIMES DAILY
Start: 2025-05-27

## 2025-05-27 RX ADMIN — PROPRANOLOL HYDROCHLORIDE 60 MG: 60 CAPSULE, EXTENDED RELEASE ORAL at 16:52

## 2025-05-27 RX ADMIN — Medication 50 MCG: at 07:45

## 2025-05-27 RX ADMIN — MECLIZINE HYDROCHLORIDE 25 MG: 25 TABLET ORAL at 20:55

## 2025-05-27 RX ADMIN — PHENOBARBITAL 64.8 MG: 32.4 TABLET ORAL at 07:44

## 2025-05-27 RX ADMIN — ACETAMINOPHEN 975 MG: 325 TABLET ORAL at 20:55

## 2025-05-27 RX ADMIN — SCOPOLAMINE 1 PATCH: 1.5 PATCH, EXTENDED RELEASE TRANSDERMAL at 15:04

## 2025-05-27 RX ADMIN — MECLIZINE HYDROCHLORIDE 25 MG: 25 TABLET ORAL at 15:04

## 2025-05-27 RX ADMIN — ASPIRIN 81 MG CHEWABLE TABLET 81 MG: 81 TABLET CHEWABLE at 07:44

## 2025-05-27 RX ADMIN — EXTENDED PHENYTOIN SODIUM 200 MG: 100 CAPSULE, EXTENDED RELEASE ORAL at 07:44

## 2025-05-27 RX ADMIN — EXTENDED PHENYTOIN SODIUM 200 MG: 100 CAPSULE, EXTENDED RELEASE ORAL at 18:07

## 2025-05-27 RX ADMIN — ALPRAZOLAM 0.25 MG: 0.25 TABLET ORAL at 18:07

## 2025-05-27 RX ADMIN — SERTRALINE HYDROCHLORIDE 50 MG: 50 TABLET ORAL at 07:44

## 2025-05-27 RX ADMIN — ATORVASTATIN CALCIUM 40 MG: 40 TABLET, FILM COATED ORAL at 18:07

## 2025-05-27 RX ADMIN — DONEPEZIL HYDROCHLORIDE 10 MG: 10 TABLET ORAL at 07:44

## 2025-05-27 RX ADMIN — PHENOBARBITAL 64.8 MG: 32.4 TABLET ORAL at 18:07

## 2025-05-27 RX ADMIN — SERTRALINE HYDROCHLORIDE 50 MG: 50 TABLET ORAL at 18:07

## 2025-05-27 RX ADMIN — OXCARBAZEPINE 900 MG: 150 TABLET, FILM COATED ORAL at 18:07

## 2025-05-27 RX ADMIN — ACETAMINOPHEN 975 MG: 325 TABLET ORAL at 09:02

## 2025-05-27 RX ADMIN — RIVAROXABAN 20 MG: 20 TABLET, FILM COATED ORAL at 18:07

## 2025-05-27 RX ADMIN — OXCARBAZEPINE 1200 MG: 150 TABLET, FILM COATED ORAL at 07:40

## 2025-05-27 RX ADMIN — METOPROLOL TARTRATE 100 MG: 100 TABLET, FILM COATED ORAL at 07:44

## 2025-05-27 RX ADMIN — MECLIZINE HYDROCHLORIDE 25 MG: 25 TABLET ORAL at 18:07

## 2025-05-27 ASSESSMENT — COGNITIVE AND FUNCTIONAL STATUS - GENERAL
PERSONAL GROOMING: A LITTLE
MOVING TO AND FROM BED TO CHAIR: A LITTLE
MOVING FROM LYING ON BACK TO SITTING ON SIDE OF FLAT BED WITH BEDRAILS: A LITTLE
MOBILITY SCORE: 15
STANDING UP FROM CHAIR USING ARMS: A LITTLE
CLIMB 3 TO 5 STEPS WITH RAILING: TOTAL
DAILY ACTIVITIY SCORE: 12
DRESSING REGULAR UPPER BODY CLOTHING: A LOT
TOILETING: TOTAL
DRESSING REGULAR LOWER BODY CLOTHING: TOTAL
WALKING IN HOSPITAL ROOM: A LOT
HELP NEEDED FOR BATHING: TOTAL
TURNING FROM BACK TO SIDE WHILE IN FLAT BAD: A LITTLE

## 2025-05-27 ASSESSMENT — PAIN SCALES - GENERAL
PAINLEVEL_OUTOF10: 0 - NO PAIN

## 2025-05-27 ASSESSMENT — PAIN - FUNCTIONAL ASSESSMENT
PAIN_FUNCTIONAL_ASSESSMENT: 0-10
PAIN_FUNCTIONAL_ASSESSMENT: 0-10

## 2025-05-27 ASSESSMENT — PAIN SCALES - WONG BAKER: WONGBAKER_NUMERICALRESPONSE: NO HURT

## 2025-05-27 NOTE — PROGRESS NOTES
05/27/25 1619   Discharge Planning   Living Arrangements Spouse/significant other   Support Systems Spouse/significant other   Expected Discharge Disposition SNF     Patient has discharge orders, insurance authorization and transportation scheduled at 2000 to take him to [place].  RN and patient are aware; patient stated he will inform his wife himself.  Kate LEWIS TCC

## 2025-05-27 NOTE — DISCHARGE SUMMARY
Discharge Diagnosis  Dizziness       Issues Requiring Follow-Up  05/27: no new complaints per patient, no acute events overnight. Patient fully examined at bedside, stable, medically cleared for discharge today. [facility]- 15, awaiting precert to be switched to [place] today.     Discharge Meds     Medication List      START taking these medications     meclizine 25 mg tablet; Commonly known as: Antivert; Take 1 tablet (25   mg) by mouth 3 times a day as needed (dizziness).   scopolamine 1 mg over 3 days patch 3 day; Commonly known as:   Transderm-Scop; Place 1 patch over 72 hours on the skin every 3rd day.     CHANGE how you take these medications     donepezil 10 mg tablet; Commonly known as: Aricept; Please take 10   mg/day; What changed: how much to take, how to take this, when to take   this, additional instructions     CONTINUE taking these medications     ALPRAZolam 0.25 mg tablet; Commonly known as: Xanax   aspirin 81 mg chewable tablet   atorvastatin 40 mg tablet; Commonly known as: Lipitor   b complex vitamins capsule   D3-2000 50 mcg (2,000 units) capsule; Generic drug: cholecalciferol   metoprolol tartrate 100 mg tablet; Commonly known as: Lopressor   * OXcarbazepine 600 mg tablet; Commonly known as: Trileptal   * OXcarbazepine 600 mg tablet; Commonly known as: Trileptal   PHENobarbital 64.8 mg tablet; Commonly known as: Luminal; Take 1 tablet   (64.8 mg) by mouth 2 times a day.   phenytoin  mg capsule; Commonly known as: Dilantin; TAKE TWO   CAPSULES BY MOUTH TWO TIMES A DAY   rivaroxaban 20 mg tablet; Commonly known as: Xarelto   sertraline 50 mg tablet; Commonly known as: Zoloft  * This list has 2 medication(s) that are the same as other medications   prescribed for you. Read the directions carefully, and ask your doctor or   other care provider to review them with you.     STOP taking these medications     amiodarone 200 mg tablet; Commonly known as: Pacerone   lisinopril 5 mg tablet   topiramate  50 mg tablet; Commonly known as: Topamax       Test Results Pending At Discharge  Pending Labs       No current pending labs.            Hospital Course           Dipesh Lopez MD   Physician  Internal Medicine     Progress Notes     Signed     Date of Service: 5/26/2025 12:31 PM     Signed       Expand All Collapse All    Hunter Dejesus is a 75 y.o. male on day 4 of admission presenting with Dizziness.        Subjective  5/26: Slow but progressive improvements noted. Awaiting precert for facility of [facility]  [place]. Patient more animated today, fully examined at bedside. Hemoglobin drop @ 10.6 from 11.3 today. No s/s of acute bleed at time of exam,  will continue to monitor overnight, ambulate patient with PT,  orthostatic vitals, repeat labs in the AM. Continue with strict intake and output, fall precautions. Continues with episodes of dizziness, will continue with orthostatic vitals.     Objective  Last Recorded Vitals  /60   Pulse 60   Temp 36.1 °C (97 °F)   Resp 17   Wt 125 kg (275 lb)   SpO2 94%   Intake/Output last 3 Shifts:     Intake/Output Summary (Last 24 hours) at 5/26/2025 1231  Last data filed at 5/26/2025 0934      Gross per 24 hour   Intake 400 ml   Output 1055 ml   Net -655 ml         Admission Weight  Weight: 125 kg (275 lb) (05/20/25 0748)     Daily Weight  05/20/25 : 125 kg (275 lb)     Image Results  Vascular US Carotid Artery Duplex Bilateral             [place] [id number]  Tel [phone] and Fax [phone]        Vascular Lab Report  [facility] US CAROTID ARTERY DUPLEX BILATERAL        Patient Name:      HUNTER DEJESUS          Reading Physician:  [id number] Jacey Sorto MD, RPVI  Study Date:        5/20/2025            Ordering Physician: [id number] GIANCARLO PASCUAL  MRN/PID:           [id number]             Technologist:       Mira  Giovanna [facility]  Accession#:        [id number]         Technologist 2:  Date of Birth/Age: 1950 / 75 years Encounter#:         [id number]  Gender:            M  Admission Status:  Emergency            Location Performed: [facility]        Diagnosis/ICD: Dizziness and giddiness-R42  CPT Codes:     10327 Cerebrovascular Carotid Duplex scan complete        CONCLUSIONS:  Right Carotid: Findings are consistent with less than 50% stenosis of the right proximal internal carotid artery. Laminar flow seen by color Doppler. Right external carotid artery appears patent with no evidence of stenosis. No evidence of hemodynamically significant stenosis of the right common carotid artery. The right vertebral artery is patent with antegrade flow. No evidence of hemodynamically significant stenosis in the right subclavian artery.  Left Carotid: Findings are consistent with less than 50% stenosis of the left proximal internal carotid artery. Laminar flow seen by color Doppler. Left external carotid artery appears patent with no evidence of stenosis. No evidence of hemodynamically significant stenosis of the left common carotid artery. The left vertebral artery is patent with antegrade flow. No evidence of hemodynamically significant stenosis in the left subclavian artery.     Imaging & Doppler Findings:  Right Plaque Morph: The proximal right internal carotid artery demonstrates heterogenous plaque. The proximal right external carotid artery demonstrates heterogenous plaque. The distal right common carotid artery demonstrates heterogenous plaque.  Left Plaque Morph: The proximal left internal carotid artery demonstrates heterogenous plaque. The distal left common carotid artery demonstrates heterogenous plaque.      Right                        Left    PSV      EDV                PSV      EDV  89 cm/s            CCA P    97 cm/s  68 cm/s            CCA D    81 cm/s  68 cm/s  16 cm/s   ICA P    84 cm/s  20 cm/s  79 cm/s   21 cm/s   ICA M    74 cm/s  17 cm/s  83 cm/s  18 cm/s   ICA D    88 cm/s  24 cm/s  132 cm/s            ECA     101 cm/s  49 cm/s  11 cm/s Vertebral  69 cm/s  17 cm/s  206 cm/s         Subclavian 185 cm/s                   Right Left  ICA/CCA Ratio  1.0  1.0           [id number] Jacey Sorto MD, RPVI  Electronically signed by [id number] Jacey Sorto MD, RPVI on 5/22/2025 at 12:02:53 AM        ** Final **        Physical Exam     General: in no acute distress  Eyes: PERRLA   HENT: Normo-cephalic, atraumatic.   CV: Regular rate, regular rhythm.   Resp: Breathing non-labored,  diminished to auscultation bilaterally  GI: BS x4   : monitor intake and output  MSK/Extremities: No gross bony deformities. PT/OT on the case  Skin: Warm. Appropriate color, continue offloading  Neuro:  Face symmetric.   Psych: returning to baseline, improved      10 point ROS negative unless otherwise noted in HPI     Patient fully evaluated 5/26  for    Problem List Items Addressed This Visit         * (Principal) Dizziness - Primary     Relevant Orders     Phenytoin level, total (dilantin) (Completed)     Oxcarbazepine level (Completed)     Phenobarbital level (Completed)     Urinalysis with Reflex Culture and Microscopic (Completed)     CT abdomen pelvis wo IV contrast (Completed)     CT cervical spine wo IV contrast (Completed)     CBC and Auto Differential (Completed)     Magnesium (Completed)     Comprehensive metabolic panel (Completed)     CT head wo IV contrast (Completed)     CT lumbar spine retrospective reconstruction protocol (Completed)     CT thoracic spine wo IV contrast (Completed)     Urinalysis Microscopic (Completed)     Vascular US Carotid Artery Duplex Bilateral (Completed)     Transthoracic Echo Complete (Completed)     Difficulty walking     Relevant Orders     Phenytoin level, total (dilantin) (Completed)     Oxcarbazepine level (Completed)     Phenobarbital level (Completed)     Urinalysis with Reflex Culture and Microscopic  (Completed)     CT abdomen pelvis wo IV contrast (Completed)     CT cervical spine wo IV contrast (Completed)     CBC and Auto Differential (Completed)     Magnesium (Completed)     Comprehensive metabolic panel (Completed)     CT head wo IV contrast (Completed)     CT lumbar spine retrospective reconstruction protocol (Completed)     CT thoracic spine wo IV contrast (Completed)     Urinalysis Microscopic (Completed)     Vascular US Carotid Artery Duplex Bilateral (Completed)     Transthoracic Echo Complete (Completed)      Other Visit Diagnoses           Drug-induced dizziness         Relevant Orders     Transthoracic Echo Complete (Completed)       Encounter for other specified special examinations           Encounter for other preprocedural examination                 Patient seen resting in bed with head of bed elevated, no s/s or c/o acute difficulties at this time.  Vital signs for last 24 hours Temp:  [35.6 °C (96.1 °F)-36.4 °C (97.5 °F)] 36.1 °C (97 °F)  Heart Rate:  [60-73] 60  Resp:  [16-18] 17  BP: (106-159)/(51-70) 114/60    I/O this shift:  In: 200 [P.O.:200]  Out: 180 [Urine:180]  Patient still requiring frequent cardiac and SPO2 monitoring. Continue aggressive pulmonary hygiene and oral hygiene. Off loading as tolerated for skin integrity. Medications and labs reviewed-         Results for orders placed or performed during the hospital encounter of 05/20/25 (from the past 24 hours)   Comprehensive Metabolic Panel   Result Value Ref Range     Glucose 86 74 - 99 mg/dL     Sodium 137 136 - 145 mmol/L     Potassium 3.9 3.5 - 5.3 mmol/L     Chloride 103 98 - 107 mmol/L     Bicarbonate 27 21 - 32 mmol/L     Anion Gap 11 10 - 20 mmol/L     Urea Nitrogen 12 6 - 23 mg/dL     Creatinine 0.67 0.50 - 1.30 mg/dL     eGFR >90 >60 mL/min/1.73m*2     Calcium 8.6 8.6 - 10.3 mg/dL     Albumin 3.3 (L) 3.4 - 5.0 g/dL     Alkaline Phosphatase 51 33 - 136 U/L     Total Protein 5.6 (L) 6.4 - 8.2 g/dL     AST 15 9 - 39 U/L      Bilirubin, Total 0.4 0.0 - 1.2 mg/dL     ALT 12 10 - 52 U/L   CBC and Auto Differential   Result Value Ref Range     WBC 5.0 4.4 - 11.3 x10*3/uL     nRBC 0.0 0.0 - 0.0 /100 WBCs     RBC 3.11 (L) 4.50 - 5.90 x10*6/uL     Hemoglobin 10.6 (L) 13.5 - 17.5 g/dL     Hematocrit 31.6 (L) 41.0 - 52.0 %      (H) 80 - 100 fL     MCH 34.1 (H) 26.0 - 34.0 pg     MCHC 33.5 32.0 - 36.0 g/dL     RDW 13.6 11.5 - 14.5 %     Platelets 185 150 - 450 x10*3/uL     Neutrophils % 61.8 40.0 - 80.0 %     Immature Granulocytes %, Automated 0.2 0.0 - 0.9 %     Lymphocytes % 21.6 13.0 - 44.0 %     Monocytes % 8.6 2.0 - 10.0 %     Eosinophils % 7.0 0.0 - 6.0 %     Basophils % 0.8 0.0 - 2.0 %     Neutrophils Absolute 3.08 1.60 - 5.50 x10*3/uL     Immature Granulocytes Absolute, Automated 0.01 0.00 - 0.50 x10*3/uL     Lymphocytes Absolute 1.08 0.80 - 3.00 x10*3/uL     Monocytes Absolute 0.43 0.05 - 0.80 x10*3/uL     Eosinophils Absolute 0.35 0.00 - 0.40 x10*3/uL     Basophils Absolute 0.04 0.00 - 0.10 x10*3/uL      Patient recently received an antibiotic (last 12 hours)         None             Plan discussed with interdisciplinary team, continue current and repeat labs in the AM.         Discharge planning discussed with patient and care team. Therapy evaluations ordered.   [facility]- 18  Anticipate - Rehab      Patient aware and agreeable to current plan, continue plan as above.      I spent a total of 60 minutes on the date of the service which included preparing to see the patient, face-to-face patient care, completing clinical documentation, obtaining and/or reviewing separately obtained history, performing a medically appropriate examination, counseling and educating the patient/family/caregiver, ordering medications, tests, or procedures, communicating with other HCPs (not separately reported), independently interpreting results (not separately reported), communicating results to the patient/family/caregiver, and care coordination  (not separately reported).                           Assessment & Plan  Dizziness     Multiple falls     Difficulty walking     Lewy body dementia (Multi)     HTN (hypertension)     Pulmonary nodule  Tylenol 650 mg twice daily given for headaches.  Transderm scopolamine for dizziness.  Recheck labs in AM.  Monitor overnight.           Nguyen August                    Revision History     Patient fully evaluated 05/27 for   Assessment & Plan  Dizziness    Multiple falls    Difficulty walking    Lewy body dementia (Multi)    HTN (hypertension)    Pulmonary nodule  Improved with Tylenol 650 mg twice daily given for headaches.  Transderm scopolamine for dizziness.      Patient with significant clinical improvement noted, patient medically cleared for discharge today. Patient seen resting in bed with head of bed elevated, no s/s or c/o acute difficulties at this time. Vital signs for last 24 hours:  Temp:  [35.5 °C (95.9 °F)-36.2 °C (97.2 °F)] 36 °C (96.8 °F)  Heart Rate:  [60-63] 60  Resp:  [16-18] 18  BP: (100-150)/(47-69) 139/65 Medications and labs reviewed-   Results for orders placed or performed during the hospital encounter of 05/20/25 (from the past 24 hours)   Comprehensive Metabolic Panel   Result Value Ref Range    Glucose 82 74 - 99 mg/dL    Sodium 138 136 - 145 mmol/L    Potassium 3.9 3.5 - 5.3 mmol/L    Chloride 103 98 - 107 mmol/L    Bicarbonate 29 21 - 32 mmol/L    Anion Gap 10 10 - 20 mmol/L    Urea Nitrogen 14 6 - 23 mg/dL    Creatinine 0.74 0.50 - 1.30 mg/dL    eGFR >90 >60 mL/min/1.73m*2    Calcium 8.6 8.6 - 10.3 mg/dL    Albumin 3.4 3.4 - 5.0 g/dL    Alkaline Phosphatase 54 33 - 136 U/L    Total Protein 5.7 (L) 6.4 - 8.2 g/dL    AST 15 9 - 39 U/L    Bilirubin, Total 0.4 0.0 - 1.2 mg/dL    ALT 12 10 - 52 U/L   CBC and Auto Differential   Result Value Ref Range    WBC 5.5 4.4 - 11.3 x10*3/uL    nRBC 0.0 0.0 - 0.0 /100 WBCs    RBC 3.08 (L) 4.50 - 5.90 x10*6/uL    Hemoglobin 11.1 (L) 13.5 - 17.5 g/dL     Hematocrit 32.1 (L) 41.0 - 52.0 %     (H) 80 - 100 fL    MCH 36.0 (H) 26.0 - 34.0 pg    MCHC 34.6 32.0 - 36.0 g/dL    RDW 13.8 11.5 - 14.5 %    Platelets 218 150 - 450 x10*3/uL    Neutrophils % 56.2 40.0 - 80.0 %    Immature Granulocytes %, Automated 0.4 0.0 - 0.9 %    Lymphocytes % 26.4 13.0 - 44.0 %    Monocytes % 8.6 2.0 - 10.0 %    Eosinophils % 7.3 0.0 - 6.0 %    Basophils % 1.1 0.0 - 2.0 %    Neutrophils Absolute 3.09 1.60 - 5.50 x10*3/uL    Immature Granulocytes Absolute, Automated 0.02 0.00 - 0.50 x10*3/uL    Lymphocytes Absolute 1.45 0.80 - 3.00 x10*3/uL    Monocytes Absolute 0.47 0.05 - 0.80 x10*3/uL    Eosinophils Absolute 0.40 0.00 - 0.40 x10*3/uL    Basophils Absolute 0.06 0.00 - 0.10 x10*3/uL      Patient recently received an antibiotic (last 12 hours)       None           No results found for the last 90 days.       Continue aggressive pulmonary hygiene and oral hygiene. Off loading as tolerated for skin integrity. No new complaints per patient, stable at time of exam.  Plan discussed with interdisciplinary team, no acute events overnight, patient denies chest pain, worsening SOB at this time. Ok to discharge, will continue current and repeat labs in the AM if patient still hospitalized. Patient aware and agreeable to current plan, continue plan as above.     I spent 60 minutes on the date of the service which included preparing to see the patient, face-to-face patient care, completing clinical documentation, obtaining and/or reviewing separately obtained history, performing a medically appropriate examination, counseling and educating the patient/family/caregiver, ordering medications, tests, or procedures, communicating with other HCPs (not separately reported), independently interpreting results (not separately reported), communicating results to the patient/family/caregiver, and care coordination (not separately reported  Pertinent Physical Exam At Time of Discharge  Physical  Exam    Outpatient Follow-Up  No future appointments.      Nguyen August

## 2025-05-27 NOTE — PROGRESS NOTES
"Physical Therapy    Physical Therapy Treatment    Patient Name: Jose Luis Dejesus  MRN: [id number]  Department: [facility]  Room: [address] [place] [address]  Today's Date: 5/27/2025  Time Calculation  Start Time: 1015  Stop Time: 1038  Time Calculation (min): 23 min         Assessment/Plan   PT Assessment  End of Session Communication: Bedside nurse  End of Session Patient Position: Alarm on, Bed, 3 rail up (call light and needs within reach.)     PT Plan  Treatment/Interventions: Bed mobility, Transfer training, Gait training, Balance training, Strengthening, Endurance training, Range of motion, Therapeutic exercise, Therapeutic activity, Home exercise program, Positioning  PT Plan: Ongoing PT  PT Frequency: 3 times per week  PT Discharge Recommendations: Moderate intensity level of continued care  PT - OK to Discharge: Yes    PT Visit Info:  PT Received On: 05/27/25     General Visit Information:   General  Prior to Session Communication: Bedside nurse  Patient Position Received: Bed, 2 rail up, Alarm on  General Comment: Patient agreeable to therapy.    Subjective   Precautions:  Precautions  Hearing/Visual Limitations: Jackson left ear  Medical Precautions: Fall precautions    Objective   Pain:  Pain Assessment  Pain Assessment: 0-10  0-10 (Numeric) Pain Score: 0 - No pain  Cognition:  Cognition  Overall Cognitive Status: Within Functional Limits  Treatments:  Therapeutic Exercise  Therapeutic Exercise Performed: Yes  Therapeutic Exercise Activity 1: Patient performed seated therex,BLE: APs, hip flexion, and LAQ all [id number].    Therapeutic Activity  Therapeutic Activity Performed: Yes  Therapeutic Activity 1: Patient performed side stepping at EOB towards R with ModA x1 and FWW for support. Patient with noted tremoring in LEs, patient stating \"My knees are really weak.\" Unsteady however, no episodes of knee buckling or LOB.    Bed Mobility  Bed Mobility: Yes  Bed Mobility 1  Bed Mobility 1: Supine to sitting  Level of Assistance 1: Minimum " assistance  Bed Mobility Comments 1: HOB elevated  Bed Mobility 2  Bed Mobility  2: Sitting to supine  Level of Assistance 2: Minimum assistance  Bed Mobility Comments 2: HOB flat, assist with R LE.    Transfers  Transfer: Yes  Transfer 1  Trials/Comments 1: Patient performed sit<>stand transfer with Ervin x1 from elevated EOB. Cues for hand placement and sequencing.    Outcome Measures:  [facility] Basic Mobility  Turning from your back to your side while in a flat bed without using bedrails: A little  Moving from lying on your back to sitting on the side of a flat bed without using bedrails: A little  Moving to and from bed to chair (including a wheelchair): A little  Standing up from a chair using your arms (e.g. wheelchair or bedside chair): A little  To walk in hospital room: A lot  Climbing 3-5 steps with railing: Total  Basic Mobility - Total Score: 15    Education Documentation  Handouts, taught by Kenya High PTA at 5/27/2025 10:43 AM.  Learner: Patient  Readiness: Acceptance  Method: Explanation  Response: Verbalizes Understanding  Comment: Educated patient on transfer training techniques to maximize safety and independence.    Precautions, taught by Kenya High PTA at 5/27/2025 10:43 AM.  Learner: Patient  Readiness: Acceptance  Method: Explanation  Response: Verbalizes Understanding  Comment: Educated patient on transfer training techniques to maximize safety and independence.    Body Mechanics, taught by Kenya High PTA at 5/27/2025 10:43 AM.  Learner: Patient  Readiness: Acceptance  Method: Explanation  Response: Verbalizes Understanding  Comment: Educated patient on transfer training techniques to maximize safety and independence.    Home Exercise Program, taught by Kenya High PTA at 5/27/2025 10:43 AM.  Learner: Patient  Readiness: Acceptance  Method: Explanation  Response: Verbalizes Understanding  Comment: Educated patient on transfer training techniques to maximize safety and  independence.    Mobility Training, taught by Kenya High PTA at 5/27/2025 10:43 AM.  Learner: Patient  Readiness: Acceptance  Method: Explanation  Response: Verbalizes Understanding  Comment: Educated patient on transfer training techniques to maximize safety and independence.    Education Comments  No comments found.        OP EDUCATION:       Encounter Problems       Encounter Problems (Active)       PT Problem       PT Goal 1 STG - Pt will transition supine <> sitting with Ervin  (Met)       Start:  05/21/25    Expected End:  06/04/25    Resolved:  05/27/25         PT Goal 2 STG - Pt will transfer STS with Ervin  (Met)       Start:  05/21/25    Expected End:  06/04/25    Resolved:  05/27/25         PT Goal 3 STG - Pt will amb 50' using FWW with Ervin  (Progressing)       Start:  05/21/25    Expected End:  06/04/25            PT Goal 4 STG - Pt will perform a B LE ther ex program of 2-3 sets of 10  (Progressing)       Start:  05/21/25    Expected End:  06/04/25               Pain - Adult

## 2025-05-27 NOTE — ASSESSMENT & PLAN NOTE
Improved with Tylenol 650 mg twice daily given for headaches.  Transderm scopolamine for dizziness.

## 2025-05-27 NOTE — PROGRESS NOTES
Occupational Therapy    Occupational Therapy Treatment    Name: Jose Luis Dejesus  MRN: [id number]  Department: [facility]  Room: [address] [place] [address]  Date: 05/27/25  Time Calculation  Start Time: 1303  Stop Time: 1320  Time Calculation (min): 17 min    Assessment:  OT Assessment: Pt. progressing toward functional goals, will continue with OT treatment plan to address goals/promote independence with adl/transfers/mobility.  Prognosis: Good  Barriers to Discharge Home: Physical needs  End of Session Communication: Bedside nurse  End of Session Patient Position: Alarm on, Bed, 3 rail up  Plan:  Treatment Interventions: ADL retraining, Functional transfer training, Patient/family training, Equipment evaluation/education, Compensatory technique education, Endurance training (energy conservation / diaphragmatic breathing techniques training)  OT Frequency: 3 times per week  OT Discharge Recommendations: Moderate intensity level of continued care  OT - OK to Discharge: Yes    Subjective     OT Visit Info:  OT Received On: 05/27/25  General:  General  Co-Treatment: PT  Co-Treatment Reason: to maximize patient safety/abilities  Prior to Session Communication: Bedside nurse  Patient Position Received: Bed, 3 rail up, Alarm on  General Comment: pt. agreeable to therapy interventions  Precautions:  Precautions Comment: tele, dizziness (per RN, physician present states is medication related)           Pain Assessment:  Pain Assessment  Pain Assessment: 0-10  0-10 (Numeric) Pain Score: 0 - No pain    Objective   Cognition: a&o x 3     Functional Standing Tolerance:  Functional Standing Tolerance  Functional Standing Tolerance Comments: pt. unable to maintain standing with wh. walker and mod assist x 1 due to dizziness  Bed Mobility/Transfers: Bed Mobility  Bed Mobility:  (supine to sit:  sba with extra time, sit to supine:  min assist x 1 with BLE's)    Transfers  Transfer:  (sit<> stand from eob:  mod assist x 1)         Sitting Balance:  Dynamic  Sitting Balance  Dynamic Sitting-Comments: pt. able to tolerate 10 minutes, sba     Therapy/Activity: Therapeutic Exercise  Therapeutic Exercise Performed:  (pt. able to complete bue ther ex sitting eob, 10 reps each bicep curls/hand pumps/shoulder shrugs/trunk rotation, tolerated fair due to dizziness with rest periods)      Outcome Measures:  [facility] Daily Activity  Putting on and taking off regular lower body clothing: Total  Bathing (including washing, rinsing, drying): Total  Putting on and taking off regular upper body clothing: A lot  Toileting, which includes using toilet, bedpan or urinal: Total  Taking care of personal grooming such as brushing teeth: A little  Eating Meals: None  Daily Activity - Total Score: 12        Education Documentation  Body Mechanics, taught by Nguyen Paredes OT at 5/27/2025  1:37 PM.  Learner: Patient  Readiness: Acceptance  Method: Explanation  Response: Verbalizes Understanding, Needs Reinforcement  Comment: ther ex technique    Precautions, taught by Nguyen Paredes OT at 5/27/2025  1:37 PM.  Learner: Patient  Readiness: Acceptance  Method: Explanation  Response: Verbalizes Understanding, Needs Reinforcement  Comment: ther ex technique    ADL Training, taught by Nguyen Paredes OT at 5/27/2025  1:37 PM.  Learner: Patient  Readiness: Acceptance  Method: Explanation  Response: Verbalizes Understanding, Needs Reinforcement  Comment: ther ex technique        Goals:  Encounter Problems       Encounter Problems (Active)       OT Goals       Patient will complete upper and lower body bathing/dressing; toileting with supervision using assistive techniques/adaptive equipment as needed  (Progressing)       Start:  05/21/25    Expected End:  06/04/25            Patient will perform bed mobility and functional transfers safely with supervision:  bed, chair, commode using DME as needed  (Progressing)       Start:  05/21/25    Expected End:  06/04/25            Patient will apply energy  conservation/diaphragmatic breathing techniques to ADL's and functional transfers with minimal cues  (Progressing)       Start:  05/21/25    Expected End:  06/04/25            Patient will tolerate standing for 3 mins. in prep during ADL's and functional transfers/mobility  (Progressing)       Start:  05/21/25    Expected End:  06/04/25

## 2025-06-26 ENCOUNTER — NURSING HOME VISIT (OUTPATIENT)
Dept: POST ACUTE CARE | Facility: EXTERNAL LOCATION | Age: 75
End: 2025-06-26
Payer: MEDICARE

## 2025-06-26 DIAGNOSIS — A41.9 SEPSIS WITH METABOLIC ENCEPHALOPATHY (MULTI): ICD-10-CM

## 2025-06-26 DIAGNOSIS — G31.83 LEWY BODY DEMENTIA, UNSPECIFIED DEMENTIA SEVERITY, UNSPECIFIED WHETHER BEHAVIORAL, PSYCHOTIC, OR MOOD DISTURBANCE OR ANXIETY: ICD-10-CM

## 2025-06-26 DIAGNOSIS — G40.909 SEIZURE DISORDER (MULTI): ICD-10-CM

## 2025-06-26 DIAGNOSIS — N17.9 AKI (ACUTE KIDNEY INJURY): ICD-10-CM

## 2025-06-26 DIAGNOSIS — I50.9 CONGESTIVE HEART FAILURE, UNSPECIFIED HF CHRONICITY, UNSPECIFIED HEART FAILURE TYPE: ICD-10-CM

## 2025-06-26 DIAGNOSIS — J96.91 RESPIRATORY FAILURE WITH HYPOXIA, UNSPECIFIED CHRONICITY: Primary | ICD-10-CM

## 2025-06-26 DIAGNOSIS — G93.41 SEPSIS WITH METABOLIC ENCEPHALOPATHY (MULTI): ICD-10-CM

## 2025-06-26 DIAGNOSIS — R65.20 SEPSIS WITH METABOLIC ENCEPHALOPATHY (MULTI): ICD-10-CM

## 2025-06-26 DIAGNOSIS — F02.80 LEWY BODY DEMENTIA, UNSPECIFIED DEMENTIA SEVERITY, UNSPECIFIED WHETHER BEHAVIORAL, PSYCHOTIC, OR MOOD DISTURBANCE OR ANXIETY: ICD-10-CM

## 2025-06-26 PROCEDURE — 99306 1ST NF CARE HIGH MDM 50: CPT | Performed by: EMERGENCY MEDICINE

## 2025-06-26 PROCEDURE — 99497 ADVNCD CARE PLAN 30 MIN: CPT | Performed by: EMERGENCY MEDICINE

## 2025-06-26 NOTE — PROGRESS NOTES
Joes Luis Dejesus   75 y.o.  male  @location@            Assessment and Plan:  History and physical    Diagnosis: Acute hypoxic respiratory failure - IVO12-XS J96.01, Medical, Acute lower urinary tract infection - FAQ72-IY N39.0, Medical, Acute metabolic encephalopathy - EUS95-RZ G93.41, Medical, Acute renal failure - ETA48-PS N17.9, Medical, RAFA (acute kidney injury) - CAY31-KB N17.9, Medical, Demand ischemia of myocardium - IHZ73-UU I24.89, Medical, General weakness - CNC56-JW R53.1, Medical, History of seizure - YZG44-TS Z87.898, Medical, Hyperphosphatemia - IBP57-VR E83.39, Medical, Hypertensive urgency - VPA91-PM I16.0, Medical, Hypokalemia - LVN03-BP E87.6, Medical, Hypomagnesemia - IBA63-CM E83.42, Medical, Lewy body dementia - NMG97-VI G31.83, Medical, Seizure disorder - JCB85-DG G40.909, Medical, Severe sepsis with septic shock - MGI36-JQ R65.21, Medical, Suspected acute heart failure with preserved ejection fraction (HFpEF) - RDT48-QD I50.31, Medical, Suspected left lower lobe pneumonia - UPF83-HW J18.9, Medical, Uremic encephalopathy - OQF80-UW G93.49, Medical.      Medications (22) Active  acetaminophen 325 mg oral tablet 650 mg = 2 tabs, PRN, ORAL, [id number]  Aricept 10 mg oral tablet 10 mg = 1 tabs, ORAL, DAILY  aspirin 81 mg oral tablet, chewable 81 mg = 1 tabs, ORAL, DAILY  Colace 100 mg oral capsule 100 mg = 1 caps, PRN, ORAL, DAILY  Eliquis 2.5 mg oral tablet 2.5 mg = 1 tabs, ORAL, BID  hydrALAZINE 50 mg oral tablet 50 mg = 1 tabs, ORAL, BIDWM  Imodium A-D 2 mg oral tablet 2 mg = 1 tabs, PRN, ORAL, DAILY  Lipitor 40 mg oral tablet 40 mg = 1 tabs, ORAL, DAILY  meclizine 25 mg oral tablet 25 mg = 1 tabs, ORAL, QID  meclizine 25 mg oral tablet 25 mg = 1 tabs, PRN, ORAL, [id number]  metoprolol tartrate 25 mg oral tablet 12.5 mg = 0.5 tabs, ORAL, BID  Norvasc 5 mg oral tablet 5 mg = 1 tabs, ORAL, DAILY  PHENobarbital 32.4 mg oral tablet 32.4 mg = 1 tabs, ORAL, BID  phenytoin 100 mg oral capsule, extended release  200 mg = 2 caps, ORAL, BID  predniSONE 5 mg oral tablet 5 mg = 1 tabs, ORAL, DAILY WITH BREAKFAST  Protonix 40 mg oral delayed release tablet 40 mg = 1 tabs, ORAL, DAILY  Trileptal 150 mg oral tablet 450 mg = 3 tabs, ORAL, QHS  Trileptal 600 mg oral tablet 600 mg = 1 tabs, ORAL, DAILY  Vitamin B Complex oral tablet 1 tabs, ORAL, DAILY  Vitamin D3 50 mcg (2000 intl units) oral tablet 50 mcg = 1 tabs, ORAL, DAILY  Xanax 0.25 mg oral tablet 0.25 mg = 1 tabs, ORAL, DAILY  Zoloft 50 mg oral tablet 50 mg = 1 tabs, ORAL, BID        Hospital Course   Clinical Summary   ..       A 75 year old male with past medical history of A-fib, CAD, HLD, HTN, Dementia and Lewy body disease, presented to the ER with fever and generalized weakness. This was associated with chills, rigors, shortness of breath, cough, and confusion. On arrival to the hospital, patient was found to be febrile with temperature of 39.5 C, tachycardic with heart rate of 106, hypotensive with BP of 93/51, and tachypneic with respiratory rate of 28. Patient was placed on 2L/min O2 per NC. In the ER, blood work showed hypokalemia with potassium level of 3.1, hypomagnesemia with magnesium level of 1.3, elevated troponin, leukopenia with WBCs of 2.3, and nasopharyngeal swabs were negative for COVID and flu. Urinalysis showed pyuria, and urine culture showed E. coli. CXR 6/8/2025 showed left lower lobe infiltrate vs pneumonia, CT lung/mediastinum 6/8/25 showed small bilateral pleural effusions, and right solid pulmonary nodule within the upper lobe measuring 8 mm. CT brain 6/8/25 was negative for acute intracranial abnormality and showed mild generalized cerebral atrophy secondary to chronic ischemic small vessel disease. CT abdomen/pelvis 6/8/25 showed no acute process and was unremarkable. Patient was admitted as a case of severe sepsis with septic shock, acute renal failure, uremic encephalopathy, hypertensive urgency, acute UTI, left lower lobe pneumonia, acute  metabolic encephalopathy, and demand myocardial ischemia. Patient was treated with Metoprolol, IV Rocephin (held), IV Azithromycin (held), IV Zosyn (held), IV Levophed infusion (held), IV Vasopressin infusion (held), IV fluids; PT/OT, ID, cardiology, neurology, nephrology, and ICU team on consult. Patient developed worsening mental status due to uremic encephalopathy, repeat CT brain 6/11/25 was negative for acute intracranial abnormality, and patient returned to baseline mentation after initiating dialysis. ECHO showed EF 55-60%, repeat CT lung/mediastinum 6/11/25 showed increased mild bibasilar atelectasis, and slightly increased small bilateral pleural effusions, CXR 6/14/25 showed increased bibasilar interstitial and patchy opacities, and patient was successfully weaned off supplemental oxygen and tolerated room air for several days prior to departure. PermaCath placement 6/16/25, and patient received several sessions of dialysis with improved kidney function. PT/OT recommended moderate intensity therapy and SNF, discharged to [facility] due to in-house dialysis availability.        I discussed advanced care planning for more than 16 minutes including the explanation and discussion of advanced directives. Information and advise was also provided on DO NOT RESUSCITATE and patient encouraged to consider this  Patient is not sure about DNR at this time.      Source of history: Nurse, Medical personnel, Medical record, Patient.  History limitation: None.    HPI:  History and physical    Patient is unable to give any detailed history and therefore history is obtained from the chart  No acute complaints voiced by the patient or acute concerns raised by nursing    History of hospitalization-    History of Present Illness     A 75 year old male with past medical history of A-fib, CAD, HLD, HTN, Dementia and Lewy body disease, presented to the ER with fever and generalized weakness. This was associated with chills,  rigors, shortness of breath, cough, and confusion. On arrival to the hospital, patient was found to be febrile with temperature of 39.5 C, tachycardic with heart rate of 106, hypotensive with BP of 93/51, and tachypneic with respiratory rate of 28. Patient was placed on 2L/min O2 per NC. In the ER, blood work showed hypokalemia with potassium level of 3.1, hypomagnesemia with magnesium level of 1.3, elevated troponin, leukopenia with WBCs of 2.3, and nasopharyngeal swabs were negative for COVID and flu. Urinalysis showed pyuria, and urine culture showed gram negative rods. CXR 6/8/2025 showed left lower lobe infiltrate vs pneumonia, CT lung/mediastinum 6/8/25 showed small bilateral pleural effusions, and right solid pulmonary nodule within the upper lobe measuring 8 mm. CT brain 6/8/25 was negative for acute intracranial abnormality and showed mild generalized cerebral atrophy secondary to chronic ischemic small vessel disease. CT abdomen/pelvis 6/8/25 showed no acute process and was unremarkable. Patient was admitted as a case of severe sepsis with septic shock, acute UTI, suspected left lower lobe pneumonia, acute metabolic encephalopathy, and demand myocardial ischemia. Patient was treated with IV Rocephin, IV Azithromycin, IV fluids; PT/OT, ID, and cardiology on consult.       Histories   Past Medical History:   A-fib  CAD  HLD  HTN  Lewy body disease    Problem List/Past Medical History Ongoing Afib CAD (coronary artery disease) HLD (hyperlipidemia) HTN (hypertension) MI (myocardial infarction) Osteoarthritis of left hip Seizure disorder Procedure/Surgical History   Cardioversion. (03/21/2024)   Transesophageal Echocardiogram. (03/21/2024)   Dual Chamber Pacer & Lead Insertion. (03/18/2024)   LEFT TOTAL HIP (03/13/2024)   Cardiac ablation   Left knee arthroscopy   Right total hip replacement    Allergies No Known Allergies No Known Medication Allergies Social History   Alcohol - Denies Alcohol Use    Home/Environment Lives with:Spouse *Living Situation Prior to AdmissionHome with home health Home equipment:Walker/Cane *Special Services and Community Resources Prior to AdmissionNone *Mobility Assistance Prior to AdmissionIndependent *Will patient require additional/new services upon discharge?Yes   Substance Abuse - Denies Substance Abuse   Tobacco - Denies Tobacco Use Use:Former smoker, quit more than 30 days ago Type:Cigarettes Family History Asphyxiation: Father. Pneumonia: Mother.  Current Medications[1]    Physical Exam:  Vital signs as per nursing/MA documentation were reviewed  General appearance: Alert and in no acute distress  HEENT: Normal Inspection  Neck - Normal Inspection  Respiratory : No respiratory distress. Lungs are clear   Cardiovascular: heart rate normal. No gallop  Back - normal inspection  Skin inspection:Warm  Musculoskeletal : No deformities  Neuro : Limited exam. Baseline    ROS: Review of symptoms is negative except for what is mentioned in HPI    Results/Data  Records including but not limited to electronic medical records, relevant lab work and imaging from patient's health care facility encounter were reviewed and independently verified      Charting was completed using voice recognition technology and may include unintended errors.    Discussed with patient/family, RN, and NP.       [1]   Current Outpatient Medications   Medication Sig Dispense Refill    ALPRAZolam (Xanax) 0.25 mg tablet Take 1 tablet (0.25 mg) by mouth once daily in the morning.      aspirin 81 mg chewable tablet Chew 1 tablet (81 mg) once daily.      atorvastatin (Lipitor) 40 mg tablet Take 1 tablet (40 mg) by mouth once daily at bedtime.      b complex vitamins capsule Take 1 capsule by mouth once daily.      cholecalciferol (D3-2000) 50 mcg (2,000 units) capsule Take 1 capsule (50 mcg) by mouth once daily.      donepezil (Aricept) 10 mg tablet Please take 10 mg/day (Patient taking differently: Take 1 tablet  (10 mg) by mouth once daily in the morning.) 30 tablet 5    meclizine (Antivert) 25 mg tablet Take 1 tablet (25 mg) by mouth 3 times a day as needed (dizziness).      meclizine (Antivert) 25 mg tablet Take 1 tablet (25 mg) by mouth 4 times a day.      OXcarbazepine (Trileptal) 600 mg tablet Take 2 tablets (1,200 mg) by mouth once daily in the morning.      OXcarbazepine (Trileptal) 600 mg tablet Take 1.5 tablets (900 mg) by mouth once daily at bedtime.      PHENobarbital (Luminal) 64.8 mg tablet Take 1 tablet (64.8 mg) by mouth 2 times a day. 180 tablet 2    phenytoin ER (Dilantin) 100 mg capsule TAKE TWO CAPSULES BY MOUTH TWO TIMES A  capsule 0    propranolol LA (Inderal LA) 60 mg 24 hr capsule Take 1 capsule (60 mg) by mouth once daily. Do not crush, chew, or split.      rivaroxaban (Xarelto) 20 mg tablet Take 1 tablet (20 mg) by mouth once daily in the evening. Take with meals.      scopolamine (Transderm-Scop) 1 mg over 3 days patch 3 day Place 1 patch over 72 hours on the skin every 3rd day.      sertraline (Zoloft) 50 mg tablet Take 1 tablet (50 mg) by mouth 2 times a day.       No current facility-administered medications for this visit.

## 2025-06-26 NOTE — LETTER
Patient: Jose Luis Dejesus  : 1950    Encounter Date: 2025    Jose Luis Dejesus   75 y.o.  male  @location@            Assessment and Plan:  History and physical    Diagnosis: Acute hypoxic respiratory failure - HGU64-FN J96.01, Medical, Acute lower urinary tract infection - PHL17-GH N39.0, Medical, Acute metabolic encephalopathy - OOW87-NS G93.41, Medical, Acute renal failure - CFB22-CO N17.9, Medical, RAFA (acute kidney injury) - UWG57-PS N17.9, Medical, Demand ischemia of myocardium - FQG98-EB I24.89, Medical, General weakness - BAJ89-AF R53.1, Medical, History of seizure - XCE17-SE Z87.898, Medical, Hyperphosphatemia - IZQ38-FQ E83.39, Medical, Hypertensive urgency - YIO75-OP I16.0, Medical, Hypokalemia - AFB16-ED E87.6, Medical, Hypomagnesemia - CXW81-OO E83.42, Medical, Lewy body dementia - NPR20-YV G31.83, Medical, Seizure disorder - IJJ94-FH G40.909, Medical, Severe sepsis with septic shock - SHN56-KT R65.21, Medical, Suspected acute heart failure with preserved ejection fraction (HFpEF) - BXL93-SC I50.31, Medical, Suspected left lower lobe pneumonia - IOU21-ZH J18.9, Medical, Uremic encephalopathy - JBD65-RM G93.49, Medical.      Medications (22) Active  acetaminophen 325 mg oral tablet 650 mg = 2 tabs, PRN, ORAL, [id number]  Aricept 10 mg oral tablet 10 mg = 1 tabs, ORAL, DAILY  aspirin 81 mg oral tablet, chewable 81 mg = 1 tabs, ORAL, DAILY  Colace 100 mg oral capsule 100 mg = 1 caps, PRN, ORAL, DAILY  Eliquis 2.5 mg oral tablet 2.5 mg = 1 tabs, ORAL, BID  hydrALAZINE 50 mg oral tablet 50 mg = 1 tabs, ORAL, BIDWM  Imodium A-D 2 mg oral tablet 2 mg = 1 tabs, PRN, ORAL, DAILY  Lipitor 40 mg oral tablet 40 mg = 1 tabs, ORAL, DAILY  meclizine 25 mg oral tablet 25 mg = 1 tabs, ORAL, QID  meclizine 25 mg oral tablet 25 mg = 1 tabs, PRN, ORAL, [id number]  metoprolol tartrate 25 mg oral tablet 12.5 mg = 0.5 tabs, ORAL, BID  Norvasc 5 mg oral tablet 5 mg = 1 tabs, ORAL, DAILY  PHENobarbital 32.4 mg oral tablet 32.4 mg  = 1 tabs, ORAL, BID  phenytoin 100 mg oral capsule, extended release 200 mg = 2 caps, ORAL, BID  predniSONE 5 mg oral tablet 5 mg = 1 tabs, ORAL, DAILY WITH BREAKFAST  Protonix 40 mg oral delayed release tablet 40 mg = 1 tabs, ORAL, DAILY  Trileptal 150 mg oral tablet 450 mg = 3 tabs, ORAL, QHS  Trileptal 600 mg oral tablet 600 mg = 1 tabs, ORAL, DAILY  Vitamin B Complex oral tablet 1 tabs, ORAL, DAILY  Vitamin D3 50 mcg (2000 intl units) oral tablet 50 mcg = 1 tabs, ORAL, DAILY  Xanax 0.25 mg oral tablet 0.25 mg = 1 tabs, ORAL, DAILY  Zoloft 50 mg oral tablet 50 mg = 1 tabs, ORAL, BID        Hospital Course   Clinical Summary   ..       A 75 year old male with past medical history of A-fib, CAD, HLD, HTN, Dementia and Lewy body disease, presented to the ER with fever and generalized weakness. This was associated with chills, rigors, shortness of breath, cough, and confusion. On arrival to the hospital, patient was found to be febrile with temperature of 39.5 C, tachycardic with heart rate of 106, hypotensive with BP of 93/51, and tachypneic with respiratory rate of 28. Patient was placed on 2L/min O2 per NC. In the ER, blood work showed hypokalemia with potassium level of 3.1, hypomagnesemia with magnesium level of 1.3, elevated troponin, leukopenia with WBCs of 2.3, and nasopharyngeal swabs were negative for COVID and flu. Urinalysis showed pyuria, and urine culture showed E. coli. CXR 6/8/2025 showed left lower lobe infiltrate vs pneumonia, CT lung/mediastinum 6/8/25 showed small bilateral pleural effusions, and right solid pulmonary nodule within the upper lobe measuring 8 mm. CT brain 6/8/25 was negative for acute intracranial abnormality and showed mild generalized cerebral atrophy secondary to chronic ischemic small vessel disease. CT abdomen/pelvis 6/8/25 showed no acute process and was unremarkable. Patient was admitted as a case of severe sepsis with septic shock, acute renal failure, uremic  encephalopathy, hypertensive urgency, acute UTI, left lower lobe pneumonia, acute metabolic encephalopathy, and demand myocardial ischemia. Patient was treated with Metoprolol, IV Rocephin (held), IV Azithromycin (held), IV Zosyn (held), IV Levophed infusion (held), IV Vasopressin infusion (held), IV fluids; PT/OT, ID, cardiology, neurology, nephrology, and ICU team on consult. Patient developed worsening mental status due to uremic encephalopathy, repeat CT brain 6/11/25 was negative for acute intracranial abnormality, and patient returned to baseline mentation after initiating dialysis. ECHO showed EF 55-60%, repeat CT lung/mediastinum 6/11/25 showed increased mild bibasilar atelectasis, and slightly increased small bilateral pleural effusions, CXR 6/14/25 showed increased bibasilar interstitial and patchy opacities, and patient was successfully weaned off supplemental oxygen and tolerated room air for several days prior to departure. PermaCath placement 6/16/25, and patient received several sessions of dialysis with improved kidney function. PT/OT recommended moderate intensity therapy and SNF, discharged to [facility] due to in-house dialysis availability.        I discussed advanced care planning for more than 16 minutes including the explanation and discussion of advanced directives. Information and advise was also provided on DO NOT RESUSCITATE and patient encouraged to consider this  Patient is not sure about DNR at this time.      Source of history: Nurse, Medical personnel, Medical record, Patient.  History limitation: None.    HPI:  History and physical    Patient is unable to give any detailed history and therefore history is obtained from the chart  No acute complaints voiced by the patient or acute concerns raised by nursing    History of hospitalization-    History of Present Illness     A 75 year old male with past medical history of A-fib, CAD, HLD, HTN, Dementia and Lewy body disease, presented  to the ER with fever and generalized weakness. This was associated with chills, rigors, shortness of breath, cough, and confusion. On arrival to the hospital, patient was found to be febrile with temperature of 39.5 C, tachycardic with heart rate of 106, hypotensive with BP of 93/51, and tachypneic with respiratory rate of 28. Patient was placed on 2L/min O2 per NC. In the ER, blood work showed hypokalemia with potassium level of 3.1, hypomagnesemia with magnesium level of 1.3, elevated troponin, leukopenia with WBCs of 2.3, and nasopharyngeal swabs were negative for COVID and flu. Urinalysis showed pyuria, and urine culture showed gram negative rods. CXR 6/8/2025 showed left lower lobe infiltrate vs pneumonia, CT lung/mediastinum 6/8/25 showed small bilateral pleural effusions, and right solid pulmonary nodule within the upper lobe measuring 8 mm. CT brain 6/8/25 was negative for acute intracranial abnormality and showed mild generalized cerebral atrophy secondary to chronic ischemic small vessel disease. CT abdomen/pelvis 6/8/25 showed no acute process and was unremarkable. Patient was admitted as a case of severe sepsis with septic shock, acute UTI, suspected left lower lobe pneumonia, acute metabolic encephalopathy, and demand myocardial ischemia. Patient was treated with IV Rocephin, IV Azithromycin, IV fluids; PT/OT, ID, and cardiology on consult.       Histories   Past Medical History:   A-fib  CAD  HLD  HTN  Lewy body disease    Problem List/Past Medical History Ongoing Afib CAD (coronary artery disease) HLD (hyperlipidemia) HTN (hypertension) MI (myocardial infarction) Osteoarthritis of left hip Seizure disorder Procedure/Surgical History   Cardioversion. (03/21/2024)   Transesophageal Echocardiogram. (03/21/2024)   Dual Chamber Pacer & Lead Insertion. (03/18/2024)   LEFT TOTAL HIP (03/13/2024)   Cardiac ablation   Left knee arthroscopy   Right total hip replacement    Allergies No Known Allergies No Known  Medication Allergies Social History   Alcohol - Denies Alcohol Use   Home/Environment Lives with:Spouse *Living Situation Prior to AdmissionHome with home health Home equipment:Walker/Cane *Special Services and Community Resources Prior to AdmissionNone *Mobility Assistance Prior to AdmissionIndependent *Will patient require additional/new services upon discharge?Yes   Substance Abuse - Denies Substance Abuse   Tobacco - Denies Tobacco Use Use:Former smoker, quit more than 30 days ago Type:Cigarettes Family History Asphyxiation: Father. Pneumonia: Mother.  Current Medications[1]    Physical Exam:  Vital signs as per nursing/MA documentation were reviewed  General appearance: Alert and in no acute distress  HEENT: Normal Inspection  Neck - Normal Inspection  Respiratory : No respiratory distress. Lungs are clear   Cardiovascular: heart rate normal. No gallop  Back - normal inspection  Skin inspection:Warm  Musculoskeletal : No deformities  Neuro : Limited exam. Baseline    ROS: Review of symptoms is negative except for what is mentioned in HPI    Results/Data  Records including but not limited to electronic medical records, relevant lab work and imaging from patient's health care facility encounter were reviewed and independently verified      Charting was completed using voice recognition technology and may include unintended errors.    Discussed with patient/family, RN, and NP.      Electronically Signed By: Tyrel Liz MD   6/26/25  4:54 PM       [1]  Current Outpatient Medications   Medication Sig Dispense Refill   • ALPRAZolam (Xanax) 0.25 mg tablet Take 1 tablet (0.25 mg) by mouth once daily in the morning.     • aspirin 81 mg chewable tablet Chew 1 tablet (81 mg) once daily.     • atorvastatin (Lipitor) 40 mg tablet Take 1 tablet (40 mg) by mouth once daily at bedtime.     • b complex vitamins capsule Take 1 capsule by mouth once daily.     • cholecalciferol (D3-2000) 50 mcg (2,000 units) capsule Take 1  capsule (50 mcg) by mouth once daily.     • donepezil (Aricept) 10 mg tablet Please take 10 mg/day (Patient taking differently: Take 1 tablet (10 mg) by mouth once daily in the morning.) 30 tablet 5   • meclizine (Antivert) 25 mg tablet Take 1 tablet (25 mg) by mouth 3 times a day as needed (dizziness).     • meclizine (Antivert) 25 mg tablet Take 1 tablet (25 mg) by mouth 4 times a day.     • OXcarbazepine (Trileptal) 600 mg tablet Take 2 tablets (1,200 mg) by mouth once daily in the morning.     • OXcarbazepine (Trileptal) 600 mg tablet Take 1.5 tablets (900 mg) by mouth once daily at bedtime.     • PHENobarbital (Luminal) 64.8 mg tablet Take 1 tablet (64.8 mg) by mouth 2 times a day. 180 tablet 2   • phenytoin ER (Dilantin) 100 mg capsule TAKE TWO CAPSULES BY MOUTH TWO TIMES A  capsule 0   • propranolol LA (Inderal LA) 60 mg 24 hr capsule Take 1 capsule (60 mg) by mouth once daily. Do not crush, chew, or split.     • rivaroxaban (Xarelto) 20 mg tablet Take 1 tablet (20 mg) by mouth once daily in the evening. Take with meals.     • scopolamine (Transderm-Scop) 1 mg over 3 days patch 3 day Place 1 patch over 72 hours on the skin every 3rd day.     • sertraline (Zoloft) 50 mg tablet Take 1 tablet (50 mg) by mouth 2 times a day.       No current facility-administered medications for this visit.

## 2025-07-08 ENCOUNTER — NURSING HOME VISIT (OUTPATIENT)
Dept: POST ACUTE CARE | Facility: EXTERNAL LOCATION | Age: 75
End: 2025-07-08
Payer: MEDICARE

## 2025-07-08 DIAGNOSIS — G31.83 LEWY BODY DEMENTIA, UNSPECIFIED DEMENTIA SEVERITY, UNSPECIFIED WHETHER BEHAVIORAL, PSYCHOTIC, OR MOOD DISTURBANCE OR ANXIETY: ICD-10-CM

## 2025-07-08 DIAGNOSIS — I10 PRIMARY HYPERTENSION: ICD-10-CM

## 2025-07-08 DIAGNOSIS — A41.9 SEPSIS WITH METABOLIC ENCEPHALOPATHY (MULTI): Primary | ICD-10-CM

## 2025-07-08 DIAGNOSIS — G93.41 SEPSIS WITH METABOLIC ENCEPHALOPATHY (MULTI): Primary | ICD-10-CM

## 2025-07-08 DIAGNOSIS — F02.80 LEWY BODY DEMENTIA, UNSPECIFIED DEMENTIA SEVERITY, UNSPECIFIED WHETHER BEHAVIORAL, PSYCHOTIC, OR MOOD DISTURBANCE OR ANXIETY: ICD-10-CM

## 2025-07-08 DIAGNOSIS — R65.20 SEPSIS WITH METABOLIC ENCEPHALOPATHY (MULTI): Primary | ICD-10-CM

## 2025-07-08 DIAGNOSIS — N17.9 AKI (ACUTE KIDNEY INJURY): ICD-10-CM

## 2025-07-08 DIAGNOSIS — G40.909 SEIZURE DISORDER (MULTI): ICD-10-CM

## 2025-07-08 PROCEDURE — 99309 SBSQ NF CARE MODERATE MDM 30: CPT | Performed by: EMERGENCY MEDICINE

## 2025-07-08 NOTE — LETTER
Patient: Jose Luis Dejesus  : 1950    Encounter Date: 2025    Jose Luis Dejesus   75 y.o.  male  @location@            Assessment and Plan:      Diagnosis: Acute hypoxic respiratory failure - LVJ22-HI J96.01, Medical, Acute lower urinary tract infection - FQP92-PA N39.0, Medical, Acute metabolic encephalopathy - DOE07-TC G93.41, Medical, Acute renal failure - TJD60-OL N17.9, Medical, RAFA (acute kidney injury) - YEG88-UJ N17.9, Medical, Demand ischemia of myocardium - XRO43-BY I24.89, Medical, General weakness - EEL00-LP R53.1, Medical, History of seizure - DGG11-UV Z87.898, Medical, Hyperphosphatemia - GFZ79-HY E83.39, Medical, Hypertensive urgency - UNC69-FU I16.0, Medical, Hypokalemia - COO37-ZN E87.6, Medical, Hypomagnesemia - GHV09-SP E83.42, Medical, Lewy body dementia - LNQ50-BH G31.83, Medical, Seizure disorder - YWK62-AS G40.909, Medical, Severe sepsis with septic shock - BMV55-GO R65.21, Medical, Suspected acute heart failure with preserved ejection fraction (HFpEF) - SCH50-FX I50.31, Medical, Suspected left lower lobe pneumonia - SDE57-PX J18.9, Medical, Uremic encephalopathy - NBZ94-CN G93.49, Medical.      Medications (22) Active  acetaminophen 325 mg oral tablet 650 mg = 2 tabs, PRN, ORAL, [id number]  Aricept 10 mg oral tablet 10 mg = 1 tabs, ORAL, DAILY  aspirin 81 mg oral tablet, chewable 81 mg = 1 tabs, ORAL, DAILY  Colace 100 mg oral capsule 100 mg = 1 caps, PRN, ORAL, DAILY  Eliquis 2.5 mg oral tablet 2.5 mg = 1 tabs, ORAL, BID  hydrALAZINE 50 mg oral tablet 50 mg = 1 tabs, ORAL, BIDWM  Imodium A-D 2 mg oral tablet 2 mg = 1 tabs, PRN, ORAL, DAILY  Lipitor 40 mg oral tablet 40 mg = 1 tabs, ORAL, DAILY  meclizine 25 mg oral tablet 25 mg = 1 tabs, ORAL, QID  meclizine 25 mg oral tablet 25 mg = 1 tabs, PRN, ORAL, [id number]  metoprolol tartrate 25 mg oral tablet 12.5 mg = 0.5 tabs, ORAL, BID  Norvasc 5 mg oral tablet 5 mg = 1 tabs, ORAL, DAILY  PHENobarbital 32.4 mg oral tablet 32.4 mg = 1 tabs, ORAL,  BID  phenytoin 100 mg oral capsule, extended release 200 mg = 2 caps, ORAL, BID  predniSONE 5 mg oral tablet 5 mg = 1 tabs, ORAL, DAILY WITH BREAKFAST  Protonix 40 mg oral delayed release tablet 40 mg = 1 tabs, ORAL, DAILY  Trileptal 150 mg oral tablet 450 mg = 3 tabs, ORAL, QHS  Trileptal 600 mg oral tablet 600 mg = 1 tabs, ORAL, DAILY  Vitamin B Complex oral tablet 1 tabs, ORAL, DAILY  Vitamin D3 50 mcg (2000 intl units) oral tablet 50 mcg = 1 tabs, ORAL, DAILY  Xanax 0.25 mg oral tablet 0.25 mg = 1 tabs, ORAL, DAILY  Zoloft 50 mg oral tablet 50 mg = 1 tabs, ORAL, BID        Hospital Course   Clinical Summary   ..       A 75 year old male with past medical history of A-fib, CAD, HLD, HTN, Dementia and Lewy body disease, presented to the ER with fever and generalized weakness. This was associated with chills, rigors, shortness of breath, cough, and confusion. On arrival to the hospital, patient was found to be febrile with temperature of 39.5 C, tachycardic with heart rate of 106, hypotensive with BP of 93/51, and tachypneic with respiratory rate of 28. Patient was placed on 2L/min O2 per NC. In the ER, blood work showed hypokalemia with potassium level of 3.1, hypomagnesemia with magnesium level of 1.3, elevated troponin, leukopenia with WBCs of 2.3, and nasopharyngeal swabs were negative for COVID and flu. Urinalysis showed pyuria, and urine culture showed E. coli. CXR 6/8/2025 showed left lower lobe infiltrate vs pneumonia, CT lung/mediastinum 6/8/25 showed small bilateral pleural effusions, and right solid pulmonary nodule within the upper lobe measuring 8 mm. CT brain 6/8/25 was negative for acute intracranial abnormality and showed mild generalized cerebral atrophy secondary to chronic ischemic small vessel disease. CT abdomen/pelvis 6/8/25 showed no acute process and was unremarkable. Patient was admitted as a case of severe sepsis with septic shock, acute renal failure, uremic encephalopathy, hypertensive  urgency, acute UTI, left lower lobe pneumonia, acute metabolic encephalopathy, and demand myocardial ischemia. Patient was treated with Metoprolol, IV Rocephin (held), IV Azithromycin (held), IV Zosyn (held), IV Levophed infusion (held), IV Vasopressin infusion (held), IV fluids; PT/OT, ID, cardiology, neurology, nephrology, and ICU team on consult. Patient developed worsening mental status due to uremic encephalopathy, repeat CT brain 6/11/25 was negative for acute intracranial abnormality, and patient returned to baseline mentation after initiating dialysis. ECHO showed EF 55-60%, repeat CT lung/mediastinum 6/11/25 showed increased mild bibasilar atelectasis, and slightly increased small bilateral pleural effusions, CXR 6/14/25 showed increased bibasilar interstitial and patchy opacities, and patient was successfully weaned off supplemental oxygen and tolerated room air for several days prior to departure. PermaCath placement 6/16/25, and patient received several sessions of dialysis with improved kidney function. PT/OT recommended moderate intensity therapy and SNF, discharged to [facility] due to in-house dialysis availability.        -Fall prevention    -Cognitive engagement     -Monitor and treat blood pressure     -Aggressive decubitus ulcer prevention.     -Bowel and bladder care     -Optimal nutrition and supplementation as needed     -GI  and DVT prophylaxis     -Symptom control     -Ambulation as tolerated     -Will follow    Charting is done using voice recognition software and may contain errors which have not been completely corrected        Source of history: Nurse, Medical personnel, Medical record, Patient.  History limitation: None.    HPI:      Patient is unable to give any detailed history and therefore history is obtained from the chart  No acute complaints voiced by the patient or acute concerns raised by nursing    History of hospitalization-    History of Present Illness     A 75 year old  male with past medical history of A-fib, CAD, HLD, HTN, Dementia and Lewy body disease, presented to the ER with fever and generalized weakness. This was associated with chills, rigors, shortness of breath, cough, and confusion. On arrival to the hospital, patient was found to be febrile with temperature of 39.5 C, tachycardic with heart rate of 106, hypotensive with BP of 93/51, and tachypneic with respiratory rate of 28. Patient was placed on 2L/min O2 per NC. In the ER, blood work showed hypokalemia with potassium level of 3.1, hypomagnesemia with magnesium level of 1.3, elevated troponin, leukopenia with WBCs of 2.3, and nasopharyngeal swabs were negative for COVID and flu. Urinalysis showed pyuria, and urine culture showed gram negative rods. CXR 6/8/2025 showed left lower lobe infiltrate vs pneumonia, CT lung/mediastinum 6/8/25 showed small bilateral pleural effusions, and right solid pulmonary nodule within the upper lobe measuring 8 mm. CT brain 6/8/25 was negative for acute intracranial abnormality and showed mild generalized cerebral atrophy secondary to chronic ischemic small vessel disease. CT abdomen/pelvis 6/8/25 showed no acute process and was unremarkable. Patient was admitted as a case of severe sepsis with septic shock, acute UTI, suspected left lower lobe pneumonia, acute metabolic encephalopathy, and demand myocardial ischemia. Patient was treated with IV Rocephin, IV Azithromycin, IV fluids; PT/OT, ID, and cardiology on consult.       Histories   Past Medical History:   A-fib  CAD  HLD  HTN  Lewy body disease    Problem List/Past Medical History Ongoing Afib CAD (coronary artery disease) HLD (hyperlipidemia) HTN (hypertension) MI (myocardial infarction) Osteoarthritis of left hip Seizure disorder Procedure/Surgical History   Cardioversion. (03/21/2024)   Transesophageal Echocardiogram. (03/21/2024)   Dual Chamber Pacer & Lead Insertion. (03/18/2024)   LEFT TOTAL HIP (03/13/2024)   Cardiac  ablation   Left knee arthroscopy   Right total hip replacement    Allergies No Known Allergies No Known Medication Allergies Social History   Alcohol - Denies Alcohol Use   Home/Environment Lives with:Spouse *Living Situation Prior to AdmissionHome with home health Home equipment:Walker/Cane *Special Services and Community Resources Prior to AdmissionNone *Mobility Assistance Prior to AdmissionIndependent *Will patient require additional/new services upon discharge?Yes   Substance Abuse - Denies Substance Abuse   Tobacco - Denies Tobacco Use Use:Former smoker, quit more than 30 days ago Type:Cigarettes Family History Asphyxiation: Father. Pneumonia: Mother.  Current Medications[1]    Physical Exam:  Vital signs as per nursing/MA documentation were reviewed  General appearance: Alert and in no acute distress  HEENT: Normal Inspection  Neck - Normal Inspection  Respiratory : No respiratory distress. Lungs are clear   Cardiovascular: heart rate normal. No gallop  Back - normal inspection  Skin inspection:Warm  Musculoskeletal : No deformities  Neuro : Limited exam. Baseline    ROS: Review of symptoms is negative except for what is mentioned in HPI    Results/Data  Records including but not limited to electronic medical records, relevant lab work and imaging from patient's health care facility encounter were reviewed and independently verified      Charting was completed using voice recognition technology and may include unintended errors.    Discussed with patient/family, RN, and NP.        Electronically Signed By: Tyrel Liz MD   7/12/25  8:07 AM       [1]  Current Outpatient Medications   Medication Sig Dispense Refill   • ALPRAZolam (Xanax) 0.25 mg tablet Take 1 tablet (0.25 mg) by mouth once daily in the morning.     • aspirin 81 mg chewable tablet Chew 1 tablet (81 mg) once daily.     • atorvastatin (Lipitor) 40 mg tablet Take 1 tablet (40 mg) by mouth once daily at bedtime.     • b complex vitamins capsule  Take 1 capsule by mouth once daily.     • cholecalciferol (D3-2000) 50 mcg (2,000 units) capsule Take 1 capsule (50 mcg) by mouth once daily.     • donepezil (Aricept) 10 mg tablet Please take 10 mg/day (Patient taking differently: Take 1 tablet (10 mg) by mouth once daily in the morning.) 30 tablet 5   • meclizine (Antivert) 25 mg tablet Take 1 tablet (25 mg) by mouth 3 times a day as needed (dizziness).     • meclizine (Antivert) 25 mg tablet Take 1 tablet (25 mg) by mouth 4 times a day.     • OXcarbazepine (Trileptal) 600 mg tablet Take 2 tablets (1,200 mg) by mouth once daily in the morning.     • OXcarbazepine (Trileptal) 600 mg tablet Take 1.5 tablets (900 mg) by mouth once daily at bedtime.     • PHENobarbital (Luminal) 64.8 mg tablet Take 1 tablet (64.8 mg) by mouth 2 times a day. 180 tablet 2   • phenytoin ER (Dilantin) 100 mg capsule TAKE TWO CAPSULES BY MOUTH TWO TIMES A  capsule 0   • propranolol LA (Inderal LA) 60 mg 24 hr capsule Take 1 capsule (60 mg) by mouth once daily. Do not crush, chew, or split.     • rivaroxaban (Xarelto) 20 mg tablet Take 1 tablet (20 mg) by mouth once daily in the evening. Take with meals.     • scopolamine (Transderm-Scop) 1 mg over 3 days patch 3 day Place 1 patch over 72 hours on the skin every 3rd day.     • sertraline (Zoloft) 50 mg tablet Take 1 tablet (50 mg) by mouth 2 times a day.       No current facility-administered medications for this visit.

## 2025-07-12 NOTE — PROGRESS NOTES
Jose Luis Dejesus   75 y.o.  male  @location@            Assessment and Plan:      Diagnosis: Acute hypoxic respiratory failure - WRW65-JJ J96.01, Medical, Acute lower urinary tract infection - CMT73-WE N39.0, Medical, Acute metabolic encephalopathy - WHS00-BN G93.41, Medical, Acute renal failure - HVJ85-IL N17.9, Medical, RAFA (acute kidney injury) - UHP83-JA N17.9, Medical, Demand ischemia of myocardium - EPI50-QW I24.89, Medical, General weakness - CBZ60-PF R53.1, Medical, History of seizure - NBN23-CK Z87.898, Medical, Hyperphosphatemia - OBE58-MG E83.39, Medical, Hypertensive urgency - XUX01-QV I16.0, Medical, Hypokalemia - KMF94-WJ E87.6, Medical, Hypomagnesemia - LXY44-FL E83.42, Medical, Lewy body dementia - UJT21-UM G31.83, Medical, Seizure disorder - JHT06-UA G40.909, Medical, Severe sepsis with septic shock - LPF29-BQ R65.21, Medical, Suspected acute heart failure with preserved ejection fraction (HFpEF) - REE75-II I50.31, Medical, Suspected left lower lobe pneumonia - VNQ90-AB J18.9, Medical, Uremic encephalopathy - FEZ25-IZ G93.49, Medical.      Medications (22) Active  acetaminophen 325 mg oral tablet 650 mg = 2 tabs, PRN, ORAL, [id number]  Aricept 10 mg oral tablet 10 mg = 1 tabs, ORAL, DAILY  aspirin 81 mg oral tablet, chewable 81 mg = 1 tabs, ORAL, DAILY  Colace 100 mg oral capsule 100 mg = 1 caps, PRN, ORAL, DAILY  Eliquis 2.5 mg oral tablet 2.5 mg = 1 tabs, ORAL, BID  hydrALAZINE 50 mg oral tablet 50 mg = 1 tabs, ORAL, BIDWM  Imodium A-D 2 mg oral tablet 2 mg = 1 tabs, PRN, ORAL, DAILY  Lipitor 40 mg oral tablet 40 mg = 1 tabs, ORAL, DAILY  meclizine 25 mg oral tablet 25 mg = 1 tabs, ORAL, QID  meclizine 25 mg oral tablet 25 mg = 1 tabs, PRN, ORAL, [id number]  metoprolol tartrate 25 mg oral tablet 12.5 mg = 0.5 tabs, ORAL, BID  Norvasc 5 mg oral tablet 5 mg = 1 tabs, ORAL, DAILY  PHENobarbital 32.4 mg oral tablet 32.4 mg = 1 tabs, ORAL, BID  phenytoin 100 mg oral capsule, extended release 200 mg = 2 caps,  ORAL, BID  predniSONE 5 mg oral tablet 5 mg = 1 tabs, ORAL, DAILY WITH BREAKFAST  Protonix 40 mg oral delayed release tablet 40 mg = 1 tabs, ORAL, DAILY  Trileptal 150 mg oral tablet 450 mg = 3 tabs, ORAL, QHS  Trileptal 600 mg oral tablet 600 mg = 1 tabs, ORAL, DAILY  Vitamin B Complex oral tablet 1 tabs, ORAL, DAILY  Vitamin D3 50 mcg (2000 intl units) oral tablet 50 mcg = 1 tabs, ORAL, DAILY  Xanax 0.25 mg oral tablet 0.25 mg = 1 tabs, ORAL, DAILY  Zoloft 50 mg oral tablet 50 mg = 1 tabs, ORAL, BID        Hospital Course   Clinical Summary   ..       A 75 year old male with past medical history of A-fib, CAD, HLD, HTN, Dementia and Lewy body disease, presented to the ER with fever and generalized weakness. This was associated with chills, rigors, shortness of breath, cough, and confusion. On arrival to the hospital, patient was found to be febrile with temperature of 39.5 C, tachycardic with heart rate of 106, hypotensive with BP of 93/51, and tachypneic with respiratory rate of 28. Patient was placed on 2L/min O2 per NC. In the ER, blood work showed hypokalemia with potassium level of 3.1, hypomagnesemia with magnesium level of 1.3, elevated troponin, leukopenia with WBCs of 2.3, and nasopharyngeal swabs were negative for COVID and flu. Urinalysis showed pyuria, and urine culture showed E. coli. CXR 6/8/2025 showed left lower lobe infiltrate vs pneumonia, CT lung/mediastinum 6/8/25 showed small bilateral pleural effusions, and right solid pulmonary nodule within the upper lobe measuring 8 mm. CT brain 6/8/25 was negative for acute intracranial abnormality and showed mild generalized cerebral atrophy secondary to chronic ischemic small vessel disease. CT abdomen/pelvis 6/8/25 showed no acute process and was unremarkable. Patient was admitted as a case of severe sepsis with septic shock, acute renal failure, uremic encephalopathy, hypertensive urgency, acute UTI, left lower lobe pneumonia, acute metabolic  encephalopathy, and demand myocardial ischemia. Patient was treated with Metoprolol, IV Rocephin (held), IV Azithromycin (held), IV Zosyn (held), IV Levophed infusion (held), IV Vasopressin infusion (held), IV fluids; PT/OT, ID, cardiology, neurology, nephrology, and ICU team on consult. Patient developed worsening mental status due to uremic encephalopathy, repeat CT brain 6/11/25 was negative for acute intracranial abnormality, and patient returned to baseline mentation after initiating dialysis. ECHO showed EF 55-60%, repeat CT lung/mediastinum 6/11/25 showed increased mild bibasilar atelectasis, and slightly increased small bilateral pleural effusions, CXR 6/14/25 showed increased bibasilar interstitial and patchy opacities, and patient was successfully weaned off supplemental oxygen and tolerated room air for several days prior to departure. PermaCath placement 6/16/25, and patient received several sessions of dialysis with improved kidney function. PT/OT recommended moderate intensity therapy and SNF, discharged to [facility] due to in-house dialysis availability.        -Fall prevention    -Cognitive engagement     -Monitor and treat blood pressure     -Aggressive decubitus ulcer prevention.     -Bowel and bladder care     -Optimal nutrition and supplementation as needed     -GI  and DVT prophylaxis     -Symptom control     -Ambulation as tolerated     -Will follow    Charting is done using voice recognition software and may contain errors which have not been completely corrected        Source of history: Nurse, Medical personnel, Medical record, Patient.  History limitation: None.    HPI:      Patient is unable to give any detailed history and therefore history is obtained from the chart  No acute complaints voiced by the patient or acute concerns raised by nursing    History of hospitalization-    History of Present Illness     A 75 year old male with past medical history of A-fib, CAD, HLD, HTN,  Dementia and Lewy body disease, presented to the ER with fever and generalized weakness. This was associated with chills, rigors, shortness of breath, cough, and confusion. On arrival to the hospital, patient was found to be febrile with temperature of 39.5 C, tachycardic with heart rate of 106, hypotensive with BP of 93/51, and tachypneic with respiratory rate of 28. Patient was placed on 2L/min O2 per NC. In the ER, blood work showed hypokalemia with potassium level of 3.1, hypomagnesemia with magnesium level of 1.3, elevated troponin, leukopenia with WBCs of 2.3, and nasopharyngeal swabs were negative for COVID and flu. Urinalysis showed pyuria, and urine culture showed gram negative rods. CXR 6/8/2025 showed left lower lobe infiltrate vs pneumonia, CT lung/mediastinum 6/8/25 showed small bilateral pleural effusions, and right solid pulmonary nodule within the upper lobe measuring 8 mm. CT brain 6/8/25 was negative for acute intracranial abnormality and showed mild generalized cerebral atrophy secondary to chronic ischemic small vessel disease. CT abdomen/pelvis 6/8/25 showed no acute process and was unremarkable. Patient was admitted as a case of severe sepsis with septic shock, acute UTI, suspected left lower lobe pneumonia, acute metabolic encephalopathy, and demand myocardial ischemia. Patient was treated with IV Rocephin, IV Azithromycin, IV fluids; PT/OT, ID, and cardiology on consult.       Histories   Past Medical History:   A-fib  CAD  HLD  HTN  Lewy body disease    Problem List/Past Medical History Ongoing Afib CAD (coronary artery disease) HLD (hyperlipidemia) HTN (hypertension) MI (myocardial infarction) Osteoarthritis of left hip Seizure disorder Procedure/Surgical History   Cardioversion. (03/21/2024)   Transesophageal Echocardiogram. (03/21/2024)   Dual Chamber Pacer & Lead Insertion. (03/18/2024)   LEFT TOTAL HIP (03/13/2024)   Cardiac ablation   Left knee arthroscopy   Right total hip  replacement    Allergies No Known Allergies No Known Medication Allergies Social History   Alcohol - Denies Alcohol Use   Home/Environment Lives with:Spouse *Living Situation Prior to AdmissionHome with home health Home equipment:Walker/Cane *Special Services and Community Resources Prior to AdmissionNone *Mobility Assistance Prior to AdmissionIndependent *Will patient require additional/new services upon discharge?Yes   Substance Abuse - Denies Substance Abuse   Tobacco - Denies Tobacco Use Use:Former smoker, quit more than 30 days ago Type:Cigarettes Family History Asphyxiation: Father. Pneumonia: Mother.  Current Medications[1]    Physical Exam:  Vital signs as per nursing/MA documentation were reviewed  General appearance: Alert and in no acute distress  HEENT: Normal Inspection  Neck - Normal Inspection  Respiratory : No respiratory distress. Lungs are clear   Cardiovascular: heart rate normal. No gallop  Back - normal inspection  Skin inspection:Warm  Musculoskeletal : No deformities  Neuro : Limited exam. Baseline    ROS: Review of symptoms is negative except for what is mentioned in HPI    Results/Data  Records including but not limited to electronic medical records, relevant lab work and imaging from patient's health care facility encounter were reviewed and independently verified      Charting was completed using voice recognition technology and may include unintended errors.    Discussed with patient/family, RN, and NP.         [1]   Current Outpatient Medications   Medication Sig Dispense Refill    ALPRAZolam (Xanax) 0.25 mg tablet Take 1 tablet (0.25 mg) by mouth once daily in the morning.      aspirin 81 mg chewable tablet Chew 1 tablet (81 mg) once daily.      atorvastatin (Lipitor) 40 mg tablet Take 1 tablet (40 mg) by mouth once daily at bedtime.      b complex vitamins capsule Take 1 capsule by mouth once daily.      cholecalciferol (D3-2000) 50 mcg (2,000 units) capsule Take 1 capsule (50 mcg) by  mouth once daily.      donepezil (Aricept) 10 mg tablet Please take 10 mg/day (Patient taking differently: Take 1 tablet (10 mg) by mouth once daily in the morning.) 30 tablet 5    meclizine (Antivert) 25 mg tablet Take 1 tablet (25 mg) by mouth 3 times a day as needed (dizziness).      meclizine (Antivert) 25 mg tablet Take 1 tablet (25 mg) by mouth 4 times a day.      OXcarbazepine (Trileptal) 600 mg tablet Take 2 tablets (1,200 mg) by mouth once daily in the morning.      OXcarbazepine (Trileptal) 600 mg tablet Take 1.5 tablets (900 mg) by mouth once daily at bedtime.      PHENobarbital (Luminal) 64.8 mg tablet Take 1 tablet (64.8 mg) by mouth 2 times a day. 180 tablet 2    phenytoin ER (Dilantin) 100 mg capsule TAKE TWO CAPSULES BY MOUTH TWO TIMES A  capsule 0    propranolol LA (Inderal LA) 60 mg 24 hr capsule Take 1 capsule (60 mg) by mouth once daily. Do not crush, chew, or split.      rivaroxaban (Xarelto) 20 mg tablet Take 1 tablet (20 mg) by mouth once daily in the evening. Take with meals.      scopolamine (Transderm-Scop) 1 mg over 3 days patch 3 day Place 1 patch over 72 hours on the skin every 3rd day.      sertraline (Zoloft) 50 mg tablet Take 1 tablet (50 mg) by mouth 2 times a day.       No current facility-administered medications for this visit.

## 2025-07-25 ENCOUNTER — PATIENT MESSAGE (OUTPATIENT)
Dept: NEUROLOGY | Facility: CLINIC | Age: 75
End: 2025-07-25
Payer: MEDICARE

## 2025-08-01 ENCOUNTER — APPOINTMENT (OUTPATIENT)
Dept: NEUROLOGY | Facility: CLINIC | Age: 75
End: 2025-08-01
Payer: MEDICARE

## 2025-08-08 DIAGNOSIS — R51.9 FACIAL PAIN: ICD-10-CM

## 2025-08-08 DIAGNOSIS — R26.9 GAIT DISORDER: ICD-10-CM

## 2025-08-08 DIAGNOSIS — R25.1 TREMORS OF NERVOUS SYSTEM: ICD-10-CM

## 2025-08-08 DIAGNOSIS — Z87.898 HISTORY OF SEIZURE: ICD-10-CM

## 2025-08-08 DIAGNOSIS — R41.89 COGNITIVE DECLINE: ICD-10-CM

## 2025-08-08 RX ORDER — DONEPEZIL HYDROCHLORIDE 10 MG/1
TABLET, FILM COATED ORAL
Qty: 90 TABLET | Refills: 1 | Status: SHIPPED | OUTPATIENT
Start: 2025-08-08

## 2025-08-08 NOTE — PROGRESS NOTES
I refilled the Donepezil.  I would have to examine him for his tremor - I believe he has a follow up appointment with me coming up.

## 2025-08-08 NOTE — PROGRESS NOTES
Patient called requesting a refill on donepezil 10 mg. sent to his verified pharmacy, [facility] in [place].   He said if you are keeping him on the medication he wants to know if he could have a 90 day supply with refills?     He also wanted me to let you know that both his hands are still shaking.  He wanted to know if there was anything to prescribe for the shaking?    Pt presents with c/o N/V/D that began this morning.

## 2025-09-05 ENCOUNTER — APPOINTMENT (OUTPATIENT)
Dept: NEUROLOGY | Facility: CLINIC | Age: 75
End: 2025-09-05
Payer: MEDICARE